# Patient Record
Sex: MALE | Race: OTHER | HISPANIC OR LATINO | Employment: FULL TIME | URBAN - METROPOLITAN AREA
[De-identification: names, ages, dates, MRNs, and addresses within clinical notes are randomized per-mention and may not be internally consistent; named-entity substitution may affect disease eponyms.]

---

## 2021-10-31 ENCOUNTER — HOSPITAL ENCOUNTER (EMERGENCY)
Facility: HOSPITAL | Age: 61
Discharge: HOME | End: 2021-10-31
Attending: EMERGENCY MEDICINE
Payer: COMMERCIAL

## 2021-10-31 ENCOUNTER — APPOINTMENT (EMERGENCY)
Dept: RADIOLOGY | Facility: HOSPITAL | Age: 61
End: 2021-10-31
Payer: COMMERCIAL

## 2021-10-31 VITALS
BODY MASS INDEX: 29.33 KG/M2 | DIASTOLIC BLOOD PRESSURE: 64 MMHG | HEIGHT: 69 IN | HEART RATE: 82 BPM | OXYGEN SATURATION: 98 % | SYSTOLIC BLOOD PRESSURE: 137 MMHG | TEMPERATURE: 99.3 F | WEIGHT: 198 LBS | RESPIRATION RATE: 16 BRPM

## 2021-10-31 DIAGNOSIS — M25.561 ACUTE PAIN OF RIGHT KNEE: Primary | ICD-10-CM

## 2021-10-31 PROCEDURE — 99284 EMERGENCY DEPT VISIT MOD MDM: CPT | Performed by: EMERGENCY MEDICINE

## 2021-10-31 PROCEDURE — 99284 EMERGENCY DEPT VISIT MOD MDM: CPT

## 2021-10-31 PROCEDURE — 73564 X-RAY EXAM KNEE 4 OR MORE: CPT

## 2021-10-31 RX ORDER — IBUPROFEN 600 MG/1
600 TABLET ORAL ONCE
Status: COMPLETED | OUTPATIENT
Start: 2021-10-31 | End: 2021-10-31

## 2021-10-31 RX ADMIN — IBUPROFEN 600 MG: 600 TABLET ORAL at 21:15

## 2021-11-01 ENCOUNTER — HOSPITAL ENCOUNTER (OUTPATIENT)
Dept: VASCULAR ULTRASOUND | Facility: HOSPITAL | Age: 61
Discharge: HOME | End: 2021-11-01
Payer: COMMERCIAL

## 2021-11-01 DIAGNOSIS — M25.561 ACUTE PAIN OF RIGHT KNEE: ICD-10-CM

## 2021-11-01 PROCEDURE — 93971 EXTREMITY STUDY: CPT

## 2021-11-01 PROCEDURE — 93971 EXTREMITY STUDY: CPT | Performed by: SURGERY

## 2021-11-04 VITALS
DIASTOLIC BLOOD PRESSURE: 84 MMHG | SYSTOLIC BLOOD PRESSURE: 170 MMHG | BODY MASS INDEX: 28.88 KG/M2 | HEART RATE: 78 BPM | WEIGHT: 195 LBS | HEIGHT: 69 IN

## 2021-11-04 DIAGNOSIS — M25.461 EFFUSION OF RIGHT KNEE: Primary | ICD-10-CM

## 2021-11-04 LAB
CRYSTALS SNV QL MICRO: NORMAL
HISTIOCYTES NFR SNV MANUAL: 2 %
LYMPHOCYTES # SNV MANUAL: 11 %
MONOCYTES NFR SNV MANUAL: 3 %
NEUTROPHILS NFR SNV MANUAL: 84 %
SITE: ABNORMAL
TOTAL CELLS COUNTED SPEC: 100
WBC # FLD MANUAL: ABNORMAL /UL (ref 0–200)

## 2021-11-04 PROCEDURE — 20610 DRAIN/INJ JOINT/BURSA W/O US: CPT | Performed by: ORTHOPAEDIC SURGERY

## 2021-11-04 PROCEDURE — 87205 SMEAR GRAM STAIN: CPT | Performed by: PHYSICIAN ASSISTANT

## 2021-11-04 PROCEDURE — 89060 EXAM SYNOVIAL FLUID CRYSTALS: CPT | Performed by: PHYSICIAN ASSISTANT

## 2021-11-04 PROCEDURE — 87070 CULTURE OTHR SPECIMN AEROBIC: CPT | Performed by: PHYSICIAN ASSISTANT

## 2021-11-04 PROCEDURE — 87476 LYME DIS DNA AMP PROBE: CPT | Performed by: PHYSICIAN ASSISTANT

## 2021-11-04 PROCEDURE — 36415 COLL VENOUS BLD VENIPUNCTURE: CPT | Performed by: ORTHOPAEDIC SURGERY

## 2021-11-04 PROCEDURE — 89051 BODY FLUID CELL COUNT: CPT | Performed by: PHYSICIAN ASSISTANT

## 2021-11-04 PROCEDURE — 99204 OFFICE O/P NEW MOD 45 MIN: CPT | Performed by: ORTHOPAEDIC SURGERY

## 2021-11-04 RX ORDER — ACETAMINOPHEN 500 MG
500 TABLET ORAL EVERY 6 HOURS PRN
COMMUNITY
End: 2021-11-17

## 2021-11-07 LAB
BACTERIA SPEC BFLD CULT: NO GROWTH
GRAM STN SPEC: NORMAL
GRAM STN SPEC: NORMAL

## 2021-11-13 LAB — B BURGDOR DNA SPEC QL NAA+PROBE: POSITIVE

## 2021-11-17 ENCOUNTER — APPOINTMENT (EMERGENCY)
Dept: RADIOLOGY | Facility: HOSPITAL | Age: 61
End: 2021-11-17
Payer: COMMERCIAL

## 2021-11-17 ENCOUNTER — HOSPITAL ENCOUNTER (EMERGENCY)
Facility: HOSPITAL | Age: 61
Discharge: HOME | End: 2021-11-17
Attending: INTERNAL MEDICINE | Admitting: INTERNAL MEDICINE
Payer: COMMERCIAL

## 2021-11-17 VITALS
WEIGHT: 195 LBS | HEIGHT: 69 IN | DIASTOLIC BLOOD PRESSURE: 76 MMHG | RESPIRATION RATE: 17 BRPM | BODY MASS INDEX: 28.88 KG/M2 | HEART RATE: 72 BPM | TEMPERATURE: 98 F | OXYGEN SATURATION: 100 % | SYSTOLIC BLOOD PRESSURE: 145 MMHG

## 2021-11-17 DIAGNOSIS — A69.23 LYME ARTHRITIS (HCC): Primary | ICD-10-CM

## 2021-11-17 PROCEDURE — 73562 X-RAY EXAM OF KNEE 3: CPT

## 2021-11-17 PROCEDURE — 99283 EMERGENCY DEPT VISIT LOW MDM: CPT

## 2021-11-17 PROCEDURE — 99284 EMERGENCY DEPT VISIT MOD MDM: CPT | Performed by: INTERNAL MEDICINE

## 2021-11-17 RX ORDER — DOXYCYCLINE HYCLATE 100 MG/1
100 CAPSULE ORAL EVERY 12 HOURS SCHEDULED
Qty: 56 CAPSULE | Refills: 0 | Status: SHIPPED | OUTPATIENT
Start: 2021-11-17 | End: 2021-11-17 | Stop reason: SDUPTHER

## 2021-11-17 RX ORDER — DOXYCYCLINE HYCLATE 100 MG/1
100 CAPSULE ORAL EVERY 12 HOURS SCHEDULED
Qty: 56 CAPSULE | Refills: 0 | Status: SHIPPED | OUTPATIENT
Start: 2021-11-17 | End: 2021-12-15

## 2021-11-17 RX ORDER — DOXYCYCLINE HYCLATE 100 MG/1
100 CAPSULE ORAL ONCE
Status: COMPLETED | OUTPATIENT
Start: 2021-11-17 | End: 2021-11-17

## 2021-11-17 RX ORDER — DOXYCYCLINE HYCLATE 100 MG/1
100 TABLET, DELAYED RELEASE ORAL 2 TIMES DAILY
Qty: 56 TABLET | Refills: 0 | Status: SHIPPED | OUTPATIENT
Start: 2021-11-17 | End: 2021-11-17

## 2021-11-17 RX ADMIN — DOXYCYCLINE 100 MG: 100 CAPSULE ORAL at 15:11

## 2021-12-05 ENCOUNTER — DOCUMENTATION (OUTPATIENT)
Dept: OTHER | Facility: HOSPITAL | Age: 61
End: 2021-12-05

## 2021-12-06 ENCOUNTER — CONSULT (OUTPATIENT)
Dept: INFECTIOUS DISEASES | Facility: CLINIC | Age: 61
End: 2021-12-06
Payer: COMMERCIAL

## 2021-12-06 VITALS
SYSTOLIC BLOOD PRESSURE: 142 MMHG | RESPIRATION RATE: 18 BRPM | TEMPERATURE: 98 F | HEART RATE: 68 BPM | DIASTOLIC BLOOD PRESSURE: 74 MMHG | WEIGHT: 195 LBS | BODY MASS INDEX: 28.88 KG/M2 | HEIGHT: 69 IN

## 2021-12-06 DIAGNOSIS — A69.23 LYME ARTHRITIS OF KNEE (HCC): Primary | ICD-10-CM

## 2021-12-06 PROCEDURE — 99204 OFFICE O/P NEW MOD 45 MIN: CPT | Performed by: INTERNAL MEDICINE

## 2021-12-06 RX ORDER — DOXYCYCLINE HYCLATE 100 MG/1
100 CAPSULE ORAL EVERY 12 HOURS SCHEDULED
Qty: 60 CAPSULE | Refills: 0 | Status: SHIPPED | OUTPATIENT
Start: 2021-12-06 | End: 2022-01-05

## 2021-12-09 ENCOUNTER — APPOINTMENT (OUTPATIENT)
Dept: LAB | Facility: HOSPITAL | Age: 61
End: 2021-12-09
Attending: INTERNAL MEDICINE
Payer: COMMERCIAL

## 2021-12-09 LAB
BASOPHILS # BLD AUTO: 0.03 THOUSANDS/ΜL (ref 0–0.1)
BASOPHILS NFR BLD AUTO: 1 % (ref 0–1)
CREAT SERPL-MCNC: 0.87 MG/DL (ref 0.5–1.2)
EOSINOPHIL # BLD AUTO: 0.18 THOUSAND/ΜL (ref 0–0.61)
EOSINOPHIL NFR BLD AUTO: 4 % (ref 0–6)
ERYTHROCYTE [DISTWIDTH] IN BLOOD BY AUTOMATED COUNT: 14.2 % (ref 11.6–15.1)
GFR SERPL CREATININE-BSD FRML MDRD: 94 ML/MIN/1.73SQ M
HCT VFR BLD AUTO: 43.1 % (ref 36.5–49.3)
HGB BLD-MCNC: 14.2 G/DL (ref 12–17)
IMM GRANULOCYTES # BLD AUTO: 0.01 THOUSAND/UL (ref 0–0.2)
IMM GRANULOCYTES NFR BLD AUTO: 0 % (ref 0–2)
LYMPHOCYTES # BLD AUTO: 1.76 THOUSANDS/ΜL (ref 0.6–4.47)
LYMPHOCYTES NFR BLD AUTO: 34 % (ref 14–44)
MCH RBC QN AUTO: 27.5 PG (ref 26.8–34.3)
MCHC RBC AUTO-ENTMCNC: 32.9 G/DL (ref 31.4–37.4)
MCV RBC AUTO: 84 FL (ref 82–98)
MONOCYTES # BLD AUTO: 0.44 THOUSAND/ΜL (ref 0.17–1.22)
MONOCYTES NFR BLD AUTO: 8 % (ref 4–12)
NEUTROPHILS # BLD AUTO: 2.79 THOUSANDS/ΜL (ref 1.85–7.62)
NEUTS SEG NFR BLD AUTO: 53 % (ref 43–75)
PLATELET # BLD AUTO: 212 THOUSANDS/UL (ref 149–390)
PMV BLD AUTO: 12.3 FL (ref 8.9–12.7)
RBC # BLD AUTO: 5.16 MILLION/UL (ref 3.88–5.62)
WBC # BLD AUTO: 5.21 THOUSAND/UL (ref 4.31–10.16)

## 2021-12-09 PROCEDURE — 82565 ASSAY OF CREATININE: CPT | Performed by: INTERNAL MEDICINE

## 2021-12-09 PROCEDURE — 36415 COLL VENOUS BLD VENIPUNCTURE: CPT | Performed by: INTERNAL MEDICINE

## 2021-12-09 PROCEDURE — 85025 COMPLETE CBC W/AUTO DIFF WBC: CPT | Performed by: INTERNAL MEDICINE

## 2021-12-23 ENCOUNTER — APPOINTMENT (OUTPATIENT)
Dept: LAB | Facility: HOSPITAL | Age: 61
End: 2021-12-23
Attending: INTERNAL MEDICINE
Payer: COMMERCIAL

## 2021-12-29 ENCOUNTER — OFFICE VISIT (OUTPATIENT)
Dept: INFECTIOUS DISEASES | Facility: CLINIC | Age: 61
End: 2021-12-29
Payer: COMMERCIAL

## 2021-12-29 VITALS
HEIGHT: 69 IN | DIASTOLIC BLOOD PRESSURE: 70 MMHG | SYSTOLIC BLOOD PRESSURE: 130 MMHG | BODY MASS INDEX: 28.88 KG/M2 | OXYGEN SATURATION: 98 % | HEART RATE: 74 BPM | WEIGHT: 195 LBS

## 2021-12-29 DIAGNOSIS — A69.23 LYME ARTHRITIS OF KNEE (HCC): Primary | ICD-10-CM

## 2021-12-29 PROCEDURE — 99214 OFFICE O/P EST MOD 30 MIN: CPT | Performed by: INTERNAL MEDICINE

## 2021-12-30 VITALS
WEIGHT: 191 LBS | BODY MASS INDEX: 28.29 KG/M2 | SYSTOLIC BLOOD PRESSURE: 136 MMHG | DIASTOLIC BLOOD PRESSURE: 88 MMHG | HEIGHT: 69 IN

## 2021-12-30 DIAGNOSIS — A69.23 LYME ARTHRITIS OF KNEE (HCC): Primary | ICD-10-CM

## 2021-12-30 PROCEDURE — 99213 OFFICE O/P EST LOW 20 MIN: CPT | Performed by: ORTHOPAEDIC SURGERY

## 2021-12-30 PROCEDURE — 3008F BODY MASS INDEX DOCD: CPT | Performed by: ORTHOPAEDIC SURGERY

## 2022-01-03 ENCOUNTER — NURSE TRIAGE (OUTPATIENT)
Dept: OTHER | Facility: OTHER | Age: 62
End: 2022-01-03

## 2022-01-03 DIAGNOSIS — Z20.828 SARS-ASSOCIATED CORONAVIRUS EXPOSURE: Primary | ICD-10-CM

## 2022-01-03 NOTE — TELEPHONE ENCOUNTER
Reason for Disposition   [1] COVID-19 infection suspected by caller or triager AND [2] mild symptoms (cough, fever, or others) AND [3] has not gotten tested yet    Answer Assessment - Initial Assessment Questions  1  COVID-19 DIAGNOSIS: "Who made your COVID-19 diagnosis?" "Was it confirmed by a positive lab test?" If not diagnosed by a HCP, ask "Are there lots of cases (community spread) where you live?" Note: See [facility] health department website, if unsure  n/a  2  COVID-19 EXPOSURE: "Was there any known exposure to COVID before the symptoms began?" CDC Definition of close contact: within 6 feet (2 meters) for a total of 15 minutes or more over a 24-hour period  Yes in home  3  ONSET: "When did the COVID-19 symptoms start?"       Yesterday  4  WORST SYMPTOM: "What is your worst symptom?" (e g , cough, fever, shortness of breath, muscle aches)      Cough  5  COUGH: "Do you have a cough?" If Yes, ask: "How bad is the cough?"        moderate  6  FEVER: "Do you have a fever?" If Yes, ask: "What is your temperature, how was it measured, and when did it start?"      no  7  RESPIRATORY STATUS: "Describe your breathing?" (e g , shortness of breath, wheezing, unable to speak)       normal  8  BETTER-SAME-WORSE: "Are you getting better, staying the same or getting worse compared to yesterday?"  If getting worse, ask, "In what way?"      Worse   9  HIGH RISK DISEASE: "Do you have any chronic medical problems?" (e g , asthma, heart or lung disease, weak immune system, obesity, etc )      No   10  VACCINE: "Have you gotten the COVID-19 vaccine?" If Yes ask: "Which one, how many shots, when did you get it?"        2 shots  11  PREGNANCY: "Is there any chance you are pregnant?" "When was your last menstrual period?"        no  12   OTHER SYMPTOMS: "Do you have any other symptoms?"  (e g , chills, fatigue, headache, loss of smell or taste, muscle pain, sore throat; new loss of smell or taste especially support the diagnosis of COVID-19)        No    Protocols used: CORONAVIRUS (COVID-19) DIAGNOSED OR SUSPECTED-ADULT-AH

## 2022-01-03 NOTE — TELEPHONE ENCOUNTER
Regarding: Symptomatic COVID cough  ----- Message from Amaury Kate sent at 1/3/2022  6:11 PM EST -----  "here in my house i have 1-2 people that had covid  i need to do the test to return to work   I only have a little cough"

## 2022-01-04 PROCEDURE — 87636 SARSCOV2 & INF A&B AMP PRB: CPT | Performed by: FAMILY MEDICINE

## 2022-02-07 ENCOUNTER — OFFICE VISIT (OUTPATIENT)
Dept: FAMILY MEDICINE CLINIC | Facility: CLINIC | Age: 62
End: 2022-02-07
Payer: COMMERCIAL

## 2022-02-07 VITALS
BODY MASS INDEX: 29.33 KG/M2 | RESPIRATION RATE: 16 BRPM | DIASTOLIC BLOOD PRESSURE: 80 MMHG | OXYGEN SATURATION: 99 % | WEIGHT: 198 LBS | HEART RATE: 80 BPM | HEIGHT: 69 IN | SYSTOLIC BLOOD PRESSURE: 142 MMHG

## 2022-02-07 DIAGNOSIS — E55.9 VITAMIN D DEFICIENCY: ICD-10-CM

## 2022-02-07 DIAGNOSIS — M25.461 EFFUSION OF RIGHT KNEE: ICD-10-CM

## 2022-02-07 DIAGNOSIS — Z11.59 ENCOUNTER FOR HEPATITIS C SCREENING TEST FOR LOW RISK PATIENT: ICD-10-CM

## 2022-02-07 DIAGNOSIS — E53.8 B12 DEFICIENCY: ICD-10-CM

## 2022-02-07 DIAGNOSIS — Z00.00 WELL ADULT EXAM: ICD-10-CM

## 2022-02-07 DIAGNOSIS — Z12.5 SCREENING FOR PROSTATE CANCER: ICD-10-CM

## 2022-02-07 DIAGNOSIS — Z12.11 SCREENING FOR MALIGNANT NEOPLASM OF COLON: ICD-10-CM

## 2022-02-07 DIAGNOSIS — Z11.4 SCREENING FOR HIV (HUMAN IMMUNODEFICIENCY VIRUS): ICD-10-CM

## 2022-02-07 DIAGNOSIS — Z82.0 FAMILY HISTORY OF ALZHEIMER'S DISEASE: ICD-10-CM

## 2022-02-07 DIAGNOSIS — M25.561 ACUTE PAIN OF RIGHT KNEE: Primary | ICD-10-CM

## 2022-02-07 PROCEDURE — 99203 OFFICE O/P NEW LOW 30 MIN: CPT | Performed by: FAMILY MEDICINE

## 2022-02-07 PROCEDURE — 3725F SCREEN DEPRESSION PERFORMED: CPT | Performed by: FAMILY MEDICINE

## 2022-02-07 PROCEDURE — 1036F TOBACCO NON-USER: CPT | Performed by: FAMILY MEDICINE

## 2022-02-07 PROCEDURE — 3008F BODY MASS INDEX DOCD: CPT | Performed by: FAMILY MEDICINE

## 2022-02-07 PROCEDURE — 99386 PREV VISIT NEW AGE 40-64: CPT | Performed by: FAMILY MEDICINE

## 2022-02-07 NOTE — PROGRESS NOTES
Assessment/Plan:    After the lyme arthrtis tx   He was not in pain anymore but he found his knee is weak and jac   We can send him for BW   Colon CA and Prostate CA screening also   For the fhx of alzheimer - try luminosity waylon and ex and breath daily     1  Acute pain of right knee  -     Ambulatory Referral to Physical Therapy; Future    2  Well adult exam  -     CBC and differential; Future  -     Comprehensive metabolic panel; Future  -     Lipid Panel with Direct LDL reflex; Future    3  Screening for prostate cancer  -     PSA, Total Screen; Future    4  Screening for malignant neoplasm of colon  -     Cologuard    5  Encounter for hepatitis C screening test for low risk patient  -     Hepatitis C antibody; Future    6  Screening for HIV (human immunodeficiency virus)  -     HIV 1/2 Antigen/Antibody (4th Generation) w Reflex [facility]; Future    7  B12 deficiency  -     Vitamin B12; Future    8  Vitamin D deficiency  -     Vitamin D 25 hydroxy; Future    9  BMI 29 0-29 9,adult    10  Family history of Alzheimer's disease         Subjective:      Patient ID: Stacie Sheth is a 62 y o  male  HPI  Here with wife for follow up on knee pain   And to establish care  fhx of Alzheimer     The following portions of the patient's history were reviewed and updated as appropriate: allergies, current medications, past family history, past medical history, past social history, past surgical history and problem list     Review of Systems   Constitutional: Negative for activity change, appetite change and fever  HENT: Negative for congestion, nosebleeds and trouble swallowing  Eyes: Negative for itching  Respiratory: Negative for cough and chest tightness  Cardiovascular: Negative for chest pain and palpitations  Gastrointestinal: Negative for abdominal pain, constipation, diarrhea and nausea  Endocrine: Negative for cold intolerance  Genitourinary: Negative for frequency     Musculoskeletal: Positive for arthralgias and gait problem  Negative for joint swelling  Skin: Negative for rash  Allergic/Immunologic: Negative for immunocompromised state  Neurological: Negative for dizziness, tremors, seizures, syncope and headaches  Psychiatric/Behavioral: Negative for hallucinations and suicidal ideas  Objective:      /80 (BP Location: Right arm, Patient Position: Sitting, Cuff Size: Standard)   Pulse 80   Resp 16   Ht 5' 9" (1 753 m)   Wt 89 8 kg (198 lb)   SpO2 99%   BMI 29 24 kg/m²     No visits with results within 2 Week(s) from this visit  Latest known visit with results is:   Orders Only on 01/04/2022   Component Date Value    SARS-CoV-2 01/04/2022 Positive*    INFLUENZA A PCR 01/04/2022 Negative     INFLUENZA B PCR 01/04/2022 Negative           Physical Exam  Vitals and nursing note reviewed  Constitutional:       General: He is not in acute distress  Appearance: He is well-developed  HENT:      Head: Normocephalic and atraumatic  Cardiovascular:      Rate and Rhythm: Normal rate and regular rhythm  Heart sounds: Normal heart sounds  No murmur heard  Pulmonary:      Effort: Pulmonary effort is normal       Breath sounds: Normal breath sounds  No wheezing or rales  Abdominal:      General: Bowel sounds are normal  There is no distension  Palpations: Abdomen is soft  Tenderness: There is no abdominal tenderness  There is no guarding or rebound  Musculoskeletal:         General: No tenderness  Normal range of motion  Cervical back: Normal range of motion and neck supple  Lymphadenopathy:      Cervical: No cervical adenopathy  Skin:     General: Skin is warm and dry  Capillary Refill: Capillary refill takes less than 2 seconds  Findings: No rash  Neurological:      Mental Status: He is alert and oriented to person, place, and time  Cranial Nerves: No cranial nerve deficit  Sensory: No sensory deficit        Motor: No abnormal muscle tone  Psychiatric:         Behavior: Behavior normal          Thought Content: Thought content normal          Judgment: Judgment normal          BMI Counseling: Body mass index is 29 24 kg/m²  The BMI is above normal  Nutrition recommendations include decreasing portion sizes, encouraging healthy choices of fruits and vegetables and limiting drinks that contain sugar  Exercise recommendations include moderate physical activity 150 minutes/week  No pharmacotherapy was ordered  Rationale for BMI follow-up plan is due to patient being overweight or obese  Depression Screening and Follow-up Plan: Patient was screened for depression during today's encounter   They screened negative with a PHQ-2 score of 0       Corinne Snow MD  [place] [address]

## 2022-02-15 ENCOUNTER — EVALUATION (OUTPATIENT)
Dept: PHYSICAL THERAPY | Facility: CLINIC | Age: 62
End: 2022-02-15
Payer: COMMERCIAL

## 2022-02-15 DIAGNOSIS — M25.561 ACUTE PAIN OF RIGHT KNEE: ICD-10-CM

## 2022-02-15 PROCEDURE — 97161 PT EVAL LOW COMPLEX 20 MIN: CPT | Performed by: PHYSICAL THERAPIST

## 2022-02-15 NOTE — PROGRESS NOTES
PT Evaluation     Today's date: 2/15/2022  Patient name: Vadnana Torres  : 1960  MRN: [id number]  Referring provider: Armnado Van MD  Dx:   Encounter Diagnosis     ICD-10-CM    1  Acute pain of right knee  M25 561 Ambulatory Referral to Physical Therapy                  Assessment  Assessment details: Vandana Torres is a 62 y o  male who presents to physical therapy with diagnosis of right knee pain   Ramon presents with pain, abnormal gait, and limitations in range of motion, strength, joint mobility, flexibility, and functional ability  The current limitations are affecting Ramon's ability to function at prior level  He will benefit from skilled physical therapy to address the current impairments and functional limitations to enable him to return to daily activities at maximal level   Thank you for the referral     Impairments: abnormal gait, abnormal or restricted ROM, activity intolerance, impaired balance, impaired physical strength, lacks appropriate home exercise program, weight-bearing intolerance and poor posture     Symptom irritability: lowUnderstanding of Dx/Px/POC: good   Prognosis: good    Goals  STG  Patient will decrease pain at worst to 3/10  Patient will improve SLB to 5 seconds bilaterally  Patient will be independent with basic HEP    LTG  Patient will decrease pain at worst to 0-1/10  Patient will improve LE strength 1/2 grade in all deficit planes  Patient will ascend/descend steps with normal pattern, pain free  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  Patient will be independent with comprehensive HEP    Plan  Patient would benefit from: skilled physical therapy  Planned modality interventions: cryotherapy and thermotherapy: hydrocollator packs  Planned therapy interventions: manual therapy, neuromuscular re-education, patient education, therapeutic activities, therapeutic exercise, therapeutic training and home exercise program  Frequency: 2x week  Duration in weeks: 6  Treatment plan discussed with: patient        Subjective Evaluation    History of Present Illness  Mechanism of injury:   Ramonita Weber is a 62 y o  male presenting to physical therapy on 02/15/22 with primary complaints of right knee pain  He mentions it started about 6 months ago  He mentions he was working one day and had some discomfort and swelling in his knee  He went to the doctor and they drained his knee and they diagnosed him with lyme disease  He mentions since then he has been having some trouble with the knee giving out and feeling weak  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  He states it feels like it is going to give out at least 3-4x per day  He has difficulty ascending / descending the stairs, nonreciprocal pattern    Pain  Current pain ratin  At best pain ratin  At worst pain ratin  Location: R Knee  Quality: dull ache  Relieving factors: rest  Aggravating factors: walking    Social Support  Steps to enter house: yes  Stairs in house: yes     Employment status: working  Treatments  No previous or current treatments  Patient Goals  Patient goals for therapy: decreased pain, independence with ADLs/IADLs, increased strength and return to sport/leisure activities  Patient goal: decrease pain with walking, go up/down steps better        Objective    Gait: decreased stance phase on RLE, decreased knee extension throughout gait on R  Palpation: no tenderness noted upon examination      Knee AROM:  Right: WFL  Left: WFL    LE Strength (L,R)  Hip Flexion: 5/5 , 4+/5  Hip Extension: 5/5 , 4+/5  Hip Abduction: 4+/5 , 4/5  Hip IR (90/90): 5/5 , 5/5  Hip ER (90/90): 5/5 , 4/5  Knee Extension: 5/5 , 3+/5  Knee Flexion: 4+/5 , 4/5  Ankle DF: 5/5 , 5/5    Quad Set: good, fair   SLR R: able to compete, but reports it is challenging     Flexibility: significant tightness R hamstring and hip flexor    5x sit to stand: 25 69 seconds, discomfort in R knee, "weakness" noted      Balance:  SLB R: 3 sec  SLB L: 3 sec            Diagnosis: right knee pain   Precautions: hx of lyme disease   Goals: improve LE strength, decrease pain with ambulation   Manual Therapy 2/15/22                                               Exercise Diary         Therapeutic Exercise        bike        Hamstring stretch supine with strap        Prone HF Stretch with strap                                        Neuromuscular Re-education        QS        QS + SLR         Bridges        Clamshells        Side Stepping        HR/TR        SLB                Therapeutic Activities        Pt Education

## 2022-02-19 ENCOUNTER — APPOINTMENT (OUTPATIENT)
Dept: LAB | Facility: HOSPITAL | Age: 62
End: 2022-02-19
Payer: COMMERCIAL

## 2022-02-19 DIAGNOSIS — Z11.4 SCREENING FOR HIV (HUMAN IMMUNODEFICIENCY VIRUS): ICD-10-CM

## 2022-02-19 DIAGNOSIS — E53.8 B12 DEFICIENCY: ICD-10-CM

## 2022-02-19 DIAGNOSIS — Z00.00 WELL ADULT EXAM: ICD-10-CM

## 2022-02-19 DIAGNOSIS — E55.9 VITAMIN D DEFICIENCY: ICD-10-CM

## 2022-02-19 DIAGNOSIS — Z11.59 ENCOUNTER FOR HEPATITIS C SCREENING TEST FOR LOW RISK PATIENT: ICD-10-CM

## 2022-02-19 DIAGNOSIS — Z12.5 SCREENING FOR PROSTATE CANCER: ICD-10-CM

## 2022-02-19 LAB
25(OH)D3 SERPL-MCNC: 9.9 NG/ML (ref 30–100)
ALBUMIN SERPL BCP-MCNC: 4.3 G/DL (ref 3.4–4.8)
ALP SERPL-CCNC: 78 U/L (ref 10–129)
ALT SERPL W P-5'-P-CCNC: 18 U/L (ref 5–63)
ANION GAP SERPL CALCULATED.3IONS-SCNC: 7 MMOL/L (ref 4–13)
AST SERPL W P-5'-P-CCNC: 13 U/L (ref 15–41)
BASOPHILS # BLD AUTO: 0.03 THOUSANDS/ΜL (ref 0–0.1)
BASOPHILS NFR BLD AUTO: 0 % (ref 0–1)
BILIRUB SERPL-MCNC: 0.98 MG/DL (ref 0.3–1.2)
BUN SERPL-MCNC: 19 MG/DL (ref 6–20)
CALCIUM SERPL-MCNC: 9.2 MG/DL (ref 8.4–10.2)
CHLORIDE SERPL-SCNC: 101 MMOL/L (ref 96–108)
CHOLEST SERPL-MCNC: 218 MG/DL
CO2 SERPL-SCNC: 29 MMOL/L (ref 22–33)
CREAT SERPL-MCNC: 0.74 MG/DL (ref 0.5–1.2)
EOSINOPHIL # BLD AUTO: 0.16 THOUSAND/ΜL (ref 0–0.61)
EOSINOPHIL NFR BLD AUTO: 2 % (ref 0–6)
ERYTHROCYTE [DISTWIDTH] IN BLOOD BY AUTOMATED COUNT: 14.1 % (ref 11.6–15.1)
GFR SERPL CREATININE-BSD FRML MDRD: 100 ML/MIN/1.73SQ M
GLUCOSE P FAST SERPL-MCNC: 105 MG/DL (ref 70–105)
HCT VFR BLD AUTO: 44 % (ref 36.5–49.3)
HCV AB SER QL: NORMAL
HDLC SERPL-MCNC: 40 MG/DL
HGB BLD-MCNC: 14.6 G/DL (ref 12–17)
IMM GRANULOCYTES # BLD AUTO: 0.02 THOUSAND/UL (ref 0–0.2)
IMM GRANULOCYTES NFR BLD AUTO: 0 % (ref 0–2)
LDLC SERPL CALC-MCNC: 150 MG/DL (ref 0–100)
LYMPHOCYTES # BLD AUTO: 1.94 THOUSANDS/ΜL (ref 0.6–4.47)
LYMPHOCYTES NFR BLD AUTO: 26 % (ref 14–44)
MCH RBC QN AUTO: 28 PG (ref 26.8–34.3)
MCHC RBC AUTO-ENTMCNC: 33.2 G/DL (ref 31.4–37.4)
MCV RBC AUTO: 84 FL (ref 82–98)
MONOCYTES # BLD AUTO: 0.58 THOUSAND/ΜL (ref 0.17–1.22)
MONOCYTES NFR BLD AUTO: 8 % (ref 4–12)
NEUTROPHILS # BLD AUTO: 4.69 THOUSANDS/ΜL (ref 1.85–7.62)
NEUTS SEG NFR BLD AUTO: 64 % (ref 43–75)
NRBC BLD AUTO-RTO: 0 /100 WBCS
PLATELET # BLD AUTO: 216 THOUSANDS/UL (ref 149–390)
PMV BLD AUTO: 12.2 FL (ref 8.9–12.7)
POTASSIUM SERPL-SCNC: 4 MMOL/L (ref 3.5–5)
PROT SERPL-MCNC: 8.1 G/DL (ref 6.4–8.3)
PSA SERPL-MCNC: 0.6 NG/ML (ref 0–4)
RBC # BLD AUTO: 5.22 MILLION/UL (ref 3.88–5.62)
SODIUM SERPL-SCNC: 137 MMOL/L (ref 133–145)
TRIGL SERPL-MCNC: 138 MG/DL
VIT B12 SERPL-MCNC: 191 PG/ML (ref 100–900)
WBC # BLD AUTO: 7.42 THOUSAND/UL (ref 4.31–10.16)

## 2022-02-19 PROCEDURE — 82306 VITAMIN D 25 HYDROXY: CPT

## 2022-02-19 PROCEDURE — 80053 COMPREHEN METABOLIC PANEL: CPT

## 2022-02-19 PROCEDURE — 80061 LIPID PANEL: CPT

## 2022-02-19 PROCEDURE — 85025 COMPLETE CBC W/AUTO DIFF WBC: CPT

## 2022-02-19 PROCEDURE — 87389 HIV-1 AG W/HIV-1&-2 AB AG IA: CPT

## 2022-02-19 PROCEDURE — G0103 PSA SCREENING: HCPCS

## 2022-02-19 PROCEDURE — 86803 HEPATITIS C AB TEST: CPT

## 2022-02-19 PROCEDURE — 82607 VITAMIN B-12: CPT

## 2022-02-19 PROCEDURE — 36415 COLL VENOUS BLD VENIPUNCTURE: CPT

## 2022-02-21 ENCOUNTER — OFFICE VISIT (OUTPATIENT)
Dept: PHYSICAL THERAPY | Facility: CLINIC | Age: 62
End: 2022-02-21
Payer: COMMERCIAL

## 2022-02-21 DIAGNOSIS — M25.561 ACUTE PAIN OF RIGHT KNEE: Primary | ICD-10-CM

## 2022-02-21 LAB — HIV 1+2 AB+HIV1 P24 AG SERPL QL IA: NORMAL

## 2022-02-21 PROCEDURE — 97112 NEUROMUSCULAR REEDUCATION: CPT

## 2022-02-21 PROCEDURE — 97110 THERAPEUTIC EXERCISES: CPT

## 2022-02-21 NOTE — PROGRESS NOTES
Daily Note     Today's date: 2022  Patient name: Javier Torres  : 1960  MRN: [id number]  Referring provider: Jeane Cruz MD  Dx:   Encounter Diagnosis     ICD-10-CM    1  Acute pain of right knee  M25 561                   Subjective: Patient denies pain at start of session  Objective: See treatment diary below      Assessment: Tolerated treatment well  Exhibited significant HS tightness, consider manuals at NV  Some discomfort lateral and superior to patella with close packed position  Required VC and demonstration for proper technique with clamshells as patient had tendency to compensate with trunk  Patient demonstrated fatigue post treatment and would benefit from continued PT      Plan: Continue per plan of care  Diagnosis: right knee pain   Precautions: hx of lyme disease   Goals: improve LE strength, decrease pain with ambulation   Manual Therapy 2/15/22 2/21/22      HSS  NV?                                       Exercise Diary         Therapeutic Exercise        bike  5'      Hamstring stretch supine with strap  3x30"      Prone HF Stretch with strap  3x30"                                      Neuromuscular Re-education        QS  5"x20 R      QS + SLR   2x10 R      Bridges  2x10       Clamshells  10"x10 B      Side Stepping  8x at table       HR/TR  20x       SLB  15" 4x R               Therapeutic Activities        Pt Education

## 2022-02-24 ENCOUNTER — OFFICE VISIT (OUTPATIENT)
Dept: PHYSICAL THERAPY | Facility: CLINIC | Age: 62
End: 2022-02-24
Payer: COMMERCIAL

## 2022-02-24 DIAGNOSIS — M25.561 ACUTE PAIN OF RIGHT KNEE: Primary | ICD-10-CM

## 2022-02-24 PROCEDURE — 97110 THERAPEUTIC EXERCISES: CPT | Performed by: PHYSICAL THERAPIST

## 2022-02-24 PROCEDURE — 97112 NEUROMUSCULAR REEDUCATION: CPT | Performed by: PHYSICAL THERAPIST
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
## 2022-03-08 ENCOUNTER — OFFICE VISIT (OUTPATIENT)
Dept: PHYSICAL THERAPY | Facility: CLINIC | Age: 62
End: 2022-03-08
Payer: COMMERCIAL

## 2022-03-08 DIAGNOSIS — M25.561 ACUTE PAIN OF RIGHT KNEE: Primary | ICD-10-CM

## 2022-03-08 PROCEDURE — 97112 NEUROMUSCULAR REEDUCATION: CPT

## 2022-03-08 PROCEDURE — 97140 MANUAL THERAPY 1/> REGIONS: CPT

## 2022-03-08 PROCEDURE — 97530 THERAPEUTIC ACTIVITIES: CPT

## 2022-03-08 PROCEDURE — 97110 THERAPEUTIC EXERCISES: CPT

## 2022-03-08 NOTE — PROGRESS NOTES
Daily Note     Today's date: 3/8/2022  Patient name: Pamela Ayala  : 1960  MRN: [id number]  Referring provider: Stephanie Hou MD  Dx:   Encounter Diagnosis     ICD-10-CM    1  Acute pain of right knee  M25 561                   Subjective: Pt reports that his knee is about the same, he gets random pains out of the blue  Objective: See treatment diary below      Assessment: Tolerated treatment well  Progressed with strengthening ex's as tolerated with pt having no reports of increased pain sx's  Pt challenged with strengthening ex's, especially hips strengthening and muscle fatigue is noted  Cues for form needed  Pt did have minimal discomfort with QS at 100%, but was able to perform at 75% with no pain  Discussed HEP with pt reporting understanding  Pt will benefit from continued therapy        Plan: Continue per plan of care     Diagnosis: right knee pain   Precautions: hx of lyme disease   Goals: improve LE strength, decrease pain with ambulation   Manual Therapy 2/15/22 2/21/22 2/24/22 3/8/22    HSS  NV? SK, PT R only     STM to medial knee    TJH, PTA                            Exercise Diary         Therapeutic Exercise        bike  5'  5 min     Hamstring stretch supine with strap  3x30" 3x30" Seated at EOT  3x30 sec    Prone HF Stretch with strap  3x30" 3x30"                                     Neuromuscular Re-education        QS  5"x20 R 5" x20 10x10 sec at 75%    QS + SLR   2x10 R 2x10 2x10 ea    Bridges  2x10  2x10 20x    Clamshells  10"x10 B 5"x10 ea 15x    Side Stepping  8x at table  RTB  GTB: 2x at mirror    HR/TR  20x  30x     TKE    GTB: 15x    Tandem balance on foam    3x30 sec ea    Mini squats    NV    Step ups    6" 15x on R    SLB  15" 4x R  20" x3 R 3x30 sec on R    Wobble board    2 mins ea    Therapeutic Activities        Pt Education    HEP discussed

## 2022-03-10 ENCOUNTER — OFFICE VISIT (OUTPATIENT)
Dept: PHYSICAL THERAPY | Facility: CLINIC | Age: 62
End: 2022-03-10
Payer: COMMERCIAL

## 2022-03-10 DIAGNOSIS — M25.561 ACUTE PAIN OF RIGHT KNEE: Primary | ICD-10-CM

## 2022-03-10 PROCEDURE — 97110 THERAPEUTIC EXERCISES: CPT

## 2022-03-10 PROCEDURE — 97140 MANUAL THERAPY 1/> REGIONS: CPT

## 2022-03-10 PROCEDURE — 97530 THERAPEUTIC ACTIVITIES: CPT

## 2022-03-10 PROCEDURE — 97112 NEUROMUSCULAR REEDUCATION: CPT

## 2022-03-10 NOTE — PROGRESS NOTES
Daily Note     Today's date: 3/10/2022  Patient name: Vilma Rocha  : 1960  MRN: [id number]  Referring provider: Grace Cartagena MD  Dx:   Encounter Diagnosis     ICD-10-CM    1  Acute pain of right knee  M25 561                   Subjective: Pt states that the back of his leg was sore after his last visit, but he thinks that his knee felt better  He doesn't remember that it gave out at all  Objective: See treatment diary below      Assessment: Tolerated treatment well  Continued with outlined program and progressed with strengthening as able with no c/o increased pain sx's  Pt challenged and muscle fatigue is noted  Cues needed for proper form of squat to avoid knee pain  Decreased pain noted after manual therapy  Pt will benefit from continued therapy         Plan: Continue per plan of care     Diagnosis: right knee pain   Precautions: hx of lyme disease   Goals: improve LE strength, decrease pain with ambulation   Manual Therapy  2/21/22 2/24/22 3/8/22 3/10/22   HSS  NV? SK, PT R only     STM to medial knee    TJH, PTA TJH, PTA   + patellar tendon                           Exercise Diary         Therapeutic Exercise        bike  5'  5 min  5 mins   Hamstring stretch supine with strap  3x30" 3x30" Seated at EOT  3x30 sec Seated at EOT  3x30 sec   Prone HF Stretch with strap  3x30" 3x30"                                     Neuromuscular Re-education        QS  5"x20 R 5" x20 10x10 sec at 75%    QS + SLR   2x10 R 2x10 2x10 ea 2x10 ea   Bridges  2x10  2x10 20x 20x   Clamshells  10"x10 B 5"x10 ea 15x 20x   Side Stepping  8x at table  RTB  GTB: 2x at mirror    HR/TR  20x  30x     TKE    GTB: 15x    Tandem balance on foam    3x30 sec ea    Mini squats    NV 15x   Step ups    6" 15x on R 6" 15x on R   SLB  15" 4x R  20" x3 R 3x30 sec on R On foam: 3x30 sec   Wobble board    2 mins ea 2 mins ea   Therapeutic Activities        Pt Education    HEP discussed

## 2022-03-15 ENCOUNTER — OFFICE VISIT (OUTPATIENT)
Dept: PHYSICAL THERAPY | Facility: CLINIC | Age: 62
End: 2022-03-15
Payer: COMMERCIAL

## 2022-03-15 DIAGNOSIS — M25.561 ACUTE PAIN OF RIGHT KNEE: Primary | ICD-10-CM

## 2022-03-15 PROCEDURE — 97110 THERAPEUTIC EXERCISES: CPT

## 2022-03-15 PROCEDURE — 97530 THERAPEUTIC ACTIVITIES: CPT

## 2022-03-15 PROCEDURE — 97112 NEUROMUSCULAR REEDUCATION: CPT

## 2022-03-15 PROCEDURE — 97140 MANUAL THERAPY 1/> REGIONS: CPT

## 2022-03-15 NOTE — PROGRESS NOTES
Daily Note     Today's date: 3/15/2022  Patient name: Jaida Martinez  : 1960  MRN: [id number]  Referring provider: Елена Galicia MD  Dx:   Encounter Diagnosis     ICD-10-CM    1  Acute pain of right knee  M25 561                   Subjective: Pt states that he continues to have pain in his knee, and it is more in the front at times  He reports that it is better than it was  Objective: See treatment diary below      Assessment: Tolerated treatment well  Continued with outlined program and progressed with strengthening as able  Pt was unable to perform step up and over due to increased knee pain  No complaints with step ups  Tenderness noted in medial knee/adductor with decreased pain and tightness noted after  Pt will benefit from continued therapy  Plan: Continue per plan of care     Diagnosis: right knee pain   Precautions: hx of lyme disease   Goals: improve LE strength, decrease pain with ambulation   Manual Therapy 3/15/22  2/24/22 3/8/22 3/10/22   HSS   SK, PT R only     STM to medial knee TJH, PTA   TJH, PTA TJH, PTA   + patellar tendon                           Exercise Diary         Therapeutic Exercise        bike 5 mins   5 min  5 mins   Hamstring stretch supine with strap NV  3x30" Seated at EOT  3x30 sec Seated at EOT  3x30 sec   Prone HF Stretch with strap   3x30"     Adductor stretch NV?                                Neuromuscular Re-education        QS   5" x20 10x10 sec at 75%    QS + SLR  2x10 ea  2x10 2x10 ea 2x10 ea   Bridges 20x  2x10 20x 20x   Clamshells   5"x10 ea 15x 20x   Side Stepping   RTB  GTB: 2x at mirror    HR/TR   30x     TKE    GTB: 15x    Tandem balance on foam 2x30 sec ea   3x30 sec ea    Mini squats    NV 15x   Step ups 6" 15x on R   6" 15x on R 6" 15x on R   SLB on foam 3x30 sec  20" x3 R 3x30 sec on R On foam: 3x30 sec   Wobble board 2 mins ea   2 mins ea 2 mins ea   Therapeutic Activities        Pt Education HEP discussed   HEP discussed

## 2022-03-17 ENCOUNTER — OFFICE VISIT (OUTPATIENT)
Dept: PHYSICAL THERAPY | Facility: CLINIC | Age: 62
End: 2022-03-17
Payer: COMMERCIAL

## 2022-03-17 DIAGNOSIS — M25.561 ACUTE PAIN OF RIGHT KNEE: Primary | ICD-10-CM

## 2022-03-17 PROCEDURE — 97110 THERAPEUTIC EXERCISES: CPT

## 2022-03-17 PROCEDURE — 97112 NEUROMUSCULAR REEDUCATION: CPT

## 2022-03-17 PROCEDURE — 97530 THERAPEUTIC ACTIVITIES: CPT

## 2022-03-17 PROCEDURE — 97140 MANUAL THERAPY 1/> REGIONS: CPT

## 2022-03-17 NOTE — PROGRESS NOTES
Daily Note     Today's date: 3/17/2022  Patient name: Stacie Sheth  : 1960  MRN: [id number]  Referring provider: Colten Rao MD  Dx:   Encounter Diagnosis     ICD-10-CM    1  Acute pain of right knee  M25 561                   Subjective: Pt states that his knee is feeling better today  He states that it was a little sore yesterday still  Objective: See treatment diary below      Assessment: Tolerated treatment well  Less tenderness is noted during manual therapy, although he did continue to have some in his distal adductor  Continued with hip and knee strengthening, with pt having no c/o increased sx's  Pt challenged and muscle fatigue is noted  Discussed pt's HEP with pt reporting understanding  Pt will benefit from continued therapy to improve his strength and decrease pain        Plan: Continue per plan of care     Diagnosis: right knee pain   Precautions: hx of lyme disease   Goals: improve LE strength, decrease pain with ambulation   Manual Therapy 3/15/22 3/17/22  3/8/22 3/10/22   HSS        STM to medial knee TJH, PTA TJH, PTA  TJH, PTA TJH, PTA   + patellar tendon                           Exercise Diary         Therapeutic Exercise        bike 5 mins 5 mins   5 mins   Hamstring stretch supine with strap NV   Seated at EOT  3x30 sec Seated at EOT  3x30 sec   Prone HF Stretch with strap        Adductor stretch NV? 3x30 sec                               Neuromuscular Re-education        QS    10x10 sec at 75%    QS + SLR  2x10 ea 2x10 ea  2x10 ea 2x10 ea   Bridges 20x   20x 20x   Clamshells  20x  15x 20x   Side Stepping    GTB: 2x at mirror    HR/TR        TKE    GTB: 15x    Tandem balance on foam 2x30 sec ea   3x30 sec ea    Mini squats    NV 15x   Step ups 6" 15x on R 8" 10x on R  6" 15x on R 6" 15x on R   SLB on foam 3x30 sec 3x30 sec on R  3x30 sec on R On foam: 3x30 sec   Wobble board 2 mins ea 2 mins ea  2 mins ea 2 mins ea   Therapeutic Activities        Pt Education HEP discussed   HEP discussed

## 2022-03-22 ENCOUNTER — EVALUATION (OUTPATIENT)
Dept: PHYSICAL THERAPY | Facility: CLINIC | Age: 62
End: 2022-03-22
Payer: COMMERCIAL

## 2022-03-22 DIAGNOSIS — M25.561 ACUTE PAIN OF RIGHT KNEE: Primary | ICD-10-CM

## 2022-03-22 PROCEDURE — 97112 NEUROMUSCULAR REEDUCATION: CPT | Performed by: PHYSICAL THERAPIST

## 2022-03-22 PROCEDURE — 97110 THERAPEUTIC EXERCISES: CPT | Performed by: PHYSICAL THERAPIST

## 2022-03-22 PROCEDURE — 97530 THERAPEUTIC ACTIVITIES: CPT | Performed by: PHYSICAL THERAPIST

## 2022-03-22 PROCEDURE — 97140 MANUAL THERAPY 1/> REGIONS: CPT | Performed by: PHYSICAL THERAPIST

## 2022-03-22 NOTE — PROGRESS NOTES
PT Re-Evaluation     Today's date: 3/22/22  Patient name: Martin Merrill  : 1960  MRN: [id number]  Referring provider: Mathew Luu MD  Dx:   Encounter Diagnosis     ICD-10-CM    1  Acute pain of right knee  M25 561 Ambulatory Referral to Physical Therapy             Assessment  Assessment details: Martin eMrrill is a 62 y o  male who has attended 8 physical therapy sessions for diagnosis of right knee pain  Since initial evaluation he has demonstrated improvements in his pain levels,  range of motion, strength, flexibility, and functional ability  However, he continues to have limitations as noted below  He would benefit from continued physical therapy to enable him to continue to progress toward patient and therapist established goals       Impairments: abnormal gait, abnormal or restricted ROM, activity intolerance, impaired balance, impaired physical strength, lacks appropriate home exercise program, weight-bearing intolerance and poor posture     Symptom irritability: lowUnderstanding of Dx/Px/POC: good   Prognosis: good    Goals  STG  Patient will decrease pain at worst to 3/10 - PROGRESSING   Patient will improve SLB to 5 seconds bilaterally - MET  Patient will be independent with basic HEP - MET    LTG  Patient will decrease pain at worst to 0-1/10 - IN PROGRESS  Patient will improve LE strength 1/2 grade in all deficit planes - MET  Patient will ascend/descend steps with normal pattern, pain free - MET  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 - MET  Patient will be independent with comprehensive HEP - [address]  Patient would benefit from: skilled physical therapy  Planned modality interventions: cryotherapy and thermotherapy: hydrocollator packs  Planned therapy interventions: manual therapy, neuromuscular re-education, patient education, therapeutic activities, therapeutic exercise, therapeutic training and home exercise program  Frequency: 2x week  Duration in weeks: 6  Treatment plan discussed with: patient        Subjective Evaluation    History of Present Illness  Mechanism of injury:   Lonny Best is a 62 y o  male presenting to physical therapy on 02/15/22 with primary complaints of right knee pain  He mentions it started about 6 months ago  He mentions he was working one day and had some discomfort and swelling in his knee  He went to the doctor and they drained his knee and they diagnosed him with lyme disease  He mentions since then he has been having some trouble with the knee giving out and feeling weak  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 do everyday all day  He states it feels like it is going to give out at least 3-4x per day  He has difficulty ascending / descending the stairs, nonreciprocal pattern    3/22/22 Re Evaluation: 70% improvement since starting physical therapy  He mentions he still feels as though it still is going to give out on him when he walks, however it does not happen quite as frequently  He mentions going up/down the steps is a lot better and is able to complete in a reciprocal pattern  He mentions pain at worst is about the same numerically but it does not hurt him as often  Pain  Current pain ratin  At best pain ratin  At worst pain ratin  Location: R Knee  Quality: dull ache  Relieving factors: rest  Aggravating factors: walking    Social Support  Steps to enter house: yes  Stairs in house: yes     Employment status: working  Treatments  No previous or current treatments  Patient Goals  Patient goals for therapy: decreased pain, independence with ADLs/IADLs, increased strength and return to sport/leisure activities  Patient goal: decrease pain with walking, go up/down steps better        Objective    Gait: decreased stance phase on RLE however improved since IE      Palpation: no tenderness noted upon examination  Knee AROM:  Right: WFL  Left: WFL    LE Strength (L,R)  Hip Flexion: 5/5 , 5/5  Hip Extension: 5/5 , 5/5  Hip Abduction: 4+/5 , 4+/5  Hip IR (90/90): 5/5 , 5/5  Hip ER (90/90): 5/5 , 4/5  Knee Extension: 5/5 , 4/5  Knee Flexion: 4+/5 , 5/5  Ankle DF: 5/5 , 5/5    Quad Set: good, good  SLR R: good, significantly improved since IE,    Flexibility: moderate tightness R hamstring and hip flexor    5x sit to stand: 20 21 seconds, minimal pain R knee      Balance:  SLB R: Firm 30 sec, Foam 20 seconds           Diagnosis: right knee pain   Precautions: hx of lyme disease   Goals: improve LE strength, decrease pain with ambulation   Manual Therapy 3/15/22 3/17/22 3/22/22  3/10/22   HSS        STM to medial knee TJH, PTA TJH, PTA   TJH, PTA   + patellar tendon   RE   SK                     Exercise Diary         Therapeutic Exercise        bike 5 mins 5 mins 5 min  5 mins   Hamstring stretch supine with strap NV  EOT 3x30 sec  Seated at EOT  3x30 sec   Prone HF Stretch with strap        Adductor stretch NV? 3x30 sec                               Neuromuscular Re-education        QS        QS + SLR  2x10 ea 2x10 ea 2# x15  2x10 ea   Bridges 20x    20x   Clamshells  20x   20x   Side Stepping        HR/TR        TKE        Tandem balance on foam 2x30 sec ea       Mini squats   TRX squat x10  15x   Step ups 6" 15x on R 8" 10x on R 8" x15 on R  6" 15x on R   SLB on foam 3x30 sec 3x30 sec on R 3x30 sec on R   On foam: 3x30 sec   Wobble board 2 mins ea 2 mins ea   2 mins ea   Therapeutic Activities        Pt Education HEP discussed  HEP reviewed

## 2022-03-24 ENCOUNTER — OFFICE VISIT (OUTPATIENT)
Dept: PHYSICAL THERAPY | Facility: CLINIC | Age: 62
End: 2022-03-24
Payer: COMMERCIAL

## 2022-03-24 DIAGNOSIS — M25.561 ACUTE PAIN OF RIGHT KNEE: Primary | ICD-10-CM

## 2022-03-24 PROCEDURE — 97112 NEUROMUSCULAR REEDUCATION: CPT

## 2022-03-24 NOTE — PROGRESS NOTES
Daily Note     Today's date: 3/24/2022  Patient name: Chirag Cortez  : 1960  MRN: [id number]  Referring provider: Odessa Monroy MD  Dx:   Encounter Diagnosis     ICD-10-CM    1  Acute pain of right knee  M25 561                   Subjective: Patient reports his pain is better, he has minimal pain during the day if he is walking around a lot mostly at work  Objective: See treatment diary below      Assessment: Tolerated treatment well  No compensations with charted exercises  Increased weight with SLR, cues to maintain knee ext  Added in TRX squats with shift with minimal pain  Patient demonstrated fatigue post treatment and would benefit from continued PT      Plan: Continue per plan of care        Diagnosis: right knee pain   Precautions: hx of lyme disease   Goals: improve LE strength, decrease pain with ambulation   Manual Therapy 3/15/22 3/17/22 3/24/22 3/8/22 3/10/22   HSS        STM to medial knee TJH, PTA TJH, PTA  TJH, PTA TJH, PTA   + patellar tendon                           Exercise Diary         Therapeutic Exercise        bike 5 mins 5 mins 5 min  5 mins   Hamstring stretch supine with strap NV   Seated at EOT  3x30 sec Seated at EOT  3x30 sec   Prone HF Stretch with strap        Adductor stretch NV? 3x30 sec                               Neuromuscular Re-education        QS    10x10 sec at 75%    QS + SLR  2x10 ea 2x10 ea 2x10 ea 1# 2x10 ea 2x10 ea   Bridges 20x  2x15  20x 20x   Clamshells  20x 2x10 peach TB 15x 20x   Side Stepping    GTB: 2x at mirror    HR/TR        TKE    GTB: 15x    Tandem balance on foam 2x30 sec ea   3x30 sec ea    Mini squats   TRX w R shift 10x  NV 15x   Step ups 6" 15x on R 8" 10x on R 8" 15x on R 6" 15x on R 6" 15x on R   SLB on foam 3x30 sec 3x30 sec on R  3x30 sec on R On foam: 3x30 sec   Wobble board 2 mins ea 2 mins ea 2 min  2 mins ea 2 mins ea   Therapeutic Activities        Pt Education HEP discussed   HEP discussed

## 2022-03-28 ENCOUNTER — APPOINTMENT (OUTPATIENT)
Dept: PHYSICAL THERAPY | Facility: CLINIC | Age: 62
End: 2022-03-28
Payer: COMMERCIAL

## 2022-04-05 ENCOUNTER — OFFICE VISIT (OUTPATIENT)
Dept: PHYSICAL THERAPY | Facility: CLINIC | Age: 62
End: 2022-04-05
Payer: COMMERCIAL

## 2022-04-05 DIAGNOSIS — M25.561 ACUTE PAIN OF RIGHT KNEE: Primary | ICD-10-CM

## 2022-04-05 PROCEDURE — 97530 THERAPEUTIC ACTIVITIES: CPT | Performed by: PHYSICAL THERAPIST

## 2022-04-05 PROCEDURE — 97110 THERAPEUTIC EXERCISES: CPT | Performed by: PHYSICAL THERAPIST

## 2022-04-05 PROCEDURE — 97112 NEUROMUSCULAR REEDUCATION: CPT | Performed by: PHYSICAL THERAPIST

## 2022-04-05 NOTE — PROGRESS NOTES
Daily Note     Today's date: 2022  Patient name: Maryann Pringle  : 1960  MRN: [id number]  Referring provider: Zakia Nugent MD  Dx:   Encounter Diagnosis     ICD-10-CM    1  Acute pain of right knee  M25 561                   Subjective: patient reports he is feeling good today, no pain pre treatment  Objective: See treatment diary below      Assessment: Patient tolerated treatment well  He was able to progress program as noted below  He reports muscular soreness post treatment  He would benefit from continued PT  Plan: Continue per plan of care        Diagnosis: right knee pain   Precautions: hx of lyme disease   Goals: improve LE strength, decrease pain with ambulation   Manual Therapy 3/15/22 3/17/22 3/24/22 4/5/22    HSS        STM to medial knee TJH, PTA TJH, PTA                              Exercise Diary         Therapeutic Exercise        bike 5 mins 5 mins 5 min 5 min    Hamstring stretch supine with strap NV       Prone HF Stretch with strap    30"x3 ea    Adductor stretch NV? 3x30 sec                               Neuromuscular Re-education        QS        QS + SLR  2x10 ea 2x10 ea 2x10 ea 1# 2x10 1 5#    Bridges 20x  2x15  2x15    Clamshells  20x 2x10 peach TB 2x15 RTB    Side Stepping        HR/TR        TKE        Tandem balance on foam 2x30 sec ea       Mini squats   TRX w R shift 10x  TRX 15x    Step ups 6" 15x on R 8" 10x on R 8" 15x on R 8" 20x on R    SLB on foam 3x30 sec 3x30 sec on R  30" x3    Wobble board 2 mins ea 2 mins ea 2 min  2 min ea    Therapeutic Activities        Pt Education HEP discussed

## 2022-04-14 ENCOUNTER — APPOINTMENT (OUTPATIENT)
Dept: PHYSICAL THERAPY | Facility: CLINIC | Age: 62
End: 2022-04-14
Payer: COMMERCIAL

## 2022-04-14 NOTE — PROGRESS NOTES
4/14/22 Patient has not attend last 3 scheduled appointments  He will be discharged from PT at this time  Patient to contact clinic PRN

## 2022-04-19 ENCOUNTER — APPOINTMENT (OUTPATIENT)
Dept: PHYSICAL THERAPY | Facility: CLINIC | Age: 62
End: 2022-04-19
Payer: COMMERCIAL

## 2022-04-21 ENCOUNTER — APPOINTMENT (OUTPATIENT)
Dept: PHYSICAL THERAPY | Facility: CLINIC | Age: 62
End: 2022-04-21
Payer: COMMERCIAL

## 2022-04-26 ENCOUNTER — APPOINTMENT (OUTPATIENT)
Dept: PHYSICAL THERAPY | Facility: CLINIC | Age: 62
End: 2022-04-26
Payer: COMMERCIAL

## 2022-04-28 ENCOUNTER — APPOINTMENT (OUTPATIENT)
Dept: PHYSICAL THERAPY | Facility: CLINIC | Age: 62
End: 2022-04-28
Payer: COMMERCIAL

## 2022-04-28 ENCOUNTER — TELEPHONE (OUTPATIENT)
Dept: FAMILY MEDICINE CLINIC | Facility: CLINIC | Age: 62
End: 2022-04-28

## 2022-04-28 NOTE — TELEPHONE ENCOUNTER
Patients daughter called regarding patient having rib pain when coughing and it has been going on for 2 weeks    Patients daughter was advised to take him to urgent care to be evaluated and have an possible xray

## 2022-07-06 ENCOUNTER — RA CDI HCC (OUTPATIENT)
Dept: OTHER | Facility: HOSPITAL | Age: 62
End: 2022-07-06

## 2022-07-06 NOTE — PROGRESS NOTES
[facility]  coding opportunities          Chart Reviewed number of suggestions sent to Provider: 1  [phone]     Patients Insurance        Commercial Insurance: [employer]

## 2022-09-08 ENCOUNTER — OFFICE VISIT (OUTPATIENT)
Dept: FAMILY MEDICINE CLINIC | Facility: CLINIC | Age: 62
End: 2022-09-08
Payer: COMMERCIAL

## 2022-09-08 VITALS
HEIGHT: 69 IN | OXYGEN SATURATION: 98 % | HEART RATE: 55 BPM | RESPIRATION RATE: 16 BRPM | DIASTOLIC BLOOD PRESSURE: 80 MMHG | BODY MASS INDEX: 28.29 KG/M2 | SYSTOLIC BLOOD PRESSURE: 158 MMHG | WEIGHT: 191 LBS

## 2022-09-08 DIAGNOSIS — S86.911A MUSCLE STRAIN OF RIGHT LOWER LEG, INITIAL ENCOUNTER: Primary | ICD-10-CM

## 2022-09-08 PROCEDURE — 99213 OFFICE O/P EST LOW 20 MIN: CPT | Performed by: FAMILY MEDICINE

## 2022-09-08 PROCEDURE — 3725F SCREEN DEPRESSION PERFORMED: CPT | Performed by: FAMILY MEDICINE

## 2022-09-08 NOTE — PROGRESS NOTES
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 for  He did PT afterwards and his knee felt much better  He has been fine up until about last week where his legs hurt from inside his thigh to the inferior knee on the medial side started hurting  No trauma  Did not twist it    He was just concerned about his history of Lyme    The following portions of the patient's history were reviewed and updated as appropriate: allergies, current medications, past family history, past medical history, past social history, past surgical history and problem list     Review of Systems   Musculoskeletal: Positive for arthralgias  Objective:      /80 (BP Location: Right arm, Patient Position: Sitting, Cuff Size: Standard)   Pulse 55   Resp 16   Ht 5' 9" (1 753 m)   Wt 86 6 kg (191 lb)   SpO2 98%   BMI 28 21 kg/m²     No visits with results within 2 Week(s) from this visit     Latest known visit with results is:   Appointment on 02/19/2022   Component Date Value    Hepatitis C Ab 02/19/2022 Non-reactive     HIV-1/HIV-2 Ab 02/19/2022 Non-Reactive     WBC 02/19/2022 7 42     RBC 02/19/2022 5 22     Hemoglobin 02/19/2022 14 6     Hematocrit 02/19/2022 44 0     MCV 02/19/2022 84     MCH 02/19/2022 28 0     MCHC 02/19/2022 33 2     RDW 02/19/2022 14 1     MPV 02/19/2022 12 2     Platelets 88/49/5187 216     nRBC 02/19/2022 0     Neutrophils Relative 02/19/2022 64     Immat GRANS % 02/19/2022 0     Lymphocytes Relative 02/19/2022 26     Monocytes Relative 02/19/2022 8     Eosinophils Relative 02/19/2022 2     Basophils Relative 02/19/2022 0     Neutrophils Absolute 02/19/2022 4 69     Immature Grans Absolute 02/19/2022 0 02     Lymphocytes Absolute 02/19/2022 1 94     Monocytes Absolute 02/19/2022 0 58     Eosinophils Absolute 02/19/2022 0 16     Basophils Absolute 02/19/2022 0 03     Sodium 02/19/2022 137     Potassium 02/19/2022 4 0     Chloride 02/19/2022 101     CO2 02/19/2022 29     ANION GAP 02/19/2022 7     BUN 02/19/2022 19     Creatinine 02/19/2022 0 74     Glucose, Fasting 02/19/2022 105     Calcium 02/19/2022 9 2     AST 02/19/2022 13 (A)    ALT 02/19/2022 18     Alkaline Phosphatase 02/19/2022 78 0     Total Protein 02/19/2022 8 1     Albumin 02/19/2022 4 3     Total Bilirubin 02/19/2022 0 98     eGFR 02/19/2022 100     Cholesterol 02/19/2022 218 (A)    Triglycerides 02/19/2022 138 0     HDL, Direct 02/19/2022 40     LDL Calculated 02/19/2022 150 (A)    Vit D, 25-Hydroxy 02/19/2022 9 9 (A)    Vitamin B-12 02/19/2022 191     PSA 02/19/2022 0 6           Physical Exam  Vitals and nursing note reviewed  Constitutional:       General: He is not in acute distress  Appearance: He is well-developed  HENT:      Head: Normocephalic and atraumatic  Cardiovascular:      Rate and Rhythm: Normal rate and regular rhythm  Heart sounds: Normal heart sounds  No murmur heard  Pulmonary:      Effort: Pulmonary effort is normal       Breath sounds: Normal breath sounds  No wheezing or rales  Abdominal:      General: Bowel sounds are normal  There is no distension  Palpations: Abdomen is soft  Tenderness: There is no abdominal tenderness  There is no guarding or rebound  Musculoskeletal:         General: No swelling, tenderness, deformity or signs of injury  Normal range of motion  Cervical back: Normal range of motion and neck supple  Lymphadenopathy:      Cervical: No cervical adenopathy  Skin:     General: Skin is warm and dry  Capillary Refill: Capillary refill takes less than 2 seconds  Findings: No rash  Neurological:      Mental Status: He is alert and oriented to person, place, and time  Cranial Nerves: No cranial nerve deficit  Sensory: No sensory deficit  Motor: No abnormal muscle tone  Psychiatric:         Behavior: Behavior normal          Thought Content:  Thought content normal          Judgment: Judgment normal          Provacative testing neg   Xray from last year with no OA seen     Keyanna Abdi MD  [place] [address]

## 2023-04-24 ENCOUNTER — OFFICE VISIT (OUTPATIENT)
Dept: FAMILY MEDICINE CLINIC | Facility: CLINIC | Age: 63
End: 2023-04-24

## 2023-04-24 VITALS
HEIGHT: 69 IN | BODY MASS INDEX: 29.03 KG/M2 | HEART RATE: 75 BPM | RESPIRATION RATE: 16 BRPM | DIASTOLIC BLOOD PRESSURE: 80 MMHG | WEIGHT: 196 LBS | SYSTOLIC BLOOD PRESSURE: 134 MMHG | OXYGEN SATURATION: 98 %

## 2023-04-24 DIAGNOSIS — E78.2 MIXED HYPERLIPIDEMIA: ICD-10-CM

## 2023-04-24 DIAGNOSIS — Z00.00 WELL ADULT EXAM: ICD-10-CM

## 2023-04-24 DIAGNOSIS — R01.1 MURMUR, CARDIAC: ICD-10-CM

## 2023-04-24 DIAGNOSIS — Z79.899 OTHER LONG TERM (CURRENT) DRUG THERAPY: ICD-10-CM

## 2023-04-24 DIAGNOSIS — Z12.11 SCREENING FOR MALIGNANT NEOPLASM OF COLON: ICD-10-CM

## 2023-04-24 DIAGNOSIS — R53.83 FATIGUE, UNSPECIFIED TYPE: ICD-10-CM

## 2023-04-24 DIAGNOSIS — K21.9 GASTROESOPHAGEAL REFLUX DISEASE WITHOUT ESOPHAGITIS: ICD-10-CM

## 2023-04-24 DIAGNOSIS — E53.8 B12 DEFICIENCY: ICD-10-CM

## 2023-04-24 DIAGNOSIS — Z12.5 SCREENING FOR PROSTATE CANCER: ICD-10-CM

## 2023-04-24 DIAGNOSIS — L20.82 FLEXURAL ECZEMA: ICD-10-CM

## 2023-04-24 DIAGNOSIS — E55.9 VITAMIN D DEFICIENCY: Primary | ICD-10-CM

## 2023-04-24 PROBLEM — A69.23 LYME ARTHRITIS OF KNEE (HCC): Status: RESOLVED | Noted: 2021-12-06 | Resolved: 2023-04-24

## 2023-04-24 RX ORDER — OMEPRAZOLE 20 MG/1
20 CAPSULE, DELAYED RELEASE ORAL
Qty: 90 CAPSULE | Refills: 1 | Status: SHIPPED | OUTPATIENT
Start: 2023-04-24

## 2023-04-24 RX ORDER — CLOBETASOL PROPIONATE 0.5 MG/G
CREAM TOPICAL 2 TIMES DAILY
Qty: 60 G | Refills: 0 | Status: SHIPPED | OUTPATIENT
Start: 2023-04-24

## 2023-04-24 NOTE — PROGRESS NOTES
Assessment/Plan:    1  Vitamin D deficiency  -     Vitamin D 25 hydroxy; Future    2  B12 deficiency    3  Well adult exam  -     CBC and differential; Future  -     Comprehensive metabolic panel; Future  -     Lipid Panel with Direct LDL reflex; Future  -     UA w Reflex to Microscopic w Reflex to Culture -Lab Collect; Future; Expected date: 04/24/2023    4  Screening for prostate cancer  -     PSA, Total Screen; Future    5  Screening for malignant neoplasm of colon  -     Cologuard    6  Fatigue, unspecified type  -     Testosterone, free, total; Future    7  Mixed hyperlipidemia  -     Lipid Panel with Direct LDL reflex; Future    8  Other long term (current) drug therapy  -     HEMOGLOBIN A1C W/ EAG ESTIMATION; Future    9  BMI 28 0-28 9,adult    10  Flexural eczema  -     clobetasol (TEMOVATE) 0 05 % cream; Apply topically 2 (two) times a day    11  Murmur, cardiac  -     Echo complete w/ contrast if indicated; Future; Expected date: 04/24/2023    12  Gastroesophageal reflux disease without esophagitis  -     omeprazole (PriLOSEC) 20 mg delayed release capsule; Take 1 capsule (20 mg total) by mouth daily at bedtime       Subjective:      Patient ID: Iwona Jenkins is a 65 y o  male  HPI  Here to go over chronic issues and labs / imaging studies if applicable  The following portions of the patient's history were reviewed and updated as appropriate: allergies, current medications, past family history, past medical history, past social history, past surgical history and problem list     Review of Systems   Constitutional: Negative for activity change, appetite change and fever  HENT: Negative for congestion, nosebleeds and trouble swallowing  Eyes: Negative for itching  Respiratory: Negative for cough and chest tightness  Cardiovascular: Negative for chest pain and palpitations  Gastrointestinal: Negative for abdominal pain, constipation, diarrhea and nausea     Endocrine: Negative for cold "intolerance  Genitourinary: Negative for frequency  Musculoskeletal: Negative for gait problem and joint swelling  Skin: Negative for rash  Allergic/Immunologic: Negative for immunocompromised state  Neurological: Negative for dizziness, tremors, seizures, syncope and headaches  Psychiatric/Behavioral: Negative for hallucinations and suicidal ideas  Objective:      /80 (BP Location: Left arm, Patient Position: Sitting, Cuff Size: Large)   Pulse 75   Resp 16   Ht 5' 9\" (1 753 m)   Wt 88 9 kg (196 lb)   SpO2 98%   BMI 28 94 kg/m²     No visits with results within 2 Week(s) from this visit     Latest known visit with results is:   Appointment on 02/19/2022   Component Date Value   • Hepatitis C Ab 02/19/2022 Non-reactive    • HIV-1/HIV-2 Ab 02/19/2022 Non-Reactive    • WBC 02/19/2022 7 42    • RBC 02/19/2022 5 22    • Hemoglobin 02/19/2022 14 6    • Hematocrit 02/19/2022 44 0    • MCV 02/19/2022 84    • MCH 02/19/2022 28 0    • MCHC 02/19/2022 33 2    • RDW 02/19/2022 14 1    • MPV 02/19/2022 12 2    • Platelets 13/70/7611 216    • nRBC 02/19/2022 0    • Neutrophils Relative 02/19/2022 64    • Immat GRANS % 02/19/2022 0    • Lymphocytes Relative 02/19/2022 26    • Monocytes Relative 02/19/2022 8    • Eosinophils Relative 02/19/2022 2    • Basophils Relative 02/19/2022 0    • Neutrophils Absolute 02/19/2022 4 69    • Immature Grans Absolute 02/19/2022 0 02    • Lymphocytes Absolute 02/19/2022 1 94    • Monocytes Absolute 02/19/2022 0 58    • Eosinophils Absolute 02/19/2022 0 16    • Basophils Absolute 02/19/2022 0 03    • Sodium 02/19/2022 137    • Potassium 02/19/2022 4 0    • Chloride 02/19/2022 101    • CO2 02/19/2022 29    • ANION GAP 02/19/2022 7    • BUN 02/19/2022 19    • Creatinine 02/19/2022 0 74    • Glucose, Fasting 02/19/2022 105    • Calcium 02/19/2022 9 2    • AST 02/19/2022 13 (L)    • ALT 02/19/2022 18    • Alkaline Phosphatase 02/19/2022 78 0    • Total Protein 02/19/2022 " 8  1    • Albumin 02/19/2022 4 3    • Total Bilirubin 02/19/2022 0 98    • eGFR 02/19/2022 100    • Cholesterol 02/19/2022 218 (H)    • Triglycerides 02/19/2022 138 0    • HDL, Direct 02/19/2022 40    • LDL Calculated 02/19/2022 150 (H)    • Vit D, 25-Hydroxy 02/19/2022 9 9 (L)    • Vitamin B-12 02/19/2022 191    • PSA 02/19/2022 0 6           Physical Exam  Vitals and nursing note reviewed  Constitutional:       General: He is not in acute distress  Appearance: He is well-developed  HENT:      Head: Normocephalic and atraumatic  Cardiovascular:      Rate and Rhythm: Normal rate and regular rhythm  Heart sounds: Normal heart sounds  No murmur heard  Pulmonary:      Effort: Pulmonary effort is normal       Breath sounds: Normal breath sounds  No wheezing or rales  Abdominal:      General: Bowel sounds are normal  There is no distension  Palpations: Abdomen is soft  Tenderness: There is no abdominal tenderness  There is no guarding or rebound  Musculoskeletal:         General: No tenderness  Normal range of motion  Cervical back: Normal range of motion and neck supple  Lymphadenopathy:      Cervical: No cervical adenopathy  Skin:     General: Skin is warm and dry  Capillary Refill: Capillary refill takes less than 2 seconds  Findings: No rash  Neurological:      Mental Status: He is alert and oriented to person, place, and time  Cranial Nerves: No cranial nerve deficit  Sensory: No sensory deficit  Motor: No abnormal muscle tone  Psychiatric:         Behavior: Behavior normal          Thought Content: Thought content normal          Judgment: Judgment normal             BMI Counseling: Body mass index is 28 94 kg/m²  The BMI is above normal  Nutrition recommendations include decreasing portion sizes, encouraging healthy choices of fruits and vegetables, decreasing fast food intake, consuming healthier snacks and limiting drinks that contain sugar  Exercise recommendations include exercising 3-5 times per week  No pharmacotherapy was ordered  Rationale for BMI follow-up plan is due to patient being overweight or obese  Depression Screening and Follow-up Plan: Patient was screened for depression during today's encounter   They screened negative with a PHQ-2 score of 0       Shannon Kimble MD  [place] [address]

## 2023-05-04 ENCOUNTER — HOSPITAL ENCOUNTER (OUTPATIENT)
Dept: NON INVASIVE DIAGNOSTICS | Facility: HOSPITAL | Age: 63
Discharge: HOME | End: 2023-05-04

## 2023-05-04 VITALS
BODY MASS INDEX: 29.03 KG/M2 | WEIGHT: 196 LBS | HEART RATE: 75 BPM | SYSTOLIC BLOOD PRESSURE: 134 MMHG | HEIGHT: 69 IN | DIASTOLIC BLOOD PRESSURE: 80 MMHG

## 2023-05-04 DIAGNOSIS — R01.1 MURMUR, CARDIAC: ICD-10-CM

## 2023-05-05 LAB
AORTIC ROOT: 3.1 CM
APICAL FOUR CHAMBER EJECTION FRACTION: 62 %
ASCENDING AORTA: 3.5 CM
E WAVE DECELERATION TIME: 323 MS
FRACTIONAL SHORTENING: 35 % (ref 28–44)
INTERVENTRICULAR SEPTUM IN DIASTOLE (PARASTERNAL SHORT AXIS VIEW): 1 CM
INTERVENTRICULAR SEPTUM: 1 CM (ref 0.6–1.1)
LAAS-AP2: 22.7 CM2
LAAS-AP4: 21.8 CM2
LEFT ATRIUM SIZE: 3.9 CM
LEFT INTERNAL DIMENSION IN SYSTOLE: 3.2 CM (ref 2.1–4)
LEFT VENTRICULAR INTERNAL DIMENSION IN DIASTOLE: 4.9 CM (ref 3.5–6)
LEFT VENTRICULAR POSTERIOR WALL IN END DIASTOLE: 1 CM
LEFT VENTRICULAR STROKE VOLUME: 72 ML
LVSV (TEICH): 72 ML
MV E'TISSUE VEL-SEP: 9 CM/S
MV PEAK A VEL: 1.06 M/S
MV PEAK E VEL: 104 CM/S
RIGHT ATRIAL 2D VOLUME: 66 ML
RIGHT ATRIUM AREA SYSTOLE A4C: 20.3 CM2
RIGHT VENTRICLE ID DIMENSION: 3.8 CM
SL CV LEFT ATRIUM LENGTH A2C: 5.6 CM
SL CV LV EF: 60
SL CV PED ECHO LEFT VENTRICLE DIASTOLIC VOLUME (MOD BIPLANE) 2D: 111 ML
SL CV PED ECHO LEFT VENTRICLE SYSTOLIC VOLUME (MOD BIPLANE) 2D: 40 ML
TRICUSPID ANNULAR PLANE SYSTOLIC EXCURSION: 2.7 CM

## 2023-05-06 ENCOUNTER — APPOINTMENT (OUTPATIENT)
Dept: LAB | Facility: HOSPITAL | Age: 63
End: 2023-05-06

## 2023-05-06 DIAGNOSIS — Z12.5 SCREENING FOR PROSTATE CANCER: ICD-10-CM

## 2023-05-06 DIAGNOSIS — E55.9 VITAMIN D DEFICIENCY: ICD-10-CM

## 2023-05-06 DIAGNOSIS — Z79.899 OTHER LONG TERM (CURRENT) DRUG THERAPY: ICD-10-CM

## 2023-05-06 DIAGNOSIS — Z00.00 WELL ADULT EXAM: ICD-10-CM

## 2023-05-06 DIAGNOSIS — R53.83 FATIGUE, UNSPECIFIED TYPE: ICD-10-CM

## 2023-05-06 DIAGNOSIS — E78.2 MIXED HYPERLIPIDEMIA: ICD-10-CM

## 2023-05-06 LAB
25(OH)D3 SERPL-MCNC: 12.8 NG/ML (ref 30–100)
ALBUMIN SERPL BCP-MCNC: 4.2 G/DL (ref 3.5–5)
ALP SERPL-CCNC: 75 U/L (ref 34–104)
ALT SERPL W P-5'-P-CCNC: 17 U/L (ref 7–52)
ANION GAP SERPL CALCULATED.3IONS-SCNC: 9 MMOL/L (ref 4–13)
AST SERPL W P-5'-P-CCNC: 14 U/L (ref 13–39)
BASOPHILS # BLD AUTO: 0.04 THOUSANDS/ÂΜL (ref 0–0.1)
BASOPHILS NFR BLD AUTO: 1 % (ref 0–1)
BILIRUB SERPL-MCNC: 0.65 MG/DL (ref 0.2–1)
BILIRUB UR QL STRIP: NEGATIVE
BUN SERPL-MCNC: 23 MG/DL (ref 5–25)
CALCIUM SERPL-MCNC: 9.2 MG/DL (ref 8.4–10.2)
CHLORIDE SERPL-SCNC: 105 MMOL/L (ref 96–108)
CHOLEST SERPL-MCNC: 182 MG/DL
CLARITY UR: CLEAR
CO2 SERPL-SCNC: 25 MMOL/L (ref 21–32)
COLOR UR: YELLOW
CREAT SERPL-MCNC: 0.77 MG/DL (ref 0.6–1.3)
EOSINOPHIL # BLD AUTO: 0.12 THOUSAND/ÂΜL (ref 0–0.61)
EOSINOPHIL NFR BLD AUTO: 2 % (ref 0–6)
ERYTHROCYTE [DISTWIDTH] IN BLOOD BY AUTOMATED COUNT: 13.5 % (ref 11.6–15.1)
GFR SERPL CREATININE-BSD FRML MDRD: 97 ML/MIN/1.73SQ M
GLUCOSE P FAST SERPL-MCNC: 105 MG/DL (ref 65–99)
GLUCOSE UR STRIP-MCNC: NEGATIVE MG/DL
HCT VFR BLD AUTO: 43.3 % (ref 36.5–49.3)
HDLC SERPL-MCNC: 39 MG/DL
HGB BLD-MCNC: 14.2 G/DL (ref 12–17)
HGB UR QL STRIP.AUTO: NEGATIVE
IMM GRANULOCYTES # BLD AUTO: 0.01 THOUSAND/UL (ref 0–0.2)
IMM GRANULOCYTES NFR BLD AUTO: 0 % (ref 0–2)
KETONES UR STRIP-MCNC: NEGATIVE MG/DL
LDLC SERPL CALC-MCNC: 118 MG/DL (ref 0–100)
LEUKOCYTE ESTERASE UR QL STRIP: NEGATIVE
LYMPHOCYTES # BLD AUTO: 1.8 THOUSANDS/ÂΜL (ref 0.6–4.47)
LYMPHOCYTES NFR BLD AUTO: 28 % (ref 14–44)
MCH RBC QN AUTO: 28.6 PG (ref 26.8–34.3)
MCHC RBC AUTO-ENTMCNC: 32.8 G/DL (ref 31.4–37.4)
MCV RBC AUTO: 87 FL (ref 82–98)
MONOCYTES # BLD AUTO: 0.54 THOUSAND/ÂΜL (ref 0.17–1.22)
MONOCYTES NFR BLD AUTO: 8 % (ref 4–12)
NEUTROPHILS # BLD AUTO: 4.04 THOUSANDS/ÂΜL (ref 1.85–7.62)
NEUTS SEG NFR BLD AUTO: 61 % (ref 43–75)
NITRITE UR QL STRIP: NEGATIVE
NRBC BLD AUTO-RTO: 0 /100 WBCS
PH UR STRIP.AUTO: 7 [PH]
PLATELET # BLD AUTO: 230 THOUSANDS/UL (ref 149–390)
PMV BLD AUTO: 11.7 FL (ref 8.9–12.7)
POTASSIUM SERPL-SCNC: 4 MMOL/L (ref 3.5–5.3)
PROT SERPL-MCNC: 7.8 G/DL (ref 6.4–8.4)
PROT UR STRIP-MCNC: NEGATIVE MG/DL
PSA SERPL-MCNC: 0.4 NG/ML (ref 0–4)
RBC # BLD AUTO: 4.96 MILLION/UL (ref 3.88–5.62)
SODIUM SERPL-SCNC: 139 MMOL/L (ref 135–147)
SP GR UR STRIP.AUTO: 1.02 (ref 1–1.03)
TRIGL SERPL-MCNC: 125 MG/DL
UROBILINOGEN UR QL STRIP.AUTO: 1 E.U./DL
WBC # BLD AUTO: 6.55 THOUSAND/UL (ref 4.31–10.16)

## 2023-05-07 LAB
EST. AVERAGE GLUCOSE BLD GHB EST-MCNC: 114 MG/DL
HBA1C MFR BLD: 5.6 %

## 2023-05-08 ENCOUNTER — TELEPHONE (OUTPATIENT)
Dept: FAMILY MEDICINE CLINIC | Facility: CLINIC | Age: 63
End: 2023-05-08

## 2023-05-08 DIAGNOSIS — E55.9 VITAMIN D DEFICIENCY: Primary | ICD-10-CM

## 2023-05-08 RX ORDER — ERGOCALCIFEROL 1.25 MG/1
50000 CAPSULE ORAL WEEKLY
Qty: 12 CAPSULE | Refills: 0 | Status: SHIPPED | OUTPATIENT
Start: 2023-05-08

## 2023-05-08 NOTE — TELEPHONE ENCOUNTER
----- Message from Denise Day MD sent at 5/8/2023  6:01 AM EDT -----  Vit D is low 50K once a week for 3 months then retest level then to make sure its better, and more than likely a daily to maintain after that   Either an MVI or 2K daily is fine in general  (ill order it)     And everything else looks good just watch the diet and the cholesterol but it working well

## 2023-05-08 NOTE — TELEPHONE ENCOUNTER
----- Message from Da Conner MD sent at 5/8/2023  5:35 AM EDT -----  Heart looks like it is pumping well and all the valves look good

## 2023-05-09 LAB
TESTOST FREE SERPL-MCNC: 6.8 PG/ML (ref 6.6–18.1)
TESTOST SERPL-MCNC: 461 NG/DL (ref 264–916)

## 2023-05-10 ENCOUNTER — TELEPHONE (OUTPATIENT)
Dept: FAMILY MEDICINE CLINIC | Facility: CLINIC | Age: 63
End: 2023-05-10

## 2023-05-12 LAB — COLOGUARD RESULT REPORTABLE: NEGATIVE

## 2023-05-16 ENCOUNTER — TELEPHONE (OUTPATIENT)
Dept: FAMILY MEDICINE CLINIC | Facility: CLINIC | Age: 63
End: 2023-05-16

## 2023-07-24 DIAGNOSIS — E55.9 VITAMIN D DEFICIENCY: ICD-10-CM

## 2023-07-24 RX ORDER — ERGOCALCIFEROL 1.25 MG/1
CAPSULE ORAL
Qty: 12 CAPSULE | Refills: 0 | Status: SHIPPED | OUTPATIENT
Start: 2023-07-24

## 2023-08-30 ENCOUNTER — TELEPHONE (OUTPATIENT)
Dept: FAMILY MEDICINE CLINIC | Facility: CLINIC | Age: 63
End: 2023-08-30

## 2023-08-30 NOTE — TELEPHONE ENCOUNTER
Patient's daughter called and stated her dad has been having palpitations, some shortness of breath on exertion and has to sit down to get back to normal. I spoke to Dr. Robert Bryan who advises patient go to ER to be evaluated and daughter was in agreement.   Also told her that he was putting in a referral order for a cardiologist he can follow up with

## 2023-10-23 ENCOUNTER — OFFICE VISIT (OUTPATIENT)
Dept: FAMILY MEDICINE CLINIC | Facility: CLINIC | Age: 63
End: 2023-10-23
Payer: COMMERCIAL

## 2023-10-23 VITALS
SYSTOLIC BLOOD PRESSURE: 136 MMHG | HEIGHT: 69 IN | HEART RATE: 79 BPM | RESPIRATION RATE: 16 BRPM | OXYGEN SATURATION: 97 % | DIASTOLIC BLOOD PRESSURE: 82 MMHG | WEIGHT: 189 LBS | BODY MASS INDEX: 27.99 KG/M2

## 2023-10-23 DIAGNOSIS — Z79.899 OTHER LONG TERM (CURRENT) DRUG THERAPY: ICD-10-CM

## 2023-10-23 DIAGNOSIS — M25.561 CHRONIC PAIN OF RIGHT KNEE: ICD-10-CM

## 2023-10-23 DIAGNOSIS — E55.9 VITAMIN D DEFICIENCY: ICD-10-CM

## 2023-10-23 DIAGNOSIS — K21.9 GASTROESOPHAGEAL REFLUX DISEASE WITHOUT ESOPHAGITIS: ICD-10-CM

## 2023-10-23 DIAGNOSIS — G89.29 CHRONIC PAIN OF RIGHT KNEE: ICD-10-CM

## 2023-10-23 DIAGNOSIS — R01.1 MURMUR, CARDIAC: ICD-10-CM

## 2023-10-23 DIAGNOSIS — J20.9 ACUTE BRONCHITIS, UNSPECIFIED ORGANISM: Primary | ICD-10-CM

## 2023-10-23 DIAGNOSIS — R73.01 IFG (IMPAIRED FASTING GLUCOSE): ICD-10-CM

## 2023-10-23 DIAGNOSIS — R07.89 ATYPICAL CHEST PAIN: ICD-10-CM

## 2023-10-23 PROCEDURE — 99214 OFFICE O/P EST MOD 30 MIN: CPT | Performed by: FAMILY MEDICINE

## 2023-10-23 RX ORDER — OMEPRAZOLE 40 MG/1
40 CAPSULE, DELAYED RELEASE ORAL EVERY MORNING
Qty: 90 CAPSULE | Refills: 1 | Status: SHIPPED | OUTPATIENT
Start: 2023-10-23

## 2023-10-23 RX ORDER — AZITHROMYCIN 250 MG/1
TABLET, FILM COATED ORAL
Qty: 6 TABLET | Refills: 0 | Status: SHIPPED | OUTPATIENT
Start: 2023-10-23 | End: 2023-10-28

## 2023-10-23 RX ORDER — MELOXICAM 15 MG/1
15 TABLET ORAL DAILY
Qty: 30 TABLET | Refills: 0 | Status: SHIPPED | OUTPATIENT
Start: 2023-10-23

## 2023-10-23 RX ORDER — DEXAMETHASONE 2 MG/1
2 TABLET ORAL 2 TIMES DAILY WITH MEALS
Qty: 10 TABLET | Refills: 0 | Status: SHIPPED | OUTPATIENT
Start: 2023-10-23 | End: 2023-10-28

## 2023-10-23 NOTE — PROGRESS NOTES
Assessment/Plan:  1. Acute bronchitis, unspecified organism  -     azithromycin (Zithromax) 250 mg tablet; Take 2 tablets (500 mg total) by mouth daily for 1 day, THEN 1 tablet (250 mg total) daily for 4 days. -     dexamethasone (DECADRON) 2 mg tablet; Take 1 tablet (2 mg total) by mouth 2 (two) times a day with meals for 5 days    2. Gastroesophageal reflux disease without esophagitis  -     omeprazole (PriLOSEC) 40 MG capsule; Take 1 capsule (40 mg total) by mouth every morning    3. Vitamin D deficiency  -     Vitamin D 25 hydroxy; Future    4. IFG (impaired fasting glucose)  -     Basic metabolic panel; Future  -     HEMOGLOBIN A1C W/ EAG ESTIMATION; Future    5. Atypical chest pain  -     omeprazole (PriLOSEC) 40 MG capsule; Take 1 capsule (40 mg total) by mouth every morning    6. Chronic pain of right knee  -     Sedimentation rate, automated; Future  -     Lyme Total AB W Reflex to IGM/IGG; Future  -     XR knee 3 vw right non injury; Future; Expected date: 10/23/2023  -     meloxicam (Mobic) 15 mg tablet; Take 1 tablet (15 mg total) by mouth daily For pain    7. Other long term (current) drug therapy  -     HEMOGLOBIN A1C W/ EAG ESTIMATION; Future    8. Murmur, cardiac      Vitamin D deficiency  Discussed with the patient rechecking his vitamin D levels. Gastroesophageal reflux disorder  Discussed 40 mg omeprazole in the morning. Discussed on hyperacidity. Discussed, if his symptoms do not alleviate he will be referred to a cardiologist for a stress test or EKG test.     Cough  I have sent 2 prescriptions for medication which would help his cough which he will take for 5 days. Subjective:      Patient ID: Rohini Rodas is a 65 y.o. male. Mr. Rohini Rodas is a 81-year-old male who presents today for a 6-month follow-up. He has a history of vitamin D deficiency, hyperlipidemia, GERD, and eczema. He consents to use [facility] for today. He is accompanied by his wife.      Cough  The patient has a cough since 10/20/2023. He had chills on 10/21/2023. Vitamin D deficiency. He was given vitamin D twice a week for 3 months. Lyme's disease. He had leg pain   He had an x-ray back in 2021. Gastroesophageal reflux disease. He complained of chest pain and shortness of breath. He noticed something burning and he had a fast heartbeat. The following portions of the patient's history were reviewed and updated as appropriate: allergies, current medications, past family history, past medical history, past social history, past surgical history and problem list.    Review of Systems   Constitutional: Negative for activity change, appetite change and fever. HENT: Negative for congestion, nosebleeds and trouble swallowing. Eyes: Negative for itching. Respiratory: Negative for chest tightness. Positive for cough. Cardiovascular: Negative for chest pain and palpitations. Gastrointestinal: Negative for abdominal pain, constipation, diarrhea and nausea. Endocrine: Negative for cold intolerance. Genitourinary: Negative for frequency. Musculoskeletal: Negative for gait problem and joint swelling. Skin: Negative for rash. Allergic/Immunologic: Negative for immunocompromised state. Neurological: Negative for dizziness, tremors, seizures, syncope and headaches. Psychiatric/Behavioral: Negative for hallucinations and suicidal ideas. Objective:     /82 (BP Location: Right arm, Patient Position: Sitting, Cuff Size: Standard)   Pulse 79   Resp 16   Ht 5' 9" (1.753 m)   Wt 85.7 kg (189 lb)   SpO2 97%   BMI 27.91 kg/m²    Physical Exam  Vitals and nursing note reviewed. Constitutional:     General: Not in acute distress. Appearance: Well-developed. HENT:     Head: Normocephalic and atraumatic. Cardiovascular:     Rate and Rhythm: Normal rate and regular rhythm. Heart sounds: Normal heart sounds. No murmur heard.   Pulmonary:     Effort: Pulmonary effort is normal. Breath sounds: Normal breath sounds. No wheezing or rales. Abdominal:     General: Bowel sounds are normal. There is no distension. Palpations: Abdomen is soft. Tenderness: There is no abdominal tenderness. There is no guarding or rebound. Musculoskeletal:     General: No tenderness. Normal range of motion. Cervical back: Normal range of motion and neck supple. Lymphadenopathy:     Cervical: No cervical adenopathy. Skin:     General: Skin is warm and dry. Capillary Refill: Capillary refill takes less than 2 seconds. Findings: No rash. Neurological:     Mental Status: Alert and oriented to person, place, and time. Cranial Nerves: No cranial nerve deficit. Sensory: No sensory deficit. Motor: No abnormal muscle tone. Psychiatric:     Behavior: Behavior normal.     Thought Content: Thought content normal.     Judgment: Judgment normal.      No visits with results within 2 Week(s) from this visit.    Latest known visit with results is:   Appointment on 05/06/2023   Component Date Value   • WBC 05/06/2023 6.55    • RBC 05/06/2023 4.96    • Hemoglobin 05/06/2023 14.2    • Hematocrit 05/06/2023 43.3    • MCV 05/06/2023 87    • MCH 05/06/2023 28.6    • MCHC 05/06/2023 32.8    • RDW 05/06/2023 13.5    • MPV 05/06/2023 11.7    • Platelets 74/32/6505 230    • nRBC 05/06/2023 0    • Neutrophils Relative 05/06/2023 61    • Immat GRANS % 05/06/2023 0    • Lymphocytes Relative 05/06/2023 28    • Monocytes Relative 05/06/2023 8    • Eosinophils Relative 05/06/2023 2    • Basophils Relative 05/06/2023 1    • Neutrophils Absolute 05/06/2023 4.04    • Immature Grans Absolute 05/06/2023 0.01    • Lymphocytes Absolute 05/06/2023 1.80    • Monocytes Absolute 05/06/2023 0.54    • Eosinophils Absolute 05/06/2023 0.12    • Basophils Absolute 05/06/2023 0.04    • Sodium 05/06/2023 139    • Potassium 05/06/2023 4.0    • Chloride 05/06/2023 105    • CO2 05/06/2023 25    • ANION GAP 05/06/2023 9    • BUN 05/06/2023 23    • Creatinine 05/06/2023 0.77    • Glucose, Fasting 05/06/2023 105 (H)    • Calcium 05/06/2023 9.2    • AST 05/06/2023 14    • ALT 05/06/2023 17    • Alkaline Phosphatase 05/06/2023 75    • Total Protein 05/06/2023 7.8    • Albumin 05/06/2023 4.2    • Total Bilirubin 05/06/2023 0.65    • eGFR 05/06/2023 97    • Cholesterol 05/06/2023 182    • Triglycerides 05/06/2023 125    • HDL, Direct 05/06/2023 39 (L)    • LDL Calculated 05/06/2023 118 (H)    • Vit D, 25-Hydroxy 05/06/2023 12.8 (L)    • PSA 05/06/2023 0.4    • Color, UA 05/06/2023 Yellow    • Clarity, UA 05/06/2023 Clear    • Specific Gravity, UA 05/06/2023 1.020    • pH, UA 05/06/2023 7.0    • Leukocytes, UA 05/06/2023 Negative    • Nitrite, UA 05/06/2023 Negative    • Protein, UA 05/06/2023 Negative    • Glucose, UA 05/06/2023 Negative    • Ketones, UA 05/06/2023 Negative    • Urobilinogen, UA 05/06/2023 1.0    • Bilirubin, UA 05/06/2023 Negative    • Occult Blood, UA 05/06/2023 Negative    • Testosterone, Free 05/06/2023 6.8    • TESTOSTERONE TOTAL 05/06/2023 461    • Hemoglobin A1C 05/06/2023 5.6    • EAG 05/06/2023 114      I have personally reviewed results with the patient. His hemoglobin A 1c is 5.6 percent. He had an echocardiogram and ultrasound of the heart in the beginning of the year which showed. normal systolic and ejection fraction 60. Doris Munoz MD   [id number]      Transcribed for Doris Munoz MD, by Ke Camargo on 10/23/23 at 9:04 PM. Powered by [employer].

## 2023-10-24 ENCOUNTER — HOSPITAL ENCOUNTER (OUTPATIENT)
Dept: RADIOLOGY | Facility: HOSPITAL | Age: 63
Discharge: HOME | End: 2023-10-24
Payer: COMMERCIAL

## 2023-10-24 ENCOUNTER — APPOINTMENT (OUTPATIENT)
Dept: LAB | Facility: HOSPITAL | Age: 63
End: 2023-10-24
Payer: COMMERCIAL

## 2023-10-24 DIAGNOSIS — G89.29 CHRONIC PAIN OF RIGHT KNEE: ICD-10-CM

## 2023-10-24 DIAGNOSIS — R73.01 IFG (IMPAIRED FASTING GLUCOSE): ICD-10-CM

## 2023-10-24 DIAGNOSIS — E55.9 VITAMIN D DEFICIENCY: ICD-10-CM

## 2023-10-24 DIAGNOSIS — Z79.899 OTHER LONG TERM (CURRENT) DRUG THERAPY: ICD-10-CM

## 2023-10-24 DIAGNOSIS — M25.561 CHRONIC PAIN OF RIGHT KNEE: ICD-10-CM

## 2023-10-24 LAB
25(OH)D3 SERPL-MCNC: 21.6 NG/ML (ref 30–100)
ANION GAP SERPL CALCULATED.3IONS-SCNC: 7 MMOL/L
B BURGDOR IGG SERPL QL IA: ABNORMAL
B BURGDOR IGG+IGM SER QL IA: POSITIVE
B BURGDOR IGM SERPL QL IA: NEGATIVE
BUN SERPL-MCNC: 21 MG/DL (ref 5–25)
CALCIUM SERPL-MCNC: 9.4 MG/DL (ref 8.4–10.2)
CHLORIDE SERPL-SCNC: 101 MMOL/L (ref 96–108)
CO2 SERPL-SCNC: 28 MMOL/L (ref 21–32)
CREAT SERPL-MCNC: 0.77 MG/DL (ref 0.6–1.3)
ERYTHROCYTE [SEDIMENTATION RATE] IN BLOOD: 45 MM/HOUR (ref 0–19)
EST. AVERAGE GLUCOSE BLD GHB EST-MCNC: 126 MG/DL
GFR SERPL CREATININE-BSD FRML MDRD: 97 ML/MIN/1.73SQ M
GLUCOSE P FAST SERPL-MCNC: 96 MG/DL (ref 65–99)
HBA1C MFR BLD: 6 %
POTASSIUM SERPL-SCNC: 4 MMOL/L (ref 3.5–5.3)
SODIUM SERPL-SCNC: 136 MMOL/L (ref 135–147)

## 2023-10-24 PROCEDURE — 82306 VITAMIN D 25 HYDROXY: CPT

## 2023-10-24 PROCEDURE — 85652 RBC SED RATE AUTOMATED: CPT

## 2023-10-24 PROCEDURE — 83036 HEMOGLOBIN GLYCOSYLATED A1C: CPT

## 2023-10-24 PROCEDURE — 86617 LYME DISEASE ANTIBODY: CPT

## 2023-10-24 PROCEDURE — 80048 BASIC METABOLIC PNL TOTAL CA: CPT

## 2023-10-24 PROCEDURE — 36415 COLL VENOUS BLD VENIPUNCTURE: CPT

## 2023-10-24 PROCEDURE — 73562 X-RAY EXAM OF KNEE 3: CPT

## 2023-10-24 PROCEDURE — 86618 LYME DISEASE ANTIBODY: CPT

## 2023-10-25 ENCOUNTER — TELEPHONE (OUTPATIENT)
Dept: FAMILY MEDICINE CLINIC | Facility: CLINIC | Age: 63
End: 2023-10-25

## 2023-10-25 DIAGNOSIS — G89.29 CHRONIC PAIN OF RIGHT KNEE: Primary | ICD-10-CM

## 2023-10-25 DIAGNOSIS — M25.561 CHRONIC PAIN OF RIGHT KNEE: Primary | ICD-10-CM

## 2023-10-25 NOTE — TELEPHONE ENCOUNTER
----- Message from Marcela Hedrick MD sent at 10/25/2023  5:58 AM EDT -----  You are a PreDiabetic: watch the white rice, white bread, white Potato, and pasta. Maybe try the brown /whole wheat versions, or just limit how much and how often. Also be careful of the juices and sodas, and of course the sweets.      The knee is NOT lyme - this just shows that he HAD lyme in the past (which we know)   The inflammation marker is elevated though so ill get you to ortho - they should call you     Take vit D 5K daily please

## 2023-11-03 ENCOUNTER — TELEPHONE (OUTPATIENT)
Dept: FAMILY MEDICINE CLINIC | Facility: CLINIC | Age: 63
End: 2023-11-03

## 2023-11-03 NOTE — TELEPHONE ENCOUNTER
----- Message from Christian Anaya MD sent at 11/3/2023 12:43 PM EDT -----  This is ok   Maybe see ortho if it is still bothering you   But I would do the home exercise for it for now of course

## 2024-04-26 ENCOUNTER — OFFICE VISIT (OUTPATIENT)
Dept: FAMILY MEDICINE CLINIC | Facility: CLINIC | Age: 64
End: 2024-04-26
Payer: COMMERCIAL

## 2024-04-26 VITALS
HEART RATE: 76 BPM | WEIGHT: 200.6 LBS | HEIGHT: 69 IN | DIASTOLIC BLOOD PRESSURE: 80 MMHG | RESPIRATION RATE: 16 BRPM | BODY MASS INDEX: 29.71 KG/M2 | SYSTOLIC BLOOD PRESSURE: 142 MMHG | OXYGEN SATURATION: 97 %

## 2024-04-26 DIAGNOSIS — Z12.5 SCREENING FOR PROSTATE CANCER: Primary | ICD-10-CM

## 2024-04-26 DIAGNOSIS — R00.2 PALPITATIONS: ICD-10-CM

## 2024-04-26 DIAGNOSIS — R01.1 MURMUR, CARDIAC: ICD-10-CM

## 2024-04-26 DIAGNOSIS — I10 PRIMARY HYPERTENSION: ICD-10-CM

## 2024-04-26 DIAGNOSIS — R73.03 PREDIABETES: ICD-10-CM

## 2024-04-26 DIAGNOSIS — R07.89 ATYPICAL CHEST PAIN: ICD-10-CM

## 2024-04-26 DIAGNOSIS — E55.9 VITAMIN D DEFICIENCY: ICD-10-CM

## 2024-04-26 DIAGNOSIS — L20.82 FLEXURAL ECZEMA: ICD-10-CM

## 2024-04-26 DIAGNOSIS — Z79.899 OTHER LONG TERM (CURRENT) DRUG THERAPY: ICD-10-CM

## 2024-04-26 DIAGNOSIS — E78.2 MIXED HYPERLIPIDEMIA: ICD-10-CM

## 2024-04-26 DIAGNOSIS — E61.1 IRON DEFICIENCY: ICD-10-CM

## 2024-04-26 PROCEDURE — 99214 OFFICE O/P EST MOD 30 MIN: CPT | Performed by: FAMILY MEDICINE

## 2024-04-26 PROCEDURE — 99396 PREV VISIT EST AGE 40-64: CPT | Performed by: FAMILY MEDICINE

## 2024-04-26 RX ORDER — CLOBETASOL PROPIONATE 0.5 MG/G
CREAM TOPICAL 2 TIMES DAILY
Qty: 60 G | Refills: 0 | Status: SHIPPED | OUTPATIENT
Start: 2024-04-26

## 2024-04-26 NOTE — PROGRESS NOTES
Name: Ramon Mora      : 1960      MRN: [id number]  Encounter Provider: Herbert Garcia MD  Encounter Date: 2024   Encounter department: [facility]    Assessment & Plan     Assessment & Plan  1. Hypertension.  The patient's blood pressure was noted to be marginally elevated during this visit. The patient was advised against the acquisition of a home blood pressure monitor.    2. Palpitations.  The patient was advised to monitor his smartwatch for any irregularities. Laboratory tests will be conducted to assess electrolyte balance, thyroid function, glucose levels, and anemia.    3. Vitamin D deficiency.  The patient's vitamin D levels were previously low. Vitamin D levels will be reassessed.    4. Right-sided low back pain.  The patient was encouraged to perform stretching exercises for his back.    5. Weight management.  The patient's weight has increased by 4 pounds since 2023, and 10 pounds over the past 6 months. The patient was counseled to reduce his snacking and carbohydrate intake.    6. Cardiac murmur.  A murmur was detected during the patient's last visit. A stress echocardiogram will be ordered.    7. Dementia.  The patient was encouraged to engage in cognitive exercises such as reading, crossword puzzles, and jigsaw puzzles.    Follow-up  The patient is scheduled for a follow-up visit in 4 months.    Depression Screening and Follow-up Plan: Patient was screened for depression during today's encounter. They screened negative with a PHQ-2 score of 0.      Orders Placed This Encounter   Procedures    Magnesium    Comprehensive metabolic panel    TSH, 3rd generation with Free T4 reflex    PSA, Total Screen    Hemoglobin A1C    CBC and differential    Lipid Panel with Direct LDL reflex    Vitamin D 25 hydroxy    Echo complete w/ contrast if indicated     1. Screening for prostate cancer  -     Magnesium  -     Comprehensive metabolic panel  -     TSH, 3rd  generation with Free T4 reflex  -     PSA, Total Screen; Future  -     Hemoglobin A1C  -     CBC and differential  -     Lipid Panel with Direct LDL reflex  -     Iron Panel (Includes Ferritin, Iron Sat%, Iron, and TIBC)  -     Vitamin D 25 hydroxy    2. Other long term (current) drug therapy  -     Magnesium  -     Comprehensive metabolic panel  -     TSH, 3rd generation with Free T4 reflex  -     PSA, Total Screen; Future  -     Hemoglobin A1C  -     CBC and differential  -     Lipid Panel with Direct LDL reflex  -     Iron Panel (Includes Ferritin, Iron Sat%, Iron, and TIBC)  -     Vitamin D 25 hydroxy    3. Mixed hyperlipidemia  -     Magnesium  -     Comprehensive metabolic panel  -     TSH, 3rd generation with Free T4 reflex  -     PSA, Total Screen; Future  -     Hemoglobin A1C  -     CBC and differential  -     Lipid Panel with Direct LDL reflex  -     Iron Panel (Includes Ferritin, Iron Sat%, Iron, and TIBC)  -     Vitamin D 25 hydroxy    4. Iron deficiency  -     Magnesium  -     Comprehensive metabolic panel  -     TSH, 3rd generation with Free T4 reflex  -     PSA, Total Screen; Future  -     Hemoglobin A1C  -     CBC and differential  -     Lipid Panel with Direct LDL reflex  -     Iron Panel (Includes Ferritin, Iron Sat%, Iron, and TIBC)  -     Vitamin D 25 hydroxy    5. Vitamin D deficiency  -     Magnesium  -     Comprehensive metabolic panel  -     TSH, 3rd generation with Free T4 reflex  -     PSA, Total Screen; Future  -     Hemoglobin A1C  -     CBC and differential  -     Lipid Panel with Direct LDL reflex  -     Iron Panel (Includes Ferritin, Iron Sat%, Iron, and TIBC)  -     Vitamin D 25 hydroxy    6. Palpitations  -     Magnesium  -     Comprehensive metabolic panel  -     TSH, 3rd generation with Free T4 reflex  -     PSA, Total Screen; Future  -     Hemoglobin A1C  -     CBC and differential  -     Lipid Panel with Direct LDL reflex  -     Iron Panel (Includes Ferritin, Iron Sat%, Iron, and  TIBC)  -     Vitamin D 25 hydroxy    7. Prediabetes  -     Magnesium  -     Comprehensive metabolic panel  -     TSH, 3rd generation with Free T4 reflex  -     PSA, Total Screen; Future  -     Hemoglobin A1C  -     CBC and differential  -     Lipid Panel with Direct LDL reflex  -     Iron Panel (Includes Ferritin, Iron Sat%, Iron, and TIBC)  -     Vitamin D 25 hydroxy    8. Primary hypertension  -     Magnesium  -     Comprehensive metabolic panel  -     TSH, 3rd generation with Free T4 reflex  -     PSA, Total Screen; Future  -     Hemoglobin A1C  -     CBC and differential  -     Lipid Panel with Direct LDL reflex  -     Iron Panel (Includes Ferritin, Iron Sat%, Iron, and TIBC)  -     Vitamin D 25 hydroxy  -     Echo stress test, exercise; Future; Expected date: 04/26/2024    9. Murmur, cardiac  -     Echo complete w/ contrast if indicated; Future; Expected date: 04/26/2024  -     Echo stress test, exercise; Future; Expected date: 04/26/2024    10. Atypical chest pain  -     Echo complete w/ contrast if indicated; Future; Expected date: 04/26/2024  -     Echo stress test, exercise; Future; Expected date: 04/26/2024    11. Flexural eczema  -     clobetasol (TEMOVATE) 0.05 % cream; Apply topically 2 (two) times a day          Subjective      History of Present Illness  The patient is a 62-year-old male who presents for evaluation of multiple medical concerns. He is accompanied by his wife.    The patient does not monitor his blood pressure at home due to lack of a home blood pressure monitor. He reported a blood pressure reading of 133/62 yesterday, which he believes was alleviated by the double dosage of metoprolol.    The patient experiences palpitations during prolonged periods of walking. His smartwatch has not detected any irregularities.    The patient continues to take omeprazole, however, he has ceased its use due to no discernible difference in his symptoms.    The patient experiences intermittent  "right-sided low back pain, which does not radiate into his legs. The pain, which has been present for the past 2 weeks, was particularly severe enough to disrupt his sleep, but has since resolved.    The patient's wife reports that he consumes late at night and consumes approximately 2 beers daily. His weight was previously recorded as 140 pounds through regular walking.    Supplemental Information  He is feeling much better. He went to the neurology yesterday and is scheduled for an MRI of the head and neck. He used clobetasol cream for itching, which did help, but the itching came back. He works a lot in cement for a vehicle.  Review of Systems   Constitutional:  Negative for activity change, appetite change and fever.   HENT:  Negative for congestion, nosebleeds and trouble swallowing.    Eyes:  Negative for itching.   Respiratory:  Negative for cough and chest tightness.    Cardiovascular:  Positive for palpitations. Negative for chest pain.   Gastrointestinal:  Negative for abdominal pain, constipation, diarrhea and nausea.   Endocrine: Negative for cold intolerance.   Genitourinary:  Negative for frequency.   Musculoskeletal:  Negative for gait problem and joint swelling.   Skin:  Negative for rash.   Allergic/Immunologic: Negative for immunocompromised state.   Neurological:  Negative for dizziness, tremors, seizures, syncope and headaches.   Psychiatric/Behavioral:  Negative for hallucinations and suicidal ideas.          Objective     /80 (BP Location: Left arm, Patient Position: Sitting, Cuff Size: Standard)   Pulse 76   Resp 16   Ht 5' 9\" (1.753 m)   Wt 91 kg (200 lb 9.6 oz)   SpO2 97%   BMI 29.62 kg/m²     Physical Exam  A murmur was heard in the heart.    Vital Signs  The patient's weight is 200.6.  Physical Exam  Vitals and nursing note reviewed.   Constitutional:       General: He is not in acute distress.     Appearance: He is well-developed.   HENT:      Head: Normocephalic and " atraumatic.   Cardiovascular:      Rate and Rhythm: Normal rate and regular rhythm.      Heart sounds: Murmur heard.   Pulmonary:      Effort: Pulmonary effort is normal.      Breath sounds: Normal breath sounds. No wheezing or rales.   Abdominal:      General: Bowel sounds are normal. There is no distension.      Palpations: Abdomen is soft.      Tenderness: There is no abdominal tenderness. There is no guarding or rebound.   Musculoskeletal:         General: No tenderness. Normal range of motion.      Cervical back: Normal range of motion and neck supple.   Lymphadenopathy:      Cervical: No cervical adenopathy.   Skin:     General: Skin is warm and dry.      Capillary Refill: Capillary refill takes less than 2 seconds.      Findings: No rash.   Neurological:      Mental Status: He is alert and oriented to person, place, and time.      Cranial Nerves: No cranial nerve deficit.      Sensory: No sensory deficit.      Motor: No abnormal muscle tone.   Psychiatric:         Behavior: Behavior normal.         Thought Content: Thought content normal.         Judgment: Judgment normal.

## 2024-05-02 ENCOUNTER — APPOINTMENT (EMERGENCY)
Dept: CT IMAGING | Facility: HOSPITAL | Age: 64
End: 2024-05-02
Payer: COMMERCIAL

## 2024-05-02 ENCOUNTER — HOSPITAL ENCOUNTER (EMERGENCY)
Facility: HOSPITAL | Age: 64
Discharge: HOME | End: 2024-05-02
Attending: EMERGENCY MEDICINE
Payer: COMMERCIAL

## 2024-05-02 ENCOUNTER — APPOINTMENT (EMERGENCY)
Dept: RADIOLOGY | Facility: HOSPITAL | Age: 64
End: 2024-05-02
Payer: COMMERCIAL

## 2024-05-02 VITALS
SYSTOLIC BLOOD PRESSURE: 133 MMHG | RESPIRATION RATE: 16 BRPM | TEMPERATURE: 97.8 F | HEART RATE: 68 BPM | DIASTOLIC BLOOD PRESSURE: 81 MMHG | OXYGEN SATURATION: 99 %

## 2024-05-02 DIAGNOSIS — R07.89 CHEST WALL PAIN: Primary | ICD-10-CM

## 2024-05-02 LAB
2HR DELTA HS TROPONIN: 0 NG/L
ALBUMIN SERPL BCP-MCNC: 4.2 G/DL (ref 3.5–5)
ALP SERPL-CCNC: 67 U/L (ref 34–104)
ALT SERPL W P-5'-P-CCNC: 24 U/L (ref 7–52)
ANION GAP SERPL CALCULATED.3IONS-SCNC: 8 MMOL/L (ref 4–13)
AST SERPL W P-5'-P-CCNC: 20 U/L (ref 13–39)
BASOPHILS # BLD AUTO: 0.01 THOUSANDS/ÂΜL (ref 0–0.1)
BASOPHILS NFR BLD AUTO: 0 % (ref 0–1)
BILIRUB SERPL-MCNC: 0.58 MG/DL (ref 0.2–1)
BILIRUB UR QL STRIP: NEGATIVE
BUN SERPL-MCNC: 17 MG/DL (ref 5–25)
CALCIUM SERPL-MCNC: 8.8 MG/DL (ref 8.4–10.2)
CARDIAC TROPONIN I PNL SERPL HS: 4 NG/L
CARDIAC TROPONIN I PNL SERPL HS: 4 NG/L
CHLORIDE SERPL-SCNC: 102 MMOL/L (ref 96–108)
CLARITY UR: CLEAR
CO2 SERPL-SCNC: 26 MMOL/L (ref 21–32)
COLOR UR: YELLOW
CREAT SERPL-MCNC: 0.81 MG/DL (ref 0.6–1.3)
EOSINOPHIL # BLD AUTO: 0.03 THOUSAND/ÂΜL (ref 0–0.61)
EOSINOPHIL NFR BLD AUTO: 1 % (ref 0–6)
ERYTHROCYTE [DISTWIDTH] IN BLOOD BY AUTOMATED COUNT: 13.9 % (ref 11.6–15.1)
GFR SERPL CREATININE-BSD FRML MDRD: 95 ML/MIN/1.73SQ M
GLUCOSE SERPL-MCNC: 91 MG/DL (ref 65–140)
GLUCOSE UR STRIP-MCNC: NEGATIVE MG/DL
HCT VFR BLD AUTO: 39.6 % (ref 36.5–49.3)
HGB BLD-MCNC: 13.1 G/DL (ref 12–17)
HGB UR QL STRIP.AUTO: NEGATIVE
IMM GRANULOCYTES # BLD AUTO: 0.01 THOUSAND/UL (ref 0–0.2)
IMM GRANULOCYTES NFR BLD AUTO: 0 % (ref 0–2)
KETONES UR STRIP-MCNC: NEGATIVE MG/DL
LACTATE SERPL-SCNC: 0.7 MMOL/L (ref 0.5–2)
LEUKOCYTE ESTERASE UR QL STRIP: NEGATIVE
LYMPHOCYTES # BLD AUTO: 0.62 THOUSANDS/ÂΜL (ref 0.6–4.47)
LYMPHOCYTES NFR BLD AUTO: 26 % (ref 14–44)
MAGNESIUM SERPL-MCNC: 1.9 MG/DL (ref 1.9–2.7)
MCH RBC QN AUTO: 28.1 PG (ref 26.8–34.3)
MCHC RBC AUTO-ENTMCNC: 33.1 G/DL (ref 31.4–37.4)
MCV RBC AUTO: 85 FL (ref 82–98)
MONOCYTES # BLD AUTO: 0.4 THOUSAND/ÂΜL (ref 0.17–1.22)
MONOCYTES NFR BLD AUTO: 17 % (ref 4–12)
NEUTROPHILS # BLD AUTO: 1.29 THOUSANDS/ÂΜL (ref 1.85–7.62)
NEUTS SEG NFR BLD AUTO: 56 % (ref 43–75)
NITRITE UR QL STRIP: NEGATIVE
NRBC BLD AUTO-RTO: 0 /100 WBCS
PH UR STRIP.AUTO: 6 [PH]
PLATELET # BLD AUTO: 139 THOUSANDS/UL (ref 149–390)
PMV BLD AUTO: 12 FL (ref 8.9–12.7)
POTASSIUM SERPL-SCNC: 4.1 MMOL/L (ref 3.5–5.3)
PROT SERPL-MCNC: 7.5 G/DL (ref 6.4–8.4)
PROT UR STRIP-MCNC: NEGATIVE MG/DL
RBC # BLD AUTO: 4.66 MILLION/UL (ref 3.88–5.62)
SODIUM SERPL-SCNC: 136 MMOL/L (ref 135–147)
SP GR UR STRIP.AUTO: 1.01 (ref 1–1.03)
UROBILINOGEN UR QL STRIP.AUTO: 0.2 E.U./DL
WBC # BLD AUTO: 2.36 THOUSAND/UL (ref 4.31–10.16)

## 2024-05-02 PROCEDURE — 84484 ASSAY OF TROPONIN QUANT: CPT | Performed by: EMERGENCY MEDICINE

## 2024-05-02 PROCEDURE — 81003 URINALYSIS AUTO W/O SCOPE: CPT | Performed by: EMERGENCY MEDICINE

## 2024-05-02 PROCEDURE — 71045 X-RAY EXAM CHEST 1 VIEW: CPT

## 2024-05-02 PROCEDURE — 83735 ASSAY OF MAGNESIUM: CPT | Performed by: EMERGENCY MEDICINE

## 2024-05-02 PROCEDURE — 99285 EMERGENCY DEPT VISIT HI MDM: CPT

## 2024-05-02 PROCEDURE — 85025 COMPLETE CBC W/AUTO DIFF WBC: CPT | Performed by: EMERGENCY MEDICINE

## 2024-05-02 PROCEDURE — 80053 COMPREHEN METABOLIC PANEL: CPT | Performed by: EMERGENCY MEDICINE

## 2024-05-02 PROCEDURE — 99284 EMERGENCY DEPT VISIT MOD MDM: CPT | Performed by: EMERGENCY MEDICINE

## 2024-05-02 PROCEDURE — 83605 ASSAY OF LACTIC ACID: CPT | Performed by: EMERGENCY MEDICINE

## 2024-05-02 PROCEDURE — 93005 ELECTROCARDIOGRAM TRACING: CPT

## 2024-05-02 PROCEDURE — 36415 COLL VENOUS BLD VENIPUNCTURE: CPT | Performed by: EMERGENCY MEDICINE

## 2024-05-02 PROCEDURE — 71250 CT THORAX DX C-: CPT

## 2024-05-02 PROCEDURE — 74176 CT ABD & PELVIS W/O CONTRAST: CPT

## 2024-05-02 NOTE — ED PROVIDER NOTES
"History  Chief Complaint   Patient presents with    Pain With Breathing     Pt reports bilateral rib pain and LLQ \"stabbing like side stitches\" pain since 0300 this morning with an episode of sweating. Unrelieved with advil at home. Pain worsens with breathing and radiates into the front of his chest.      63-year-old male history of GERD presents with 1 day of sharp left-sided abdominal and chest pain radiating down to his left lower quadrant.  No shortness of breath.  Pain not worse with breathing.  Patient also reports occasional pain radiating to the right side of his chest.  No fever.  After onset of pain patient had an episode of sweating while sleeping.  No dysuria or hematuria.        Prior to Admission Medications   Prescriptions Last Dose Informant Patient Reported? Taking?   clobetasol (TEMOVATE) 0.05 % cream   No No   Sig: Apply topically 2 (two) times a day   omeprazole (PriLOSEC) 40 MG capsule   No No   Sig: Take 1 capsule (40 mg total) by mouth every morning      Facility-Administered Medications: None       Past Medical History:   Diagnosis Date    Lyme disease        History reviewed. No pertinent surgical history.    History reviewed. No pertinent family history.  I have reviewed and agree with the history as documented.    E-Cigarette/Vaping    E-Cigarette Use Never User      E-Cigarette/Vaping Substances     Social History     Tobacco Use    Smoking status: Never    Smokeless tobacco: Never   Vaping Use    Vaping status: Never Used   Substance Use Topics    Alcohol use: Not Currently    Drug use: Not Currently       Review of Systems   All other systems reviewed and are negative.      Physical Exam  Physical Exam  Constitutional:       General: He is not in acute distress.  HENT:      Nose: Nose normal.      Mouth/Throat:      Mouth: Mucous membranes are moist.   Cardiovascular:      Rate and Rhythm: Normal rate and regular rhythm.      Heart sounds: Normal heart sounds.   Pulmonary:      Effort: " Pulmonary effort is normal.      Breath sounds: Normal breath sounds.   Abdominal:      Palpations: Abdomen is soft.      Tenderness: There is no abdominal tenderness.   Musculoskeletal:         General: No swelling or deformity. Normal range of motion.   Skin:     General: Skin is warm and dry.   Neurological:      General: No focal deficit present.      Mental Status: He is alert and oriented to person, place, and time.   Psychiatric:         Mood and Affect: Mood normal.         Behavior: Behavior normal.         Vital Signs  ED Triage Vitals   Temperature Pulse Respirations Blood Pressure SpO2   05/02/24 1142 05/02/24 1140 05/02/24 1140 05/02/24 1140 05/02/24 1140   97.8 °F (36.6 °C) 68 16 133/81 99 %      Temp Source Heart Rate Source Patient Position - Orthostatic VS BP Location FiO2 (%)   05/02/24 1142 05/02/24 1140 05/02/24 1140 05/02/24 1140 --   Oral Monitor Sitting Left arm       Pain Score       05/02/24 1140       3           Vitals:    05/02/24 1140   BP: 133/81   Pulse: 68   Patient Position - Orthostatic VS: Sitting         Visual Acuity  Visual Acuity      Flowsheet Row Most Recent Value   L Pupil Size (mm) 3   R Pupil Size (mm) 3            ED Medications  Medications - No data to display    Diagnostic Studies  Results Reviewed       Procedure Component Value Units Date/Time    HS Troponin I 2hr [[id number]]  (Normal) Collected: 05/02/24 1508    Lab Status: Final result Specimen: Blood from Arm, Left Updated: 05/02/24 1534     hs TnI 2hr 4 ng/L      Delta 2hr hsTnI 0 ng/L     UA (URINE) with reflex to Scope [[id number]]  (Normal) Collected: 05/02/24 1418    Lab Status: Final result Specimen: Urine, Other Updated: 05/02/24 1450     Color, UA Yellow     Clarity, UA Clear     Specific Gravity, UA 1.010     pH, UA 6.0     Leukocytes, UA Negative     Nitrite, UA Negative     Protein, UA Negative mg/dl      Glucose, UA Negative mg/dl      Ketones, UA Negative mg/dl      Urobilinogen, UA 0.2 E.U./dl       Bilirubin, UA Negative     Occult Blood, UA Negative    HS Troponin 0hr (reflex protocol) [[id number]]  (Normal) Collected: 05/02/24 1207    Lab Status: Final result Specimen: Blood from Arm, Left Updated: 05/02/24 1234     hs TnI 0hr 4 ng/L     Lactic acid, plasma (w/reflex if result > 2.0) [[id number]]  (Normal) Collected: 05/02/24 1207    Lab Status: Final result Specimen: Blood from Arm, Left Updated: 05/02/24 1230     LACTIC ACID 0.7 mmol/L     Narrative:      Result may be elevated if tourniquet was used during collection.    Comprehensive metabolic panel [[id number]] Collected: 05/02/24 1207    Lab Status: Final result Specimen: Blood from Arm, Left Updated: 05/02/24 1229     Sodium 136 mmol/L      Potassium 4.1 mmol/L      Chloride 102 mmol/L      CO2 26 mmol/L      ANION GAP 8 mmol/L      BUN 17 mg/dL      Creatinine 0.81 mg/dL      Glucose 91 mg/dL      Calcium 8.8 mg/dL      AST 20 U/L      ALT 24 U/L      Alkaline Phosphatase 67 U/L      Total Protein 7.5 g/dL      Albumin 4.2 g/dL      Total Bilirubin 0.58 mg/dL      eGFR 95 ml/min/1.73sq m     Narrative:      National Kidney Disease Foundation guidelines for Chronic Kidney Disease (CKD):     Stage 1 with normal or high GFR (GFR > 90 mL/min/1.73 square meters)    Stage 2 Mild CKD (GFR = 60-89 mL/min/1.73 square meters)    Stage 3A Moderate CKD (GFR = 45-59 mL/min/1.73 square meters)    Stage 3B Moderate CKD (GFR = 30-44 mL/min/1.73 square meters)    Stage 4 Severe CKD (GFR = 15-29 mL/min/1.73 square meters)    Stage 5 End Stage CKD (GFR <15 mL/min/1.73 square meters)  Note: GFR calculation is accurate only with a steady state creatinine    Magnesium [[id number]]  (Normal) Collected: 05/02/24 1207    Lab Status: Final result Specimen: Blood from Arm, Left Updated: 05/02/24 1229     Magnesium 1.9 mg/dL     CBC and differential [[id number]]  (Abnormal) Collected: 05/02/24 1207    Lab Status: Final result Specimen: Blood from Arm, Left Updated: 05/02/24 6353      WBC 2.36 Thousand/uL      RBC 4.66 Million/uL      Hemoglobin 13.1 g/dL      Hematocrit 39.6 %      MCV 85 fL      MCH 28.1 pg      MCHC 33.1 g/dL      RDW 13.9 %      MPV 12.0 fL      Platelets 139 Thousands/uL      nRBC 0 /100 WBCs      Segmented % 56 %      Immature Grans % 0 %      Lymphocytes % 26 %      Monocytes % 17 %      Eosinophils Relative 1 %      Basophils Relative 0 %      Absolute Neutrophils 1.29 Thousands/µL      Absolute Immature Grans 0.01 Thousand/uL      Absolute Lymphocytes 0.62 Thousands/µL      Absolute Monocytes 0.40 Thousand/µL      Eosinophils Absolute 0.03 Thousand/µL      Basophils Absolute 0.01 Thousands/µL                    CT chest abdomen pelvis wo contrast   Final Result by Jerald Galicia MD (05/02 1443)      No acute findings in the chest, abdomen or pelvis within the limits of unenhanced technique.               Workstation performed: [id number]         XR chest 1 view portable    (Results Pending)              Procedures  Procedures         ED Course       63-year-old male presenting with left chest left-sided abdominal pain for 1 day.  Patient is well-appearing with normal vitals on arrival.  Soft nontender abdomen.  Clear lungs.  EKG shows normal sinus rhythm 67 bpm with no acute ST or T wave abnormalities per my independent interpretation.  Chest x-ray shows no acute disease per my independent interpretation.  Screening labs notable for CBC with nonspecific mildly decreased white blood cell count and platelet count of unclear significance.  Normal BMP.  Negative troponin x 2.  Negative lactate.  Normal urine.  CT scan shows no acute findings in the chest abdomen and pelvis.  Patient stable for discharge.                        SBIRT 20yo+      Flowsheet Row Most Recent Value   MARILIA: How many times in the past year have you...    Used an illegal drug or used a prescription medication for non-medical reasons? Never Filed at: 05/02/2024 1143                      Medical  Decision Making  Amount and/or Complexity of Data Reviewed  Labs: ordered.  Radiology: ordered.             Disposition  Final diagnoses:   Chest wall pain     Time reflects when diagnosis was documented in both MDM as applicable and the Disposition within this note       Time User Action Codes Description Comment    5/2/2024  3:55 PM Celsa Zuniga Add [R07.89] Chest wall pain           ED Disposition       ED Disposition   Discharge    Condition   Stable    Date/Time   Thu May 2, 2024 0214    Comment   Ramon Mora discharge to home/self care.                   Follow-up Information       Follow up With Specialties Details Why Contact Info    Herbert Garcia MD Family Medicine Schedule an appointment as soon as possible for a visit  As needed [address] [address]  [phone]              Discharge Medication List as of 5/2/2024  3:56 PM        CONTINUE these medications which have NOT CHANGED    Details   clobetasol (TEMOVATE) 0.05 % cream Apply topically 2 (two) times a day, Starting Fri 4/26/2024, Normal      omeprazole (PriLOSEC) 40 MG capsule Take 1 capsule (40 mg total) by mouth every morning, Starting Mon 10/23/2023, Normal             No discharge procedures on file.    PDMP Review       None            ED Provider  Electronically Signed by             Celsa Zuniga MD  05/02/24 4073

## 2024-05-02 NOTE — ED NOTES
D/c reviewed with pt. Pt verbalized understanding and has no further questions at this time.      Celsa Felipe RN  05/02/24 1243

## 2024-05-03 LAB
ATRIAL RATE: 67 BPM
P AXIS: 66 DEGREES
PR INTERVAL: 142 MS
QRS AXIS: 55 DEGREES
QRSD INTERVAL: 90 MS
QT INTERVAL: 446 MS
QTC INTERVAL: 471 MS
T WAVE AXIS: 65 DEGREES
VENTRICULAR RATE: 67 BPM

## 2024-05-03 PROCEDURE — 93010 ELECTROCARDIOGRAM REPORT: CPT | Performed by: INTERNAL MEDICINE

## 2024-05-04 ENCOUNTER — APPOINTMENT (OUTPATIENT)
Dept: LAB | Facility: HOSPITAL | Age: 64
End: 2024-05-04
Payer: COMMERCIAL

## 2024-05-04 DIAGNOSIS — Z79.899 OTHER LONG TERM (CURRENT) DRUG THERAPY: ICD-10-CM

## 2024-05-04 DIAGNOSIS — R73.03 PREDIABETES: ICD-10-CM

## 2024-05-04 DIAGNOSIS — E55.9 VITAMIN D DEFICIENCY: ICD-10-CM

## 2024-05-04 DIAGNOSIS — Z12.5 SCREENING FOR PROSTATE CANCER: ICD-10-CM

## 2024-05-04 DIAGNOSIS — E61.1 IRON DEFICIENCY: ICD-10-CM

## 2024-05-04 DIAGNOSIS — I10 PRIMARY HYPERTENSION: ICD-10-CM

## 2024-05-04 DIAGNOSIS — R00.2 PALPITATIONS: ICD-10-CM

## 2024-05-04 DIAGNOSIS — E78.2 MIXED HYPERLIPIDEMIA: ICD-10-CM

## 2024-05-04 LAB
25(OH)D3 SERPL-MCNC: 16.2 NG/ML (ref 30–100)
ALBUMIN SERPL BCP-MCNC: 4.2 G/DL (ref 3.5–5)
ALP SERPL-CCNC: 66 U/L (ref 34–104)
ALT SERPL W P-5'-P-CCNC: 23 U/L (ref 7–52)
ANION GAP SERPL CALCULATED.3IONS-SCNC: 8 MMOL/L (ref 4–13)
AST SERPL W P-5'-P-CCNC: 20 U/L (ref 13–39)
BASOPHILS # BLD MANUAL: 0.05 THOUSAND/UL (ref 0–0.1)
BASOPHILS NFR MAR MANUAL: 2 % (ref 0–1)
BILIRUB SERPL-MCNC: 0.42 MG/DL (ref 0.2–1)
BUN SERPL-MCNC: 21 MG/DL (ref 5–25)
CALCIUM SERPL-MCNC: 8.8 MG/DL (ref 8.4–10.2)
CHLORIDE SERPL-SCNC: 105 MMOL/L (ref 96–108)
CHOLEST SERPL-MCNC: 136 MG/DL
CO2 SERPL-SCNC: 27 MMOL/L (ref 21–32)
CREAT SERPL-MCNC: 0.81 MG/DL (ref 0.6–1.3)
EOSINOPHIL # BLD MANUAL: 0.05 THOUSAND/UL (ref 0–0.4)
EOSINOPHIL NFR BLD MANUAL: 2 % (ref 0–6)
ERYTHROCYTE [DISTWIDTH] IN BLOOD BY AUTOMATED COUNT: 13.9 % (ref 11.6–15.1)
EST. AVERAGE GLUCOSE BLD GHB EST-MCNC: 126 MG/DL
FERRITIN SERPL-MCNC: 378 NG/ML (ref 24–336)
GFR SERPL CREATININE-BSD FRML MDRD: 95 ML/MIN/1.73SQ M
GLUCOSE P FAST SERPL-MCNC: 100 MG/DL (ref 65–99)
HBA1C MFR BLD: 6 %
HCT VFR BLD AUTO: 41.2 % (ref 36.5–49.3)
HDLC SERPL-MCNC: 27 MG/DL
HGB BLD-MCNC: 13.3 G/DL (ref 12–17)
IRON SATN MFR SERPL: 56 % (ref 15–50)
IRON SERPL-MCNC: 197 UG/DL (ref 50–212)
LDLC SERPL CALC-MCNC: 90 MG/DL (ref 0–100)
LYMPHOCYTES # BLD AUTO: 0.9 THOUSAND/UL (ref 0.6–4.47)
LYMPHOCYTES # BLD AUTO: 31 % (ref 14–44)
MAGNESIUM SERPL-MCNC: 2 MG/DL (ref 1.9–2.7)
MCH RBC QN AUTO: 27.8 PG (ref 26.8–34.3)
MCHC RBC AUTO-ENTMCNC: 32.3 G/DL (ref 31.4–37.4)
MCV RBC AUTO: 86 FL (ref 82–98)
MONOCYTES # BLD AUTO: 0.44 THOUSAND/UL (ref 0–1.22)
MONOCYTES NFR BLD: 16 % (ref 4–12)
MYELOCYTE ABSOLUTE CT: 0.03 THOUSAND/UL (ref 0–0.1)
MYELOCYTES NFR BLD MANUAL: 1 % (ref 0–1)
NEUTROPHILS # BLD MANUAL: 1.25 THOUSAND/UL (ref 1.85–7.62)
NEUTS BAND NFR BLD MANUAL: 7 % (ref 0–8)
NEUTS SEG NFR BLD AUTO: 39 % (ref 43–75)
PLATELET # BLD AUTO: 151 THOUSANDS/UL (ref 149–390)
PLATELET BLD QL SMEAR: ADEQUATE
PMV BLD AUTO: 12.6 FL (ref 8.9–12.7)
POTASSIUM SERPL-SCNC: 4.2 MMOL/L (ref 3.5–5.3)
PROT SERPL-MCNC: 7.3 G/DL (ref 6.4–8.4)
PSA SERPL-MCNC: 0.5 NG/ML (ref 0–4)
RBC # BLD AUTO: 4.79 MILLION/UL (ref 3.88–5.62)
RBC MORPH BLD: NORMAL
SODIUM SERPL-SCNC: 140 MMOL/L (ref 135–147)
TIBC SERPL-MCNC: 354 UG/DL (ref 250–450)
TRIGL SERPL-MCNC: 94 MG/DL
TSH SERPL DL<=0.05 MIU/L-ACNC: 1.7 UIU/ML (ref 0.45–4.5)
UIBC SERPL-MCNC: 157 UG/DL (ref 155–355)
VARIANT LYMPHS # BLD AUTO: 2 %
WBC # BLD AUTO: 2.72 THOUSAND/UL (ref 4.31–10.16)

## 2024-05-04 PROCEDURE — 85007 BL SMEAR W/DIFF WBC COUNT: CPT | Performed by: FAMILY MEDICINE

## 2024-05-04 PROCEDURE — 80053 COMPREHEN METABOLIC PANEL: CPT | Performed by: FAMILY MEDICINE

## 2024-05-04 PROCEDURE — 82728 ASSAY OF FERRITIN: CPT | Performed by: FAMILY MEDICINE

## 2024-05-04 PROCEDURE — 84443 ASSAY THYROID STIM HORMONE: CPT | Performed by: FAMILY MEDICINE

## 2024-05-04 PROCEDURE — 83550 IRON BINDING TEST: CPT | Performed by: FAMILY MEDICINE

## 2024-05-04 PROCEDURE — 82306 VITAMIN D 25 HYDROXY: CPT | Performed by: FAMILY MEDICINE

## 2024-05-04 PROCEDURE — 80061 LIPID PANEL: CPT | Performed by: FAMILY MEDICINE

## 2024-05-04 PROCEDURE — 85027 COMPLETE CBC AUTOMATED: CPT | Performed by: FAMILY MEDICINE

## 2024-05-04 PROCEDURE — G0103 PSA SCREENING: HCPCS

## 2024-05-04 PROCEDURE — 83735 ASSAY OF MAGNESIUM: CPT | Performed by: FAMILY MEDICINE

## 2024-05-04 PROCEDURE — 36415 COLL VENOUS BLD VENIPUNCTURE: CPT | Performed by: FAMILY MEDICINE

## 2024-05-04 PROCEDURE — 83540 ASSAY OF IRON: CPT | Performed by: FAMILY MEDICINE

## 2024-05-04 PROCEDURE — 83036 HEMOGLOBIN GLYCOSYLATED A1C: CPT | Performed by: FAMILY MEDICINE

## 2024-05-06 ENCOUNTER — TELEPHONE (OUTPATIENT)
Dept: FAMILY MEDICINE CLINIC | Facility: CLINIC | Age: 64
End: 2024-05-06

## 2024-05-06 DIAGNOSIS — D70.9 NEUTROPENIA, UNSPECIFIED TYPE (HCC): Primary | ICD-10-CM

## 2024-05-06 DIAGNOSIS — Z20.09 EXPOSURE TO INTESTINAL INFECTIOUS DISEASE: Primary | ICD-10-CM

## 2024-05-06 NOTE — TELEPHONE ENCOUNTER
----- Message from Herbert Garcia MD sent at 5/6/2024  5:28 AM EDT -----  You are a PreDiabetic: watch the white rice, white bread, white Potato, and pasta. Maybe try the brown /whole wheat versions, or just limit how much and how often. Also be careful of the juices and sodas, and of course the sweets.     No change in the sugar number from 6 months ago    Please take vitamin D 5000 units twice a day or 2 at once for 3 months and then take once a day after that  Iron looks good  Ferritin is okay  The white blood cells are still low which is a little weird I mean to get you over to the hematologist for evaluation  Sometimes it is just a viral infection or a reactive change to some other process if you are sick  Kidneys liver look okay  Cholesterols look okay  Thyroid looks good sodium magnesium

## 2024-05-06 NOTE — TELEPHONE ENCOUNTER
----- Message from Herbert Garcia MD sent at 5/6/2024  5:30 AM EDT -----  Also when to send over the H. pylori stool test for you as your wife is positive to rule out that as a cause

## 2024-05-07 ENCOUNTER — TELEPHONE (OUTPATIENT)
Dept: HEMATOLOGY ONCOLOGY | Facility: CLINIC | Age: 64
End: 2024-05-07

## 2024-05-07 NOTE — TELEPHONE ENCOUNTER
I called Ramon in response to a referral that was received for patient to establish care with Hematology.     Outreach was made to schedule a consultation.    I left a voicemail explaining the reason for my call and advised patient to call [facility] at [phone].  Another attempt will be made to contact patient.

## 2024-05-08 ENCOUNTER — TELEPHONE (OUTPATIENT)
Dept: HEMATOLOGY ONCOLOGY | Facility: CLINIC | Age: 64
End: 2024-05-08

## 2024-05-08 NOTE — TELEPHONE ENCOUNTER
I called Ramon in response to a referral that was received for patient to establish care with Hematology.      Outreach was made to schedule a consultation.     I left a voicemail explaining the reason for my call and advised patient to call [facility] at [phone].  Another attempt will be made to contact patient.

## 2024-05-08 NOTE — TELEPHONE ENCOUNTER
Ramon called in response to a referral that was received for patient to establish care with Hematology.     Outreach was made to schedule a consultation.    A consultation was scheduled for patient during this call. Patient is scheduled on 5/14/24 at 3:00 PM with Monica Varghese at the [facility]

## 2024-05-11 ENCOUNTER — APPOINTMENT (OUTPATIENT)
Dept: LAB | Facility: HOSPITAL | Age: 64
End: 2024-05-11
Payer: COMMERCIAL

## 2024-05-11 DIAGNOSIS — E55.9 AVITAMINOSIS D: ICD-10-CM

## 2024-05-11 DIAGNOSIS — E78.2 MIXED HYPERLIPIDEMIA: ICD-10-CM

## 2024-05-11 DIAGNOSIS — R00.2 PALPITATIONS: ICD-10-CM

## 2024-05-11 DIAGNOSIS — Z20.09 EXPOSURE TO INTESTINAL INFECTIOUS DISEASE: ICD-10-CM

## 2024-05-11 DIAGNOSIS — R73.03 DIABETES MELLITUS, LATENT: ICD-10-CM

## 2024-05-11 DIAGNOSIS — Z79.899 ENCOUNTER FOR LONG-TERM (CURRENT) USE OF OTHER MEDICATIONS: ICD-10-CM

## 2024-05-11 DIAGNOSIS — E61.1 IRON DEFICIENCY: ICD-10-CM

## 2024-05-11 DIAGNOSIS — I15.9 SECONDARY HYPERTENSION: ICD-10-CM

## 2024-05-11 DIAGNOSIS — Z12.5 SPECIAL SCREENING FOR MALIGNANT NEOPLASM OF PROSTATE: ICD-10-CM

## 2024-05-11 LAB
BASOPHILS # BLD MANUAL: 0.05 THOUSAND/UL (ref 0–0.1)
BASOPHILS NFR MAR MANUAL: 1 % (ref 0–1)
EOSINOPHIL # BLD MANUAL: 0.15 THOUSAND/UL (ref 0–0.4)
EOSINOPHIL NFR BLD MANUAL: 3 % (ref 0–6)
ERYTHROCYTE [DISTWIDTH] IN BLOOD BY AUTOMATED COUNT: 13.6 % (ref 11.6–15.1)
FERRITIN SERPL-MCNC: 186 NG/ML (ref 24–336)
HCT VFR BLD AUTO: 39.6 % (ref 36.5–49.3)
HGB BLD-MCNC: 13.4 G/DL (ref 12–17)
LG PLATELETS BLD QL SMEAR: PRESENT
LYMPHOCYTES # BLD AUTO: 1.28 THOUSAND/UL (ref 0.6–4.47)
LYMPHOCYTES # BLD AUTO: 23 % (ref 14–44)
MCH RBC QN AUTO: 28.3 PG (ref 26.8–34.3)
MCHC RBC AUTO-ENTMCNC: 33.8 G/DL (ref 31.4–37.4)
MCV RBC AUTO: 84 FL (ref 82–98)
MONOCYTES # BLD AUTO: 0.41 THOUSAND/UL (ref 0–1.22)
MONOCYTES NFR BLD: 8 % (ref 4–12)
NEUTROPHILS # BLD MANUAL: 3.23 THOUSAND/UL (ref 1.85–7.62)
NEUTS BAND NFR BLD MANUAL: 3 % (ref 0–8)
NEUTS SEG NFR BLD AUTO: 60 % (ref 43–75)
PLATELET # BLD AUTO: 226 THOUSANDS/UL (ref 149–390)
PLATELET BLD QL SMEAR: ADEQUATE
PMV BLD AUTO: 11.8 FL (ref 8.9–12.7)
POLYCHROMASIA BLD QL SMEAR: PRESENT
RBC # BLD AUTO: 4.74 MILLION/UL (ref 3.88–5.62)
RBC MORPH BLD: PRESENT
TOTAL CELLS COUNTED SPEC: 100
VARIANT LYMPHS # BLD AUTO: 2 %
WBC # BLD AUTO: 5.12 THOUSAND/UL (ref 4.31–10.16)

## 2024-05-11 PROCEDURE — 85027 COMPLETE CBC AUTOMATED: CPT

## 2024-05-11 PROCEDURE — 85007 BL SMEAR W/DIFF WBC COUNT: CPT

## 2024-05-11 PROCEDURE — 83540 ASSAY OF IRON: CPT

## 2024-05-11 PROCEDURE — 83550 IRON BINDING TEST: CPT

## 2024-05-11 PROCEDURE — 82728 ASSAY OF FERRITIN: CPT

## 2024-05-11 PROCEDURE — 36415 COLL VENOUS BLD VENIPUNCTURE: CPT

## 2024-05-12 LAB
IRON SATN MFR SERPL: 13 % (ref 15–50)
IRON SERPL-MCNC: 48 UG/DL (ref 50–212)
TIBC SERPL-MCNC: 358 UG/DL (ref 250–450)
UIBC SERPL-MCNC: 310 UG/DL (ref 155–355)

## 2024-05-14 ENCOUNTER — OFFICE VISIT (OUTPATIENT)
Dept: HEMATOLOGY ONCOLOGY | Facility: CLINIC | Age: 64
End: 2024-05-14
Payer: COMMERCIAL

## 2024-05-14 VITALS
SYSTOLIC BLOOD PRESSURE: 108 MMHG | TEMPERATURE: 97.2 F | RESPIRATION RATE: 16 BRPM | HEART RATE: 75 BPM | OXYGEN SATURATION: 98 % | DIASTOLIC BLOOD PRESSURE: 68 MMHG | HEIGHT: 69 IN | WEIGHT: 199 LBS | BODY MASS INDEX: 29.47 KG/M2

## 2024-05-14 DIAGNOSIS — D70.9 NEUTROPENIA, UNSPECIFIED TYPE (HCC): ICD-10-CM

## 2024-05-14 PROCEDURE — 99203 OFFICE O/P NEW LOW 30 MIN: CPT | Performed by: PHYSICIAN ASSISTANT

## 2024-05-14 NOTE — PROGRESS NOTES
[address] [facility] [place]  [address] [facility] [address]  Hematology Ambulatory Consult  Ramon Mora, 1960, [id number]  5/14/2024      Assessment and Plan   1. Neutropenia, unspecified type (HCC)  Patient presents for evaluation of leukopenia.    Patient recently presented to the hospital with bilateral rib and stabbing left lower quadrant pain.  Workup was unable to find any abnormality to explain his symptoms.  He had negative CT chest, abdomen and pelvis, normal EKG, normal troponins.    He was noted to have low WBC count at that time at around 2.36 with elevated monocytes.  He was also noted to have a ferritin level of 378.    Lab work was repeated on 5/11/2024.  At that time WBC count returned to normal at 5.12, differential is normal other than 2% atypical lymphocytes.  Hemoglobin is normal.  Platelets are normal.  Ferritin has improved to 186.    We discussed that acute leukopenia can be secondary to a variety of issues including bacterial or viral infection or in the setting of inflammation (which would make sense with a ferritin level of nearly 400 at that time.)    Regardless, his lab work has returned to normal. Patient feel well. He has no B symptoms. No current complaints.    He needs no further hematologic work-up.    The patient does not need hematology follow-up.  CBC can be monitored by PCP.  Patient voiced agreement and understanding to the above.   Patient knows to call the Hematology/Oncology office with any questions and concerns regarding the above.    Barrier(s) to care: None.   The patient is  able to self care.    Subjective     Chief Complaint   Patient presents with    Consult     D70.9 (ICD-10-CM) - Neutropenia, unspecified type (HCC)       Referring provider    Herbert Garcia MD  [address] [address]    History of present illness:   Patient presents for evaluation of neutropenia.    He is  primarily [place]-speaking. His wife helped to interpret.    Patient presented to the ED on 5/2/2024 for evaluation of bilateral rib pain and left lower quadrant stabbing pain.  He had normal EKG and chest x-ray.  He had negative troponins.  CT scan showed no acute findings in the chest, abdomen or pelvis.  The patient was discharged. His pain has not recurred since his hospital stay.    He had lab work completed in the ED.  WBC count was 2.36, monocytes were elevated at 17%, differential was otherwise okay, hemoglobin was normal and platelets were 139.    Lab work was repeated on 5/4/2024.  At that time WBC count was 2.72, 16% monocytes, 2% atypical lymphocytes.  Hemoglobin and platelets were normal.  His ferritin was elevated at 378 at that time as well.    Lab work was repeated again on 5/11/2024.  At that time WBC count returned to normal at 5.12. Differential is normal other than 2% atypical lymphocytes.  Hemoglobin is normal.  Platelets are normal.  Ferritin has improved to 186.    Patient feels well currently.  He is up-to-date with his age-appropriate screenings.  He denies chest pain, shortness of breath, nausea, vomiting, bowel changes.  He denies unexplained weight loss.  No unexplained fevers, drenching night sweats, or palpable adenopathy.    Review of Systems   Constitutional:  Negative for activity change, appetite change, fatigue and fever.   HENT:  Negative for nosebleeds.    Respiratory:  Negative for cough, choking and shortness of breath.    Cardiovascular:  Negative for chest pain, palpitations and leg swelling.   Gastrointestinal:  Negative for abdominal distention, abdominal pain, anal bleeding, blood in stool, constipation, diarrhea, nausea and vomiting.   Endocrine: Negative for cold intolerance.   Genitourinary:  Negative for hematuria.   Musculoskeletal:  Negative for myalgias.   Skin:  Negative for color change, pallor and rash.   Allergic/Immunologic: Negative for immunocompromised  "state.   Neurological:  Negative for headaches.   Hematological:  Negative for adenopathy. Does not bruise/bleed easily.   All other systems reviewed and are negative.      Past Medical History:   Diagnosis Date    Lyme disease      History reviewed. No pertinent surgical history.  Family History   Problem Relation Age of Onset    Lymphoma Mother     Melanoma Father     Prostate cancer Maternal Grandfather      Social History     Socioeconomic History    Marital status: /Civil Union     Spouse name: None    Number of children: None    Years of education: None    Highest education level: None   Occupational History    None   Tobacco Use    Smoking status: Former     Types: Cigarettes     Start date:      Quit date: 1975     Years since quittin.4     Passive exposure: Past    Smokeless tobacco: Never   Vaping Use    Vaping status: Never Used   Substance and Sexual Activity    Alcohol use: Not Currently    Drug use: Not Currently    Sexual activity: None   Other Topics Concern    None   Social History Narrative    None     Social Determinants of Health     Financial Resource Strain: Not on file   Food Insecurity: Not on file   Transportation Needs: Not on file   Physical Activity: Not on file   Stress: Not on file   Social Connections: Not on file   Intimate Partner Violence: Not on file   Housing Stability: Not on file       Current Outpatient Medications:     clobetasol (TEMOVATE) 0.05 % cream, Apply topically 2 (two) times a day, Disp: 60 g, Rfl: 0    omeprazole (PriLOSEC) 40 MG capsule, Take 1 capsule (40 mg total) by mouth every morning, Disp: 90 capsule, Rfl: 1  No Known Allergies    Objective   /68 (BP Location: Left arm, Patient Position: Sitting, Cuff Size: Standard)   Pulse 75   Temp (!) 97.2 °F (36.2 °C) (Temporal)   Resp 16   Ht 5' 9\" (1.753 m)   Wt 90.3 kg (199 lb)   SpO2 98%   BMI 29.39 kg/m²   Physical Exam  Constitutional:       General: He is not in acute distress.     " Appearance: He is well-developed and normal weight.   HENT:      Head: Normocephalic and atraumatic.      Right Ear: External ear normal.      Left Ear: External ear normal.      Nose: Nose normal.   Eyes:      General: No scleral icterus.     Conjunctiva/sclera: Conjunctivae normal.   Cardiovascular:      Rate and Rhythm: Normal rate and regular rhythm.   Pulmonary:      Effort: Pulmonary effort is normal. No respiratory distress.      Breath sounds: Normal breath sounds.   Abdominal:      General: Abdomen is flat. There is no distension.      Palpations: Abdomen is soft.   Skin:     Findings: No rash (on exposed skin.).   Neurological:      Mental Status: He is alert and oriented to person, place, and time.   Psychiatric:         Mood and Affect: Mood normal.         Thought Content: Thought content normal.         Judgment: Judgment normal.     Extremities No lower extremity edema bilaterally.  Lymph: No cervical, occipital, supraclavicular, or axillary adenopathy.    Result Review  Labs:   Latest Reference Range & Units 05/02/24 12:07 05/04/24 08:01 05/11/24 07:08   WBC 4.31 - 10.16 Thousand/uL 2.36 (L) 2.72 (L) 5.12   RBC 3.88 - 5.62 Million/uL 4.66 4.79 4.74   Hemoglobin 12.0 - 17.0 g/dL 13.1 13.3 13.4   Hematocrit 36.5 - 49.3 % 39.6 41.2 39.6   MCV 82 - 98 fL 85 86 84   MCH 26.8 - 34.3 pg 28.1 27.8 28.3   MCHC 31.4 - 37.4 g/dL 33.1 32.3 33.8   RDW 11.6 - 15.1 % 13.9 13.9 13.6   Platelet Count 149 - 390 Thousands/uL 139 (L) 151 226   MPV 8.9 - 12.7 fL 12.0 12.6 11.8   Platelet Estimate Adequate   Adequate Adequate   nRBC /100 WBCs 0     Segmented % 43 - 75 % 56 39 (L) 60   Bands % 0 - 8 %  7 3   Lymphocytes % 14 - 44 % 26 31 23   Monocytes % 4 - 12 % 17 (H) 16 (H) 8   Eosinophils % 0 - 6 % 1 2 3   Basophils % 0 - 1 % 0 2 (H) 1   Immature Grans % 0 - 2 % 0     Myelocytes % 0 - 1 %  1    Atypical Lymphocytes % <=0 %  2 (H) 2 (H)   Absolute Immature Grans 0.00 - 0.20 Thousand/uL 0.01     Absolute Neutrophils 1.85  - 7.62 Thousand/uL 1.29 (L) 1.25 (L) 3.23   Absolute Lymphocytes 0.60 - 4.47 Thousand/uL 0.62 0.90 1.28   Absolute Monocytes 0.00 - 1.22 Thousand/uL 0.40 0.44 0.41   Absolute Eosinophils 0.00 - 0.40 Thousand/uL 0.03 0.05 0.15   Absolute Basophils 0.00 - 0.10 Thousand/uL 0.01 0.05 0.05   Absolute Myelocytes 0.00 - 0.10 Thousand/uL  0.03    (L): Data is abnormally low  (H): Data is abnormally high    Imagin2024 CT CHEST, ABDOMEN AND PELVIS WITHOUT IV CONTRAST   IMPRESSION:     No acute findings in the chest, abdomen or pelvis within the limits of unenhanced technique.    Please note:  This report has been generated by a voice recognition software system. Therefore there may be syntax, spelling, and/or grammatical errors. Please call if you have any questions.

## 2024-05-31 ENCOUNTER — TELEPHONE (OUTPATIENT)
Age: 64
End: 2024-05-31

## 2024-05-31 ENCOUNTER — HOSPITAL ENCOUNTER (OUTPATIENT)
Dept: NON INVASIVE DIAGNOSTICS | Facility: HOSPITAL | Age: 64
Discharge: HOME | End: 2024-05-31
Payer: COMMERCIAL

## 2024-05-31 VITALS
BODY MASS INDEX: 29.47 KG/M2 | WEIGHT: 199 LBS | HEIGHT: 69 IN | SYSTOLIC BLOOD PRESSURE: 152 MMHG | DIASTOLIC BLOOD PRESSURE: 82 MMHG | OXYGEN SATURATION: 98 % | HEART RATE: 78 BPM | RESPIRATION RATE: 16 BRPM

## 2024-05-31 VITALS
WEIGHT: 199 LBS | BODY MASS INDEX: 29.47 KG/M2 | HEIGHT: 69 IN | SYSTOLIC BLOOD PRESSURE: 108 MMHG | DIASTOLIC BLOOD PRESSURE: 68 MMHG | HEART RATE: 75 BPM

## 2024-05-31 DIAGNOSIS — R94.39 ABNORMAL STRESS TEST: Primary | ICD-10-CM

## 2024-05-31 DIAGNOSIS — R01.1 MURMUR, CARDIAC: ICD-10-CM

## 2024-05-31 DIAGNOSIS — R07.89 ATYPICAL CHEST PAIN: ICD-10-CM

## 2024-05-31 DIAGNOSIS — I10 PRIMARY HYPERTENSION: ICD-10-CM

## 2024-05-31 LAB
AORTIC ROOT: 3.1 CM
APICAL FOUR CHAMBER EJECTION FRACTION: 50 %
ASCENDING AORTA: 3.4 CM
AV LVOT MEAN GRADIENT: 3 MMHG
AV LVOT PEAK GRADIENT: 4 MMHG
AV REGURGITATION PRESSURE HALF TIME: 542 MS
BSA FOR ECHO PROCEDURE: 2.06 M2
CHEST PAIN STATEMENT: NORMAL
DOP CALC LVOT PEAK VEL VTI: 29.69 CM
DOP CALC LVOT PEAK VEL: 1.03 M/S
E WAVE DECELERATION TIME: 255 MS
E/A RATIO: 0.95
FRACTIONAL SHORTENING: 30 (ref 28–44)
INTERVENTRICULAR SEPTUM IN DIASTOLE (PARASTERNAL SHORT AXIS VIEW): 1.3 CM
INTERVENTRICULAR SEPTUM: 1.3 CM (ref 0.6–1.1)
LAAS-AP2: 25.3 CM2
LAAS-AP4: 20.2 CM2
LEFT ATRIUM SIZE: 4 CM
LEFT ATRIUM VOLUME (MOD BIPLANE): 70 ML
LEFT ATRIUM VOLUME INDEX (MOD BIPLANE): 35 ML/M2
LEFT INTERNAL DIMENSION IN SYSTOLE: 3.5 CM (ref 2.1–4)
LEFT VENTRICULAR INTERNAL DIMENSION IN DIASTOLE: 5 CM (ref 3.5–6)
LEFT VENTRICULAR POSTERIOR WALL IN END DIASTOLE: 1.1 CM
LEFT VENTRICULAR STROKE VOLUME: 64 ML
LVSV (TEICH): 64 ML
MAX DIASTOLIC BP: 100 MMHG
MAX HR PERCENT: 80 %
MAX HR: 118 BPM
MAX PREDICTED HEART RATE: 158 BPM
MV E'TISSUE VEL-SEP: 8 CM/S
MV PEAK A VEL: 1.29 M/S
MV PEAK E VEL: 123 CM/S
MV STENOSIS PRESSURE HALF TIME: 74 MS
MV VALVE AREA P 1/2 METHOD: 2.97
PROTOCOL NAME: NORMAL
RATE PRESSURE PRODUCT: NORMAL
REASON FOR TERMINATION: NORMAL
RIGHT ATRIUM AREA SYSTOLE A4C: 20.7 CM2
RIGHT VENTRICLE ID DIMENSION: 4 CM
SL CV AV DECELERATION TIME RETROGRADE: 1869 MS
SL CV AV PEAK GRADIENT RETROGRADE: 69 MMHG
SL CV LEFT ATRIUM LENGTH A2C: 6.1 CM
SL CV LV EF: 50
SL CV LV EF: 50
SL CV PED ECHO LEFT VENTRICLE DIASTOLIC VOLUME (MOD BIPLANE) 2D: 116 ML
SL CV PED ECHO LEFT VENTRICLE SYSTOLIC VOLUME (MOD BIPLANE) 2D: 52 ML
SL CV STRESS RECOVERY BP: 150 MMHG
SL CV STRESS RECOVERY HR: 65 BPM
SL CV STRESS RECOVERY O2 SAT: 99 %
STRESS ANGINA INDEX: 2
STRESS BASELINE BP: NORMAL MMHG
STRESS BASELINE HR: 78 BPM
STRESS DUKE TREADMILL SCORE: -19
STRESS O2 SAT REST: 98 %
STRESS PEAK HR: 118 BPM
STRESS POST ESTIMATED WORKLOAD: 5.8 METS
STRESS POST EXERCISE DUR MIN: 4 MIN
STRESS POST EXERCISE DUR MIN: 4 MIN
STRESS POST EXERCISE DUR SEC: 5 SEC
STRESS POST EXERCISE DUR SEC: 5 SEC
STRESS POST O2 SAT PEAK: 99 %
STRESS POST PEAK BP: 170 MMHG
STRESS POST PEAK HR: 127 BPM
STRESS POST PEAK SYSTOLIC BP: 180 MMHG
STRESS ST DEPRESSION: 3 MM
TARGET HR FORMULA: NORMAL
TEST INDICATION: NORMAL
TR MAX PG: 25 MMHG
TR PEAK VELOCITY: 2.5 M/S
TRICUSPID ANNULAR PLANE SYSTOLIC EXCURSION: 3 CM
TRICUSPID VALVE PEAK REGURGITATION VELOCITY: 2.48 M/S

## 2024-05-31 PROCEDURE — 93306 TTE W/DOPPLER COMPLETE: CPT

## 2024-05-31 PROCEDURE — 93350 STRESS TTE ONLY: CPT | Performed by: INTERNAL MEDICINE

## 2024-05-31 PROCEDURE — 93350 STRESS TTE ONLY: CPT

## 2024-05-31 PROCEDURE — 93306 TTE W/DOPPLER COMPLETE: CPT | Performed by: INTERNAL MEDICINE

## 2024-05-31 NOTE — TELEPHONE ENCOUNTER
Shea (wife) called would like to know if echo stress test can be reviewed as soon as possible was told it is not good and needs cardiac catherization. Both are worried and would like to hear from Dr Garcia. Please call Shea at [phone]   Is This A New Presentation, Or A Follow-Up?: Skin Lesions Additional History: Patient has a white spot for a freezing at a previous dermatologist. Patient also has a spot on his right cheek that’s a little discolored.

## 2024-06-05 ENCOUNTER — CONSULT (OUTPATIENT)
Dept: CARDIOLOGY CLINIC | Facility: CLINIC | Age: 64
End: 2024-06-05
Payer: COMMERCIAL

## 2024-06-05 ENCOUNTER — PREP FOR PROCEDURE (OUTPATIENT)
Dept: CARDIOLOGY CLINIC | Facility: CLINIC | Age: 64
End: 2024-06-05

## 2024-06-05 VITALS
SYSTOLIC BLOOD PRESSURE: 158 MMHG | WEIGHT: 195 LBS | HEART RATE: 70 BPM | HEIGHT: 69 IN | BODY MASS INDEX: 28.88 KG/M2 | OXYGEN SATURATION: 96 % | TEMPERATURE: 97.9 F | DIASTOLIC BLOOD PRESSURE: 84 MMHG

## 2024-06-05 DIAGNOSIS — R94.39 ABNORMAL STRESS TEST: ICD-10-CM

## 2024-06-05 DIAGNOSIS — R07.9 CHEST PAIN, UNSPECIFIED TYPE: Primary | ICD-10-CM

## 2024-06-05 DIAGNOSIS — R73.01 IFG (IMPAIRED FASTING GLUCOSE): ICD-10-CM

## 2024-06-05 PROCEDURE — 99204 OFFICE O/P NEW MOD 45 MIN: CPT | Performed by: INTERNAL MEDICINE

## 2024-06-05 RX ORDER — ASPIRIN 81 MG/1
81 TABLET ORAL DAILY
Start: 2024-06-05

## 2024-06-05 RX ORDER — METOPROLOL SUCCINATE 25 MG/1
25 TABLET, EXTENDED RELEASE ORAL DAILY
Qty: 90 TABLET | Refills: 3 | Status: SHIPPED | OUTPATIENT
Start: 2024-06-05

## 2024-06-05 RX ORDER — ROSUVASTATIN CALCIUM 20 MG/1
20 TABLET, COATED ORAL DAILY
Qty: 90 TABLET | Refills: 3 | Status: SHIPPED | OUTPATIENT
Start: 2024-06-05

## 2024-06-05 NOTE — PROGRESS NOTES
"                                           Cardiology Consultation     Ramon Mora  [id number]  1960  [facility] [place] [address] [facility] [address] [phone]      1. Chest pain, unspecified type  metoprolol succinate (TOPROL-XL) 25 mg 24 hr tablet    aspirin (ECOTRIN LOW STRENGTH) 81 mg EC tablet    rosuvastatin (CRESTOR) 20 MG tablet      2. Abnormal stress test  metoprolol succinate (TOPROL-XL) 25 mg 24 hr tablet    aspirin (ECOTRIN LOW STRENGTH) 81 mg EC tablet    rosuvastatin (CRESTOR) 20 MG tablet      3. IFG (impaired fasting glucose)            Discussion/Summary:    Symptoms of typical angina. Significantly abnormal stress echo. Coronary artery calcification in the LAD.  Discussed in detail with patient and his wife.  Arrange for cardiac cath ideally next week. Discussed ER precautions.    Start 81mg aspirin.  Start metoprolol succinate 25mg daily for HTN, antianginal.  Start 20mg Crestor.    Short term follow up with AP.  Will arrange this to be done at [facility].      History of Present Illness:  63 year old man. He has no formal or prior diagnosis of HTN, but BP has been running high.    He works as a  for a school. He has been experiencing symptoms of \"heat\" in his throat when he is exerting himself. He was referred by Dr. Garcia, his PCP for a stress echo which he had done last Friday. He exercised a little over 4 minutes with deep ST depressions on the ECG, reproduction of symptoms in his throat, there was evidence of anterolateral WMA post exercise.    He has not had any major rest symptoms.    No bleeding problems, no contraindication to antiplatelets.    Quit smoking several years ago, but was smoking 1 ppd for a while.  No strong FHx in first degree family members.    He had a CT scan done in May which we reviewed he has presence of coronary artery calcification, particularly in the LAD.      Patient Active Problem List " "  Diagnosis    Family history of Alzheimer's disease     Past Medical History:   Diagnosis Date    Lyme disease      Social History     Tobacco Use    Smoking status: Former     Types: Cigarettes     Start date:      Quit date: 1975     Years since quittin.4     Passive exposure: Past    Smokeless tobacco: Never   Vaping Use    Vaping status: Never Used   Substance Use Topics    Alcohol use: Not Currently    Drug use: Not Currently      Family History   Problem Relation Age of Onset    Lymphoma Mother     Melanoma Father     Prostate cancer Maternal Grandfather      History reviewed. No pertinent surgical history.    Current Outpatient Medications:     aspirin (ECOTRIN LOW STRENGTH) 81 mg EC tablet, Take 1 tablet (81 mg total) by mouth daily, Disp: , Rfl:     metoprolol succinate (TOPROL-XL) 25 mg 24 hr tablet, Take 1 tablet (25 mg total) by mouth daily, Disp: 90 tablet, Rfl: 3    rosuvastatin (CRESTOR) 20 MG tablet, Take 1 tablet (20 mg total) by mouth daily, Disp: 90 tablet, Rfl: 3    clobetasol (TEMOVATE) 0.05 % cream, Apply topically 2 (two) times a day (Patient not taking: Reported on 2024), Disp: 60 g, Rfl: 0    omeprazole (PriLOSEC) 40 MG capsule, Take 1 capsule (40 mg total) by mouth every morning (Patient not taking: Reported on 2024), Disp: 90 capsule, Rfl: 1  No Known Allergies    Vitals:    24 1600   BP: 158/84   BP Location: Left arm   Patient Position: Sitting   Cuff Size: Standard   Pulse: 70   Temp: 97.9 °F (36.6 °C)   SpO2: 96%   Weight: 88.5 kg (195 lb)   Height: 5' 9\" (1.753 m)     Vitals:    24 1600   Weight: 88.5 kg (195 lb)      Height: 5' 9\" (175.3 cm)   Body mass index is 28.8 kg/m².    Physical Exam:  GENERAL: Alert, well appearing, and in no distress  HEENT:  PERRL, EOMI, no scleral icterus, no conjunctival pallor  NECK:  Supple, No elevated JVP, no thyromegaly, no carotid bruits  HEART:  Regular rate and rhythm, normal S1/S2, no S3/S4, no murmur or rub  LUNGS:  " "Clear to auscultation bilaterally  ABDOMEN:  Soft, non-tender, positive bowel sounds, no rebound or guarding  EXTREMITIES:  No edema  VASCULAR:  Normal pedal pulses   NEURO: No focal deficits,  SKIN: Normal without suspicious lesions on exposed skin      ROS:  Positive for tightness in throat with exertion.  Except as noted in HPI, is otherwise reviewed in detail and a 12 point review of systems is negative.    Labs:  Lab Results   Component Value Date    SODIUM 140 05/04/2024    K 4.2 05/04/2024     05/04/2024    CREATININE 0.81 05/04/2024    BUN 21 05/04/2024    CO2 27 05/04/2024    ALT 23 05/04/2024    AST 20 05/04/2024    GLUF 100 (H) 05/04/2024    HGBA1C 6.0 (H) 05/04/2024    WBC 5.12 05/11/2024    HGB 13.4 05/11/2024    HCT 39.6 05/11/2024     05/11/2024       No results found for: \"CHOL\"  Lab Results   Component Value Date    HDL 27 (L) 05/04/2024    HDL 39 (L) 05/06/2023    HDL 40 02/19/2022     Lab Results   Component Value Date    LDLCALC 90 05/04/2024    LDLCALC 118 (H) 05/06/2023    LDLCALC 150 (H) 02/19/2022     Lab Results   Component Value Date    TRIG 94 05/04/2024    TRIG 125 05/06/2023    TRIG 138.0 02/19/2022       Testing:  Stress Echo 5/31/24:  Stress ECG: Overall, the patient's exercise capacity was moderately impaired for their age. The patient after exercising for 4 min and 5 sec and had a maximal HR of 118 bpm (80% of MPHR) and 5.8 METS. The patient experienced exercise-limiting angina during the test.    Stress ECG: Horizontal ST depression of 2.5-3.0 mm in leads II, III and aVF noted.  In addition 1.5 mm ST depressions noted in leads V3-V6.  ST depressions were noted at low workload and persisted about 3 minutes into recovery. The stress ECG is consistent with ischemia after maximal exercise, with reproduction of symptoms.    Peak Stress Echo: Left ventricle cavity has normal reduction in size at peak stress. The peak stress echo showed new anterolateral wall motion " abnormalities compared to baseline.    Echo Post Impression: The study is consistent with ischemia, after maximal exercise.     Markedly abnormal exercise treadmill stress echo as noted above.  Clinically abnormal for limiting angina.  Electrocardiographic and echocardiographic evidence of ischemia at low workload.  Further ischemic evaluation should be considered with cardiac catheterization if clinically indicated.  ER precautions reviewed with the patient.

## 2024-06-05 NOTE — H&P (VIEW-ONLY)
"                                           Cardiology Consultation     Ramon Mora  [id number]  1960  [facility] [place] [id number] [facility] [address] [phone]      1. Chest pain, unspecified type  metoprolol succinate (TOPROL-XL) 25 mg 24 hr tablet    aspirin (ECOTRIN LOW STRENGTH) 81 mg EC tablet    rosuvastatin (CRESTOR) 20 MG tablet      2. Abnormal stress test  metoprolol succinate (TOPROL-XL) 25 mg 24 hr tablet    aspirin (ECOTRIN LOW STRENGTH) 81 mg EC tablet    rosuvastatin (CRESTOR) 20 MG tablet      3. IFG (impaired fasting glucose)            Discussion/Summary:    Symptoms of typical angina. Significantly abnormal stress echo. Coronary artery calcification in the LAD.  Discussed in detail with patient and his wife.  Arrange for cardiac cath ideally next week. Discussed ER precautions.    Start 81mg aspirin.  Start metoprolol succinate 25mg daily for HTN, antianginal.  Start 20mg Crestor.    Short term follow up with AP.  Will arrange this to be done at [facility].      History of Present Illness:  63 year old man. He has no formal or prior diagnosis of HTN, but BP has been running high.    He works as a  for a school. He has been experiencing symptoms of \"heat\" in his throat when he is exerting himself. He was referred by Dr. Garcia, his PCP for a stress echo which he had done last Friday. He exercised a little over 4 minutes with deep ST depressions on the ECG, reproduction of symptoms in his throat, there was evidence of anterolateral WMA post exercise.    He has not had any major rest symptoms.    No bleeding problems, no contraindication to antiplatelets.    Quit smoking several years ago, but was smoking 1 ppd for a while.  No strong FHx in first degree family members.    He had a CT scan done in May which we reviewed he has presence of coronary artery calcification, particularly in the LAD.      Patient Active Problem List " "  Diagnosis    Family history of Alzheimer's disease     Past Medical History:   Diagnosis Date    Lyme disease      Social History     Tobacco Use    Smoking status: Former     Types: Cigarettes     Start date:      Quit date: 1975     Years since quittin.4     Passive exposure: Past    Smokeless tobacco: Never   Vaping Use    Vaping status: Never Used   Substance Use Topics    Alcohol use: Not Currently    Drug use: Not Currently      Family History   Problem Relation Age of Onset    Lymphoma Mother     Melanoma Father     Prostate cancer Maternal Grandfather      History reviewed. No pertinent surgical history.    Current Outpatient Medications:     aspirin (ECOTRIN LOW STRENGTH) 81 mg EC tablet, Take 1 tablet (81 mg total) by mouth daily, Disp: , Rfl:     metoprolol succinate (TOPROL-XL) 25 mg 24 hr tablet, Take 1 tablet (25 mg total) by mouth daily, Disp: 90 tablet, Rfl: 3    rosuvastatin (CRESTOR) 20 MG tablet, Take 1 tablet (20 mg total) by mouth daily, Disp: 90 tablet, Rfl: 3    clobetasol (TEMOVATE) 0.05 % cream, Apply topically 2 (two) times a day (Patient not taking: Reported on 2024), Disp: 60 g, Rfl: 0    omeprazole (PriLOSEC) 40 MG capsule, Take 1 capsule (40 mg total) by mouth every morning (Patient not taking: Reported on 2024), Disp: 90 capsule, Rfl: 1  No Known Allergies    Vitals:    24 1600   BP: 158/84   BP Location: Left arm   Patient Position: Sitting   Cuff Size: Standard   Pulse: 70   Temp: 97.9 °F (36.6 °C)   SpO2: 96%   Weight: 88.5 kg (195 lb)   Height: 5' 9\" (1.753 m)     Vitals:    24 1600   Weight: 88.5 kg (195 lb)      Height: 5' 9\" (175.3 cm)   Body mass index is 28.8 kg/m².    Physical Exam:  GENERAL: Alert, well appearing, and in no distress  HEENT:  PERRL, EOMI, no scleral icterus, no conjunctival pallor  NECK:  Supple, No elevated JVP, no thyromegaly, no carotid bruits  HEART:  Regular rate and rhythm, normal S1/S2, no S3/S4, no murmur or rub  LUNGS:  " "Clear to auscultation bilaterally  ABDOMEN:  Soft, non-tender, positive bowel sounds, no rebound or guarding  EXTREMITIES:  No edema  VASCULAR:  Normal pedal pulses   NEURO: No focal deficits,  SKIN: Normal without suspicious lesions on exposed skin      ROS:  Positive for tightness in throat with exertion.  Except as noted in HPI, is otherwise reviewed in detail and a 12 point review of systems is negative.    Labs:  Lab Results   Component Value Date    SODIUM 140 05/04/2024    K 4.2 05/04/2024     05/04/2024    CREATININE 0.81 05/04/2024    BUN 21 05/04/2024    CO2 27 05/04/2024    ALT 23 05/04/2024    AST 20 05/04/2024    GLUF 100 (H) 05/04/2024    HGBA1C 6.0 (H) 05/04/2024    WBC 5.12 05/11/2024    HGB 13.4 05/11/2024    HCT 39.6 05/11/2024     05/11/2024       No results found for: \"CHOL\"  Lab Results   Component Value Date    HDL 27 (L) 05/04/2024    HDL 39 (L) 05/06/2023    HDL 40 02/19/2022     Lab Results   Component Value Date    LDLCALC 90 05/04/2024    LDLCALC 118 (H) 05/06/2023    LDLCALC 150 (H) 02/19/2022     Lab Results   Component Value Date    TRIG 94 05/04/2024    TRIG 125 05/06/2023    TRIG 138.0 02/19/2022       Testing:  Stress Echo 5/31/24:  Stress ECG: Overall, the patient's exercise capacity was moderately impaired for their age. The patient after exercising for 4 min and 5 sec and had a maximal HR of 118 bpm (80% of MPHR) and 5.8 METS. The patient experienced exercise-limiting angina during the test.    Stress ECG: Horizontal ST depression of 2.5-3.0 mm in leads II, III and aVF noted.  In addition 1.5 mm ST depressions noted in leads V3-V6.  ST depressions were noted at low workload and persisted about 3 minutes into recovery. The stress ECG is consistent with ischemia after maximal exercise, with reproduction of symptoms.    Peak Stress Echo: Left ventricle cavity has normal reduction in size at peak stress. The peak stress echo showed new anterolateral wall motion " abnormalities compared to baseline.    Echo Post Impression: The study is consistent with ischemia, after maximal exercise.     Markedly abnormal exercise treadmill stress echo as noted above.  Clinically abnormal for limiting angina.  Electrocardiographic and echocardiographic evidence of ischemia at low workload.  Further ischemic evaluation should be considered with cardiac catheterization if clinically indicated.  ER precautions reviewed with the patient.

## 2024-06-06 ENCOUNTER — TELEPHONE (OUTPATIENT)
Dept: CARDIOLOGY CLINIC | Facility: CLINIC | Age: 64
End: 2024-06-06

## 2024-06-07 ENCOUNTER — TELEPHONE (OUTPATIENT)
Age: 64
End: 2024-06-07

## 2024-06-07 DIAGNOSIS — R94.39 ABNORMAL STRESS TEST: ICD-10-CM

## 2024-06-07 DIAGNOSIS — R07.9 CHEST PAIN, UNSPECIFIED TYPE: Primary | ICD-10-CM

## 2024-06-07 NOTE — TELEPHONE ENCOUNTER
"Patient scheduled for Left Heart Cath on 6/13/24 at [facility] with .      Instructions sent to patient through [facility].      Patient's wife Shea aware of all general instructions. Shea wants to schedule asap at any campus.    Medication holds:   N/A    Labs to be done on 6/10/24:  CMP / CBC (fasting 8 hours)           Thank you,  Alessandra \"Taya\" Dillon      "

## 2024-06-07 NOTE — TELEPHONE ENCOUNTER
Caller: Patient's wife (Shea)    Doctor: Dr Umanzor    Reason for call: Patient's wife is returning a call to Meri Bird to schedule procedure for cardiac catheterization. Message was left for her  yesterday. Please call her, as she would like to schedule an appointment as soon as possible.     Call back#: [phone]

## 2024-06-09 ENCOUNTER — APPOINTMENT (OUTPATIENT)
Dept: LAB | Facility: CLINIC | Age: 64
End: 2024-06-09
Payer: COMMERCIAL

## 2024-06-09 LAB
ALBUMIN SERPL BCP-MCNC: 4.3 G/DL (ref 3.5–5)
ALP SERPL-CCNC: 69 U/L (ref 34–104)
ALT SERPL W P-5'-P-CCNC: 16 U/L (ref 7–52)
ANION GAP SERPL CALCULATED.3IONS-SCNC: 6 MMOL/L (ref 4–13)
AST SERPL W P-5'-P-CCNC: 14 U/L (ref 13–39)
BASOPHILS # BLD AUTO: 0.04 THOUSANDS/ÂΜL (ref 0–0.1)
BASOPHILS NFR BLD AUTO: 1 % (ref 0–1)
BILIRUB SERPL-MCNC: 0.63 MG/DL (ref 0.2–1)
BUN SERPL-MCNC: 23 MG/DL (ref 5–25)
CALCIUM SERPL-MCNC: 9.1 MG/DL (ref 8.4–10.2)
CHLORIDE SERPL-SCNC: 104 MMOL/L (ref 96–108)
CO2 SERPL-SCNC: 27 MMOL/L (ref 21–32)
CREAT SERPL-MCNC: 0.8 MG/DL (ref 0.6–1.3)
EOSINOPHIL # BLD AUTO: 0.1 THOUSAND/ÂΜL (ref 0–0.61)
EOSINOPHIL NFR BLD AUTO: 2 % (ref 0–6)
ERYTHROCYTE [DISTWIDTH] IN BLOOD BY AUTOMATED COUNT: 14.4 % (ref 11.6–15.1)
GFR SERPL CREATININE-BSD FRML MDRD: 95 ML/MIN/1.73SQ M
GLUCOSE P FAST SERPL-MCNC: 106 MG/DL (ref 65–99)
HCT VFR BLD AUTO: 42.4 % (ref 36.5–49.3)
HGB BLD-MCNC: 13.9 G/DL (ref 12–17)
IMM GRANULOCYTES # BLD AUTO: 0.01 THOUSAND/UL (ref 0–0.2)
IMM GRANULOCYTES NFR BLD AUTO: 0 % (ref 0–2)
LYMPHOCYTES # BLD AUTO: 1.33 THOUSANDS/ÂΜL (ref 0.6–4.47)
LYMPHOCYTES NFR BLD AUTO: 25 % (ref 14–44)
MCH RBC QN AUTO: 28.4 PG (ref 26.8–34.3)
MCHC RBC AUTO-ENTMCNC: 32.8 G/DL (ref 31.4–37.4)
MCV RBC AUTO: 87 FL (ref 82–98)
MONOCYTES # BLD AUTO: 0.4 THOUSAND/ÂΜL (ref 0.17–1.22)
MONOCYTES NFR BLD AUTO: 8 % (ref 4–12)
NEUTROPHILS # BLD AUTO: 3.47 THOUSANDS/ÂΜL (ref 1.85–7.62)
NEUTS SEG NFR BLD AUTO: 64 % (ref 43–75)
NRBC BLD AUTO-RTO: 0 /100 WBCS
PLATELET # BLD AUTO: 204 THOUSANDS/UL (ref 149–390)
PMV BLD AUTO: 12.5 FL (ref 8.9–12.7)
POTASSIUM SERPL-SCNC: 4.5 MMOL/L (ref 3.5–5.3)
PROT SERPL-MCNC: 7.5 G/DL (ref 6.4–8.4)
RBC # BLD AUTO: 4.9 MILLION/UL (ref 3.88–5.62)
SODIUM SERPL-SCNC: 137 MMOL/L (ref 135–147)
WBC # BLD AUTO: 5.35 THOUSAND/UL (ref 4.31–10.16)

## 2024-06-09 PROCEDURE — 36415 COLL VENOUS BLD VENIPUNCTURE: CPT

## 2024-06-09 PROCEDURE — 85025 COMPLETE CBC W/AUTO DIFF WBC: CPT

## 2024-06-09 PROCEDURE — 80053 COMPREHEN METABOLIC PANEL: CPT

## 2024-06-13 ENCOUNTER — HOSPITAL ENCOUNTER (OUTPATIENT)
Facility: HOSPITAL | Age: 64
Setting detail: OUTPATIENT SURGERY
Discharge: HOME | End: 2024-06-14
Attending: INTERNAL MEDICINE | Admitting: INTERNAL MEDICINE
Payer: COMMERCIAL

## 2024-06-13 DIAGNOSIS — R07.9 CHEST PAIN, UNSPECIFIED TYPE: ICD-10-CM

## 2024-06-13 DIAGNOSIS — R94.39 ABNORMAL STRESS TEST: ICD-10-CM

## 2024-06-13 DIAGNOSIS — Z95.820 S/P ANGIOPLASTY WITH STENT: Primary | ICD-10-CM

## 2024-06-13 PROBLEM — I25.118 CORONARY ARTERY DISEASE OF NATIVE ARTERY OF NATIVE HEART WITH STABLE ANGINA PECTORIS (HCC): Status: ACTIVE | Noted: 2024-06-13

## 2024-06-13 LAB
ANION GAP SERPL CALCULATED.3IONS-SCNC: 6 MMOL/L (ref 4–13)
ATRIAL RATE: 58 BPM
BUN SERPL-MCNC: 26 MG/DL (ref 5–25)
CALCIUM SERPL-MCNC: 9.1 MG/DL (ref 8.4–10.2)
CHLORIDE SERPL-SCNC: 106 MMOL/L (ref 96–108)
CO2 SERPL-SCNC: 25 MMOL/L (ref 21–32)
CREAT SERPL-MCNC: 0.77 MG/DL (ref 0.6–1.3)
GFR SERPL CREATININE-BSD FRML MDRD: 97 ML/MIN/1.73SQ M
GLUCOSE P FAST SERPL-MCNC: 95 MG/DL (ref 65–99)
GLUCOSE SERPL-MCNC: 95 MG/DL (ref 65–140)
KCT BLD-ACNC: 235 SEC (ref 89–137)
KCT BLD-ACNC: 301 SEC (ref 89–137)
P AXIS: 64 DEGREES
POTASSIUM SERPL-SCNC: 4.4 MMOL/L (ref 3.5–5.3)
PR INTERVAL: 152 MS
QRS AXIS: 22 DEGREES
QRSD INTERVAL: 92 MS
QT INTERVAL: 464 MS
QTC INTERVAL: 455 MS
SODIUM SERPL-SCNC: 137 MMOL/L (ref 135–147)
SPECIMEN SOURCE: ABNORMAL
SPECIMEN SOURCE: ABNORMAL
T WAVE AXIS: 48 DEGREES
VENTRICULAR RATE: 58 BPM

## 2024-06-13 PROCEDURE — C1769 GUIDE WIRE: HCPCS | Performed by: INTERNAL MEDICINE

## 2024-06-13 PROCEDURE — 93799 UNLISTED CV SVC/PROCEDURE: CPT | Performed by: INTERNAL MEDICINE

## 2024-06-13 PROCEDURE — C1894 INTRO/SHEATH, NON-LASER: HCPCS | Performed by: INTERNAL MEDICINE

## 2024-06-13 PROCEDURE — 93458 L HRT ARTERY/VENTRICLE ANGIO: CPT | Performed by: INTERNAL MEDICINE

## 2024-06-13 PROCEDURE — 93010 ELECTROCARDIOGRAM REPORT: CPT | Performed by: INTERNAL MEDICINE

## 2024-06-13 PROCEDURE — 99152 MOD SED SAME PHYS/QHP 5/>YRS: CPT | Performed by: INTERNAL MEDICINE

## 2024-06-13 PROCEDURE — C1725 CATH, TRANSLUMIN NON-LASER: HCPCS | Performed by: INTERNAL MEDICINE

## 2024-06-13 PROCEDURE — 85347 COAGULATION TIME ACTIVATED: CPT

## 2024-06-13 PROCEDURE — C1874 STENT, COATED/COV W/DEL SYS: HCPCS | Performed by: INTERNAL MEDICINE

## 2024-06-13 PROCEDURE — 93571 IV DOP VEL&/PRESS C FLO 1ST: CPT | Performed by: INTERNAL MEDICINE

## 2024-06-13 PROCEDURE — C1887 CATHETER, GUIDING: HCPCS | Performed by: INTERNAL MEDICINE

## 2024-06-13 PROCEDURE — 99153 MOD SED SAME PHYS/QHP EA: CPT | Performed by: INTERNAL MEDICINE

## 2024-06-13 PROCEDURE — C9600 PERC DRUG-EL COR STENT SING: HCPCS | Performed by: INTERNAL MEDICINE

## 2024-06-13 PROCEDURE — 92928 PRQ TCAT PLMT NTRAC ST 1 LES: CPT | Performed by: INTERNAL MEDICINE

## 2024-06-13 PROCEDURE — 93005 ELECTROCARDIOGRAM TRACING: CPT

## 2024-06-13 PROCEDURE — 80048 BASIC METABOLIC PNL TOTAL CA: CPT | Performed by: INTERNAL MEDICINE

## 2024-06-13 DEVICE — STENT ONYXNG27522UX ONYX 2.75X22RX
Type: IMPLANTABLE DEVICE | Site: CORONARY | Status: FUNCTIONAL
Brand: ONYX FRONTIER™

## 2024-06-13 RX ORDER — METOPROLOL SUCCINATE 25 MG/1
25 TABLET, EXTENDED RELEASE ORAL DAILY
Status: DISCONTINUED | OUTPATIENT
Start: 2024-06-14 | End: 2024-06-14 | Stop reason: HOSPADM

## 2024-06-13 RX ORDER — NITROGLYCERIN 20 MG/100ML
INJECTION INTRAVENOUS CODE/TRAUMA/SEDATION MEDICATION
Status: DISCONTINUED | OUTPATIENT
Start: 2024-06-13 | End: 2024-06-13 | Stop reason: HOSPADM

## 2024-06-13 RX ORDER — ONDANSETRON 2 MG/ML
4 INJECTION INTRAMUSCULAR; INTRAVENOUS EVERY 6 HOURS PRN
Status: DISCONTINUED | OUTPATIENT
Start: 2024-06-13 | End: 2024-06-14 | Stop reason: HOSPADM

## 2024-06-13 RX ORDER — PANTOPRAZOLE SODIUM 40 MG/1
40 TABLET, DELAYED RELEASE ORAL DAILY
Qty: 30 TABLET | Refills: 2 | Status: SHIPPED | OUTPATIENT
Start: 2024-06-13

## 2024-06-13 RX ORDER — LABETALOL HYDROCHLORIDE 5 MG/ML
10 INJECTION, SOLUTION INTRAVENOUS
Status: DISCONTINUED | OUTPATIENT
Start: 2024-06-13 | End: 2024-06-14 | Stop reason: HOSPADM

## 2024-06-13 RX ORDER — SODIUM CHLORIDE 9 MG/ML
50 INJECTION, SOLUTION INTRAVENOUS CONTINUOUS
Status: DISPENSED | OUTPATIENT
Start: 2024-06-13 | End: 2024-06-13

## 2024-06-13 RX ORDER — HEPARIN SODIUM 1000 [USP'U]/ML
INJECTION, SOLUTION INTRAVENOUS; SUBCUTANEOUS CODE/TRAUMA/SEDATION MEDICATION
Status: DISCONTINUED | OUTPATIENT
Start: 2024-06-13 | End: 2024-06-13 | Stop reason: HOSPADM

## 2024-06-13 RX ORDER — ASPIRIN 81 MG/1
81 TABLET ORAL DAILY
Qty: 30 TABLET | Refills: 12 | Status: SHIPPED | OUTPATIENT
Start: 2024-06-13

## 2024-06-13 RX ORDER — CLOPIDOGREL BISULFATE 75 MG/1
75 TABLET ORAL DAILY
Qty: 30 TABLET | Refills: 12 | Status: SHIPPED | OUTPATIENT
Start: 2024-06-14

## 2024-06-13 RX ORDER — ATORVASTATIN CALCIUM 80 MG/1
80 TABLET, FILM COATED ORAL
Status: DISCONTINUED | OUTPATIENT
Start: 2024-06-13 | End: 2024-06-14 | Stop reason: HOSPADM

## 2024-06-13 RX ORDER — SODIUM CHLORIDE 9 MG/ML
125 INJECTION, SOLUTION INTRAVENOUS CONTINUOUS
Status: DISCONTINUED | OUTPATIENT
Start: 2024-06-13 | End: 2024-06-13

## 2024-06-13 RX ORDER — CLOPIDOGREL BISULFATE 75 MG/1
TABLET ORAL CODE/TRAUMA/SEDATION MEDICATION
Status: DISCONTINUED | OUTPATIENT
Start: 2024-06-13 | End: 2024-06-13 | Stop reason: HOSPADM

## 2024-06-13 RX ORDER — ACETAMINOPHEN 325 MG/1
650 TABLET ORAL EVERY 6 HOURS PRN
Status: DISCONTINUED | OUTPATIENT
Start: 2024-06-13 | End: 2024-06-14 | Stop reason: HOSPADM

## 2024-06-13 RX ORDER — CLOPIDOGREL BISULFATE 75 MG/1
75 TABLET ORAL DAILY
Status: DISCONTINUED | OUTPATIENT
Start: 2024-06-14 | End: 2024-06-14 | Stop reason: HOSPADM

## 2024-06-13 RX ORDER — PANTOPRAZOLE SODIUM 40 MG/1
40 TABLET, DELAYED RELEASE ORAL
Status: DISCONTINUED | OUTPATIENT
Start: 2024-06-14 | End: 2024-06-14 | Stop reason: HOSPADM

## 2024-06-13 RX ORDER — ASPIRIN 81 MG/1
324 TABLET, CHEWABLE ORAL ONCE
Status: COMPLETED | OUTPATIENT
Start: 2024-06-13 | End: 2024-06-13

## 2024-06-13 RX ORDER — MIDAZOLAM HYDROCHLORIDE 2 MG/2ML
INJECTION, SOLUTION INTRAMUSCULAR; INTRAVENOUS CODE/TRAUMA/SEDATION MEDICATION
Status: DISCONTINUED | OUTPATIENT
Start: 2024-06-13 | End: 2024-06-13 | Stop reason: HOSPADM

## 2024-06-13 RX ORDER — ROSUVASTATIN CALCIUM 20 MG/1
20 TABLET, COATED ORAL DAILY
Qty: 90 TABLET | Refills: 0 | Status: SHIPPED | OUTPATIENT
Start: 2024-06-13

## 2024-06-13 RX ORDER — ROSUVASTATIN CALCIUM 20 MG/1
40 TABLET, COATED ORAL DAILY
Qty: 90 TABLET | Refills: 0 | Status: SHIPPED | OUTPATIENT
Start: 2024-06-13 | End: 2024-06-13

## 2024-06-13 RX ORDER — FENTANYL CITRATE 50 UG/ML
INJECTION, SOLUTION INTRAMUSCULAR; INTRAVENOUS CODE/TRAUMA/SEDATION MEDICATION
Status: DISCONTINUED | OUTPATIENT
Start: 2024-06-13 | End: 2024-06-13 | Stop reason: HOSPADM

## 2024-06-13 RX ORDER — VERAPAMIL HYDROCHLORIDE 2.5 MG/ML
INJECTION, SOLUTION INTRAVENOUS CODE/TRAUMA/SEDATION MEDICATION
Status: DISCONTINUED | OUTPATIENT
Start: 2024-06-13 | End: 2024-06-13 | Stop reason: HOSPADM

## 2024-06-13 RX ADMIN — ATORVASTATIN CALCIUM 80 MG: 80 TABLET, FILM COATED ORAL at 16:11

## 2024-06-13 RX ADMIN — SODIUM CHLORIDE 50 ML/HR: 0.9 INJECTION, SOLUTION INTRAVENOUS at 12:34

## 2024-06-13 RX ADMIN — ACETAMINOPHEN 650 MG: 325 TABLET, FILM COATED ORAL at 22:13

## 2024-06-13 RX ADMIN — ASPIRIN 81 MG CHEWABLE TABLET 324 MG: 81 TABLET CHEWABLE at 08:48

## 2024-06-13 RX ADMIN — SODIUM CHLORIDE 125 ML/HR: 0.9 INJECTION, SOLUTION INTRAVENOUS at 09:06

## 2024-06-13 RX ADMIN — SODIUM CHLORIDE 125 ML/HR: 0.9 INJECTION, SOLUTION INTRAVENOUS at 10:26

## 2024-06-13 NOTE — ASSESSMENT & PLAN NOTE
Had OP cath for typical angina.  6/13/2024 S/P successful PCI with CATALINA FRONTIER 2.75MM X 22MM DAVIAN x 1 (12 rafa) to a Mid Cx lesion.  DAPT with ASA 81 mg QD & Plavix 75 mg QD thru at least 12/13/2024 if not 6/13/2025.  DASH diet  Ambulatory referral for cardiac rehab was placed.  No omeprazole on dc. There can be interaction with Plavix. I wrote for protonix instead.  Continue home BB  Increase home Crestor from 20 mg to 40 mg QD

## 2024-06-13 NOTE — PLAN OF CARE
Problem: PAIN - ADULT  Goal: Verbalizes/displays adequate comfort level or baseline comfort level  Description: Interventions:  - Encourage patient to monitor pain and request assistance  - Assess pain using appropriate pain scale  - Administer analgesics based on type and severity of pain and evaluate response  - Implement non-pharmacological measures as appropriate and evaluate response  - Consider cultural and social influences on pain and pain management  - Notify physician/advanced practitioner if interventions unsuccessful or patient reports new pain  Outcome: Progressing     Problem: INFECTION - ADULT  Goal: Absence or prevention of progression during hospitalization  Description: INTERVENTIONS:  - Assess and monitor for signs and symptoms of infection  - Monitor lab/diagnostic results  - Monitor all insertion sites, i.e. indwelling lines, tubes, and drains  - Monitor endotracheal if appropriate and nasal secretions for changes in amount and color  - [place] appropriate cooling/warming therapies per order  - Administer medications as ordered  - Instruct and encourage patient and family to use good hand hygiene technique  - Identify and instruct in appropriate isolation precautions for identified infection/condition  Outcome: Progressing  Goal: Absence of fever/infection during neutropenic period  Description: INTERVENTIONS:  - Monitor WBC    Outcome: Progressing     Problem: DISCHARGE PLANNING  Goal: Discharge to home or other facility with appropriate resources  Description: INTERVENTIONS:  - Identify barriers to discharge w/patient and caregiver  - Arrange for needed discharge resources and transportation as appropriate  - Identify discharge learning needs (meds, wound care, etc.)  - Arrange for interpretive services to assist at discharge as needed  - Refer to Case Management Department for coordinating discharge planning if the patient needs post-hospital services based on physician/advanced  practitioner order or complex needs related to functional status, cognitive ability, or social support system  Outcome: Progressing     Problem: Knowledge Deficit  Goal: Patient/family/caregiver demonstrates understanding of disease process, treatment plan, medications, and discharge instructions  Description: Complete learning assessment and assess knowledge base.  Interventions:  - Provide teaching at level of understanding  - Provide teaching via preferred learning methods  Outcome: Progressing     Problem: NEUROSENSORY - ADULT  Goal: Achieves stable or improved neurological status  Description: INTERVENTIONS  - Monitor and report changes in neurological status  - Monitor vital signs such as temperature, blood pressure, glucose, and any other labs ordered   - Initiate measures to prevent increased intracranial pressure  - Monitor for seizure activity and implement precautions if appropriate      Outcome: Progressing  Goal: Remains free of injury related to seizures activity  Description: INTERVENTIONS  - Maintain airway, patient safety  and administer oxygen as ordered  - Monitor patient for seizure activity, document and report duration and description of seizure to physician/advanced practitioner  - If seizure occurs,  ensure patient safety during seizure  - Reorient patient post seizure  - Seizure pads on all 4 side rails  - Instruct patient/family to notify RN of any seizure activity including if an aura is experienced  - Instruct patient/family to call for assistance with activity based on nursing assessment  - Administer anti-seizure medications if ordered    Outcome: Progressing  Goal: Achieves maximal functionality and self care  Description: INTERVENTIONS  - Monitor swallowing and airway patency with patient fatigue and changes in neurological status  - Encourage and assist patient to increase activity and self care.   - Encourage visually impaired, hearing impaired and aphasic patients to use  assistive/communication devices  Outcome: Progressing     Problem: CARDIOVASCULAR - ADULT  Goal: Maintains optimal cardiac output and hemodynamic stability  Description: INTERVENTIONS:  - Monitor I/O, vital signs and rhythm  - Monitor for S/S and trends of decreased cardiac output  - Administer and titrate ordered vasoactive medications to optimize hemodynamic stability  - Assess quality of pulses, skin color and temperature  - Assess for signs of decreased coronary artery perfusion  - Instruct patient to report change in severity of symptoms  Outcome: Progressing  Goal: Absence of cardiac dysrhythmias or at baseline rhythm  Description: INTERVENTIONS:  - Continuous cardiac monitoring, vital signs, obtain 12 lead EKG if ordered  - Administer antiarrhythmic and heart rate control medications as ordered  - Monitor electrolytes and administer replacement therapy as ordered  Outcome: Progressing     Problem: RESPIRATORY - ADULT  Goal: Achieves optimal ventilation and oxygenation  Description: INTERVENTIONS:  - Assess for changes in respiratory status  - Assess for changes in mentation and behavior  - Position to facilitate oxygenation and minimize respiratory effort  - Oxygen administered by appropriate delivery if ordered  - Initiate smoking cessation education as indicated  - Encourage broncho-pulmonary hygiene including cough, deep breathe, Incentive Spirometry  - Assess the need for suctioning and aspirate as needed  - Assess and instruct to report SOB or any respiratory difficulty  - Respiratory Therapy support as indicated  Outcome: Progressing     Problem: GASTROINTESTINAL - ADULT  Goal: Minimal or absence of nausea and/or vomiting  Description: INTERVENTIONS:  - Administer IV fluids if ordered to ensure adequate hydration  - Maintain NPO status until nausea and vomiting are resolved  - Nasogastric tube if ordered  - Administer ordered antiemetic medications as needed  - Provide nonpharmacologic comfort measures  as appropriate  - Advance diet as tolerated, if ordered  - Consider nutrition services referral to assist patient with adequate nutrition and appropriate food choices  Outcome: Progressing  Goal: Maintains or returns to baseline bowel function  Description: INTERVENTIONS:  - Assess bowel function  - Encourage oral fluids to ensure adequate hydration  - Administer IV fluids if ordered to ensure adequate hydration  - Administer ordered medications as needed  - Encourage mobilization and activity  - Consider nutritional services referral to assist patient with adequate nutrition and appropriate food choices  Outcome: Progressing  Goal: Maintains adequate nutritional intake  Description: INTERVENTIONS:  - Monitor percentage of each meal consumed  - Identify factors contributing to decreased intake, treat as appropriate  - Assist with meals as needed  - Monitor I&O, weight, and lab values if indicated  - Obtain nutrition services referral as needed  Outcome: Progressing  Goal: Establish and maintain optimal ostomy function  Description: INTERVENTIONS:  - Assess bowel function  - Encourage oral fluids to ensure adequate hydration  - Administer IV fluids if ordered to ensure adequate hydration   - Administer ordered medications as needed  - Encourage mobilization and activity  - Nutrition services referral to assist patient with appropriate food choices  - Assess stoma site  - Consider wound care consult   Outcome: Progressing  Goal: Oral mucous membranes remain intact  Description: INTERVENTIONS  - Assess oral mucosa and hygiene practices  - Implement preventative oral hygiene regimen  - Implement oral medicated treatments as ordered  - Initiate Nutrition services referral as needed  Outcome: Progressing     Problem: GENITOURINARY - ADULT  Goal: Maintains or returns to baseline urinary function  Description: INTERVENTIONS:  - Assess urinary function  - Encourage oral fluids to ensure adequate hydration if ordered  -  Administer IV fluids as ordered to ensure adequate hydration  - Administer ordered medications as needed  - Offer frequent toileting  - Follow urinary retention protocol if ordered  Outcome: Progressing  Goal: Absence of urinary retention  Description: INTERVENTIONS:  - Assess patient’s ability to void and empty bladder  - Monitor I/O  - Bladder scan as needed  - Discuss with physician/AP medications to alleviate retention as needed  - Discuss catheterization for long term situations as appropriate  Outcome: Progressing  Goal: Urinary catheter remains patent  Description: INTERVENTIONS:  - Assess patency of urinary catheter  - If patient has a chronic garsia, consider changing catheter if non-functioning  - Follow guidelines for intermittent irrigation of non-functioning urinary catheter  Outcome: Progressing     Problem: METABOLIC, FLUID AND ELECTROLYTES - ADULT  Goal: Electrolytes maintained within normal limits  Description: INTERVENTIONS:  - Monitor labs and assess patient for signs and symptoms of electrolyte imbalances  - Administer electrolyte replacement as ordered  - Monitor response to electrolyte replacements, including repeat lab results as appropriate  - Instruct patient on fluid and nutrition as appropriate  Outcome: Progressing  Goal: Fluid balance maintained  Description: INTERVENTIONS:  - Monitor labs   - Monitor I/O and WT  - Instruct patient on fluid and nutrition as appropriate  - Assess for signs & symptoms of volume excess or deficit  Outcome: Progressing  Goal: Glucose maintained within target range  Description: INTERVENTIONS:  - Monitor Blood Glucose as ordered  - Assess for signs and symptoms of hyperglycemia and hypoglycemia  - Administer ordered medications to maintain glucose within target range  - Assess nutritional intake and initiate nutrition service referral as needed  Outcome: Progressing     Problem: HEMATOLOGIC - ADULT  Goal: Maintains hematologic stability  Description:  INTERVENTIONS  - Assess for signs and symptoms of bleeding or hemorrhage  - Monitor labs  - Administer supportive blood products/factors as ordered and appropriate  Outcome: Progressing

## 2024-06-13 NOTE — LETTER
formerly [facility] 4  [address] [place] [address]  Dept: [phone]    June 14, 2024     Patient: Ramon Mora   YOB: 1960   Date of Visit: 6/13/2024       To Whom it May Concern:    Ramon Mora is under my professional care. He was seen in the hospital from 6/13/2024 to 06/14/24. He may return to work on 7/8/2024 without limitations.    If you have any questions or concerns, please don't hesitate to call.         Sincerely,          Elyssa Ponce PA-C

## 2024-06-13 NOTE — DISCHARGE INSTR - AVS FIRST PAGE
1. No heavy lifting, greater than 10 lbs. or strenuous activity for 1 week (thru 6/20/2024)    2. Remove band aid tomorrow (6/15/2024).  Shower and wash area- wrist gently with soap and water- beginning tomorrow (6/15/2024). Rinse and pat dry.  Apply new water seal band aid.  Repeat this process for 5 days (thru 6/19/2024). No powders, creams lotions or antibiotic ointments  for 5 days.  No tub baths, hot tubs or swimming for 5 days.     3. Call [facility] Cardiology Office ([phone]) if you develop a fever, redness or drainage at your wrist access site.    4. Can go back to driving on 6/15/2024.    5. Do not stop aspirin or Plavix (clopiogrel) any reason without a cardiologist’s consent, or the stent could block up and cause a heart attack.    6. Keep the stent card and book in a safe place.    ___________________________________________________________________________________________________________    [facility] Cardiac Rehabilitation Program  You have received a referral to [facility] Cardiac Rehabilitation Program.  While you may wait on a call from the office, please free free to call the office at [phone] for questions or to schedule your evaluation.    Cardiac Rehabilitation services are available at the following locations:    [facility]  [address]  [place] [address]  [facility]  [address] [address]  [facility]  [address] [place] [address]  [address] [address]  [facility]  [address]  [place] [address]  [facility]   [address], [address]  [place] [address]  [facility]  [address] [place] [address]    You may also read more about the Cardiac Rehabilitation Program at  [email or web address].

## 2024-06-13 NOTE — DISCHARGE SUMMARY
[facility]  Discharge- Ramon Mora 1960, 63 y.o. male MRN: [id number]  Unit/Bed#: E4 -01 Encounter: [id number]  Primary Care Provider: Herbert Garcia MD   Date and time admitted to hospital: 6/13/2024  8:27 AM    * S/P angioplasty with stent  Assessment & Plan  Had OP cath for typical angina.  6/13/2024 S/P successful PCI with CATALINA FRONTIER 2.75MM X 22MM DAVIAN x 1 (12 rafa) to a Mid Cx lesion.  DAPT with ASA 81 mg QD & Plavix 75 mg QD thru at least 12/13/2024 if not 6/13/2025.  DASH diet  Ambulatory referral for cardiac rehab was placed.  No omeprazole on dc. There can be interaction with Plavix. I wrote for protonix instead.  Continue home BB  Add Norvasc 5 mg QD  Continue home Crestor 20 mg QD.    Admission Date: 6/13/2024    Discharge Date: 6/14/2024    Disposition: Home    Condition at Discharge: good     PCP: Dr. Herbert Garcia     OP Cardiologist: Dr. Noé Delcid    Interventional cardiologist: Dr. Fang Haddad    Admitting Diagnosis:Chest pain    Secondary Diagnoses: Abnormal stress test    Discharge Diagnosis:CAD s/p angioplasty & stent    Consultants: None    Procedures: [facility] 6/13/2024     HPI and Hospital Course: Patient referred for outpatient elective cardiac catheterization by outpatient cardiologist due to symptoms of typical angina and significantly abnormal stress test.  Underwent cardiac catheterization showing mild diffuse disease in the LAD.  Mild diffuse disease in the left circumflex with mid circumflex culprit lesion.  Moderate diffuse disease in the RCA with proximal RCA lesion of 45%.  Underwent PCI and DAVIAN x 1 to the left circumflex lesion with 0% residual stenosis.  Post cath recovery was uncomplicated.  He was admitted for a no charge outpatient recovery bed.  Telemetry overnight showed 1 3-beat run of NSVT. Some SB with HR 44 BPM yesterday afternoon. Otherwise HR 60 BPM. BP was elevated and he has mild chest pressure this morning will add Norvasc  5 mg QD. He will have dual antiplatelet therapy with aspirin and Plavix.  His statin will be continued.  He was referred to cardiac rehab.  His follow-up appointment at the cardiology office is with JU Berry on 6/25/2024.  Patient was provided with post cath care instructions.  Patient and his family have had all their questions answered and they are amenable to his discharge to home today.    Review of Systems   Constitutional: Negative for malaise/fatigue.   Cardiovascular:  Negative for dyspnea on exertion, irregular heartbeat, leg swelling, near-syncope, orthopnea, palpitations, paroxysmal nocturnal dyspnea and syncope.        + chest pressure   Respiratory:  Negative for shortness of breath and sleep disturbances due to breathing.    Musculoskeletal:  Negative for myalgias.   Gastrointestinal:  Negative for hematochezia and melena.   Genitourinary:  Negative for hematuria.   Neurological:  Negative for light-headedness and weakness.     Current Outpatient Medications   Medication Instructions    amLODIPine (NORVASC) 5 mg, Oral, Daily    aspirin (ECOTRIN LOW STRENGTH) 81 mg, Oral, Daily    clobetasol (TEMOVATE) 0.05 % cream Topical, 2 times daily    clopidogrel (PLAVIX) 75 mg, Oral, Daily    metoprolol succinate (TOPROL-XL) 25 mg, Oral, Daily    omeprazole (PRILOSEC) 40 mg, Oral, Every morning    pantoprazole (PROTONIX) 40 mg, Oral, Daily    rosuvastatin (CRESTOR) 20 mg, Oral, Daily      Vitals:    06/14/24 0758   BP: 160/90   Pulse: 62   Resp:    Temp:    SpO2:      Physical Exam  Vitals and nursing note reviewed.   HENT:      Head: Normocephalic and atraumatic.      Nose: Nose normal.      Mouth/Throat:      Mouth: Mucous membranes are moist.   Eyes:      Conjunctiva/sclera: Conjunctivae normal.   Neck:      Vascular: No JVD.   Cardiovascular:      Rate and Rhythm: Regular rhythm.      Pulses:           Radial pulses are 2+ on the right side and 2+ on the left side.      Heart sounds: S1 normal  and S2 normal. Murmur heard.      Systolic murmur is present with a grade of 3/6.      Comments: Hr approx 60 bpm  Pulmonary:      Effort: Pulmonary effort is normal.      Breath sounds: No wheezing, rhonchi or rales.   Chest:      Chest wall: No tenderness.   Abdominal:      General: Abdomen is flat.   Musculoskeletal:         General: No swelling. Normal range of motion.   Skin:     General: Skin is warm and dry.      Comments: Right wrist no hematoma or ecchymosis no bleeding   Neurological:      General: No focal deficit present.      Mental Status: He is alert.   Psychiatric:         Behavior: Behavior normal.      Pertinent Labs/diagnostics:  I personally reviewed the BMP and CBC from 2024.    Lipid Profile:   2024 cholesterol 136, triglycerides 94, HDL 27, LDL 90    Cardiac testin2024 Stress test: Markedly abnormal exercise treadmill stress echo & Clinically abnormal for limiting angina. Electrocardiographic and echocardiographic evidence of ischemia at low workload.    Discharge instructions/Information to patient and family:   See after visit summary for information provided to patient and family.      Provisions for Follow-Up Care:  See after visit summary for information related to follow-up care and any pertinent home health orders.      Planned Readmission: No    Discharge Statement:  I spent 1 hour minutes discharging the patient. This time was spent on the day of discharge. I had direct contact with the patient on the day of discharge. Additional documentation is required if more than 30 minutes were spent on discharge.     ** Please Note: Fluency Direct Dictation voice to text software may have been used in the creation of this document. **

## 2024-06-13 NOTE — DISCHARGE INSTRUCTIONS
LEFT HEART CATHETERIZATION    WHAT YOU NEED TO KNOW:   A left heart catheterization is a procedure to look at your heart and its arteries.        DISCHARGE INSTRUCTIONS:   Follow up with your healthcare provider:  Write down your questions so you remember to ask them during your visits.    Limit activity as directed:   Do not place pressure on your arm, hand, or wrist, if the catheter was placed in your wrist. Avoid pushing, pulling, or heavy lifting with that arm.    If you need to cough, support the area where the catheter was inserted with your hand.    You may feel like resting more after your procedure. Slowly start to do more each day. Rest when you feel it is needed.    Contact your healthcare provider if:   You have a fever.     The skin around your wound is red, swollen, or has pus coming from it.     You have trouble breathing, or your skin is itchy, swollen, or has a rash.     You have questions or concerns about your condition or care.    Seek care immediately or call 911 if:   The area where the catheter was placed is swollen and filled with blood or is bleeding.     The leg or arm used for the procedure becomes numb or turns white or blue.    You feel lightheaded, short of breath, and have chest pain.     You cough up blood.     You have any of the following signs of a heart attack:      Squeezing, pressure, or pain in your chest that lasts longer than 5 minutes or returns    Discomfort or pain in your back, neck, jaw, stomach, or arm     Trouble breathing    Nausea or vomiting    Lightheadedness or a sudden cold sweat, especially with chest pain or trouble breathing    Your arm or leg feels warm, tender, and painful. It may look swollen and red.    You have any of the following signs of a stroke:     Part of your face droops or is numb    Weakness in an arm or leg    Confusion or difficulty speaking    Dizziness, a severe headache, or vision loss    © 2017 [employer] Information is  for End User's use only and may not be sold, redistributed or otherwise used for commercial purposes. All illustrations and images included in CareNotes® are the copyrighted property of [employer] or [employer].      Coronary Intravascular Stent Placement, Ambulatory Care     GENERAL INFORMATION:   What do I need to know about coronary intravascular stent placement?  Coronary intravascular stent placement is a procedure to place a stent in an artery of your heart that has plaque buildup. Plaque is a mixture of fat and cholesterol. A stent is a small mesh tube made of metal that helps keep your artery open. The doctor placed a drug-eluting stent (DAVIAN) in your artery. A DAVIAN is coated with medicine that is slowly released and helps prevent more plaque buildup in the area where the stent is placed. The stent remains in your artery for life. You got 1 stent.         A catheter (long, thin tube) was put into your artery, in your wrist. The catheter was guided through this artery to your heart and into the narrowed or blocked artery. The doctor used x-rays and dye to find the area where the stent needed to be placed. A guidewire was then placed into the catheter. The balloon catheter was guided into the narrowed or blocked artery using the guidewire. The doctor inflated the balloon several times for a few seconds. The inflated balloon pushes the plaque against the artery walls. This opens them and allows more blood flow to your heart. Another balloon catheter with a stent was then inserted into the artery. The balloon was inflated. This expanded the stent and pushes it into place against the artery wall. The stent is be left in your artery to help keep it open forever.

## 2024-06-13 NOTE — INTERVAL H&P NOTE
"Prior note reviewed.  Patient seen and examined.    /97   Pulse 61   Temp 98.1 °F (36.7 °C) (Temporal)   Resp 18   Ht 5' 9\" (1.753 m)   Wt 88.5 kg (195 lb)   SpO2 98%   BMI 28.80 kg/m²      After examining the patient, I find no significant changes to the note since it has been written.     Accept for today's history and physical.    Fang Haddad, DO 06/13/24    "

## 2024-06-14 VITALS
HEIGHT: 69 IN | DIASTOLIC BLOOD PRESSURE: 90 MMHG | BODY MASS INDEX: 28.88 KG/M2 | OXYGEN SATURATION: 97 % | RESPIRATION RATE: 18 BRPM | TEMPERATURE: 97.4 F | WEIGHT: 195 LBS | SYSTOLIC BLOOD PRESSURE: 160 MMHG | HEART RATE: 62 BPM

## 2024-06-14 LAB
ANION GAP SERPL CALCULATED.3IONS-SCNC: 6 MMOL/L (ref 4–13)
BUN SERPL-MCNC: 17 MG/DL (ref 5–25)
CALCIUM SERPL-MCNC: 8.9 MG/DL (ref 8.4–10.2)
CHLORIDE SERPL-SCNC: 105 MMOL/L (ref 96–108)
CO2 SERPL-SCNC: 25 MMOL/L (ref 21–32)
CREAT SERPL-MCNC: 0.7 MG/DL (ref 0.6–1.3)
ERYTHROCYTE [DISTWIDTH] IN BLOOD BY AUTOMATED COUNT: 14.2 % (ref 11.6–15.1)
GFR SERPL CREATININE-BSD FRML MDRD: 101 ML/MIN/1.73SQ M
GLUCOSE SERPL-MCNC: 99 MG/DL (ref 65–140)
HCT VFR BLD AUTO: 40.3 % (ref 36.5–49.3)
HGB BLD-MCNC: 13.4 G/DL (ref 12–17)
MCH RBC QN AUTO: 28.7 PG (ref 26.8–34.3)
MCHC RBC AUTO-ENTMCNC: 33.3 G/DL (ref 31.4–37.4)
MCV RBC AUTO: 86 FL (ref 82–98)
PLATELET # BLD AUTO: 190 THOUSANDS/UL (ref 149–390)
PMV BLD AUTO: 11.9 FL (ref 8.9–12.7)
POTASSIUM SERPL-SCNC: 3.9 MMOL/L (ref 3.5–5.3)
RBC # BLD AUTO: 4.67 MILLION/UL (ref 3.88–5.62)
SODIUM SERPL-SCNC: 136 MMOL/L (ref 135–147)
WBC # BLD AUTO: 5.82 THOUSAND/UL (ref 4.31–10.16)

## 2024-06-14 PROCEDURE — 80048 BASIC METABOLIC PNL TOTAL CA: CPT | Performed by: INTERNAL MEDICINE

## 2024-06-14 PROCEDURE — NC001 PR NO CHARGE: Performed by: PHYSICIAN ASSISTANT

## 2024-06-14 PROCEDURE — 85027 COMPLETE CBC AUTOMATED: CPT | Performed by: PHYSICIAN ASSISTANT

## 2024-06-14 RX ORDER — AMLODIPINE BESYLATE 5 MG/1
5 TABLET ORAL DAILY
Qty: 30 TABLET | Refills: 5 | Status: SHIPPED | OUTPATIENT
Start: 2024-06-14

## 2024-06-14 RX ADMIN — LABETALOL HYDROCHLORIDE 10 MG: 5 INJECTION, SOLUTION INTRAVENOUS at 00:07

## 2024-06-14 RX ADMIN — METOPROLOL SUCCINATE 25 MG: 25 TABLET, EXTENDED RELEASE ORAL at 08:00

## 2024-06-14 RX ADMIN — ASPIRIN 81 MG: 81 TABLET, COATED ORAL at 08:00

## 2024-06-14 RX ADMIN — LABETALOL HYDROCHLORIDE 10 MG: 5 INJECTION, SOLUTION INTRAVENOUS at 07:26

## 2024-06-14 RX ADMIN — PANTOPRAZOLE SODIUM 40 MG: 40 TABLET, DELAYED RELEASE ORAL at 05:59

## 2024-06-14 RX ADMIN — CLOPIDOGREL BISULFATE 75 MG: 75 TABLET ORAL at 08:00

## 2024-06-14 NOTE — PLAN OF CARE
Problem: PAIN - ADULT  Goal: Verbalizes/displays adequate comfort level or baseline comfort level  Description: Interventions:  - Encourage patient to monitor pain and request assistance  - Assess pain using appropriate pain scale  - Administer analgesics based on type and severity of pain and evaluate response  - Implement non-pharmacological measures as appropriate and evaluate response  - Consider cultural and social influences on pain and pain management  - Notify physician/advanced practitioner if interventions unsuccessful or patient reports new pain  Outcome: Progressing     Problem: INFECTION - ADULT  Goal: Absence or prevention of progression during hospitalization  Description: INTERVENTIONS:  - Assess and monitor for signs and symptoms of infection  - Monitor lab/diagnostic results  - Monitor all insertion sites, i.e. indwelling lines, tubes, and drains  - Monitor endotracheal if appropriate and nasal secretions for changes in amount and color  - Kevil appropriate cooling/warming therapies per order  - Administer medications as ordered  - Instruct and encourage patient and family to use good hand hygiene technique  - Identify and instruct in appropriate isolation precautions for identified infection/condition  Outcome: Progressing  Goal: Absence of fever/infection during neutropenic period  Description: INTERVENTIONS:  - Monitor WBC    Outcome: Progressing     Problem: DISCHARGE PLANNING  Goal: Discharge to home or other facility with appropriate resources  Description: INTERVENTIONS:  - Identify barriers to discharge w/patient and caregiver  - Arrange for needed discharge resources and transportation as appropriate  - Identify discharge learning needs (meds, wound care, etc.)  - Arrange for interpretive services to assist at discharge as needed  - Refer to Case Management Department for coordinating discharge planning if the patient needs post-hospital services based on physician/advanced  practitioner order or complex needs related to functional status, cognitive ability, or social support system  Outcome: Progressing     Problem: Knowledge Deficit  Goal: Patient/family/caregiver demonstrates understanding of disease process, treatment plan, medications, and discharge instructions  Description: Complete learning assessment and assess knowledge base.  Interventions:  - Provide teaching at level of understanding  - Provide teaching via preferred learning methods  Outcome: Progressing     Problem: NEUROSENSORY - ADULT  Goal: Achieves stable or improved neurological status  Description: INTERVENTIONS  - Monitor and report changes in neurological status  - Monitor vital signs such as temperature, blood pressure, glucose, and any other labs ordered   - Initiate measures to prevent increased intracranial pressure  - Monitor for seizure activity and implement precautions if appropriate      Outcome: Progressing  Goal: Remains free of injury related to seizures activity  Description: INTERVENTIONS  - Maintain airway, patient safety  and administer oxygen as ordered  - Monitor patient for seizure activity, document and report duration and description of seizure to physician/advanced practitioner  - If seizure occurs,  ensure patient safety during seizure  - Reorient patient post seizure  - Seizure pads on all 4 side rails  - Instruct patient/family to notify RN of any seizure activity including if an aura is experienced  - Instruct patient/family to call for assistance with activity based on nursing assessment  - Administer anti-seizure medications if ordered    Outcome: Progressing  Goal: Achieves maximal functionality and self care  Description: INTERVENTIONS  - Monitor swallowing and airway patency with patient fatigue and changes in neurological status  - Encourage and assist patient to increase activity and self care.   - Encourage visually impaired, hearing impaired and aphasic patients to use  assistive/communication devices  Outcome: Progressing     Problem: CARDIOVASCULAR - ADULT  Goal: Maintains optimal cardiac output and hemodynamic stability  Description: INTERVENTIONS:  - Monitor I/O, vital signs and rhythm  - Monitor for S/S and trends of decreased cardiac output  - Administer and titrate ordered vasoactive medications to optimize hemodynamic stability  - Assess quality of pulses, skin color and temperature  - Assess for signs of decreased coronary artery perfusion  - Instruct patient to report change in severity of symptoms  Outcome: Progressing  Goal: Absence of cardiac dysrhythmias or at baseline rhythm  Description: INTERVENTIONS:  - Continuous cardiac monitoring, vital signs, obtain 12 lead EKG if ordered  - Administer antiarrhythmic and heart rate control medications as ordered  - Monitor electrolytes and administer replacement therapy as ordered  Outcome: Progressing     Problem: RESPIRATORY - ADULT  Goal: Achieves optimal ventilation and oxygenation  Description: INTERVENTIONS:  - Assess for changes in respiratory status  - Assess for changes in mentation and behavior  - Position to facilitate oxygenation and minimize respiratory effort  - Oxygen administered by appropriate delivery if ordered  - Initiate smoking cessation education as indicated  - Encourage broncho-pulmonary hygiene including cough, deep breathe, Incentive Spirometry  - Assess the need for suctioning and aspirate as needed  - Assess and instruct to report SOB or any respiratory difficulty  - Respiratory Therapy support as indicated  Outcome: Progressing     Problem: GASTROINTESTINAL - ADULT  Goal: Minimal or absence of nausea and/or vomiting  Description: INTERVENTIONS:  - Administer IV fluids if ordered to ensure adequate hydration  - Maintain NPO status until nausea and vomiting are resolved  - Nasogastric tube if ordered  - Administer ordered antiemetic medications as needed  - Provide nonpharmacologic comfort measures  as appropriate  - Advance diet as tolerated, if ordered  - Consider nutrition services referral to assist patient with adequate nutrition and appropriate food choices  Outcome: Progressing  Goal: Maintains or returns to baseline bowel function  Description: INTERVENTIONS:  - Assess bowel function  - Encourage oral fluids to ensure adequate hydration  - Administer IV fluids if ordered to ensure adequate hydration  - Administer ordered medications as needed  - Encourage mobilization and activity  - Consider nutritional services referral to assist patient with adequate nutrition and appropriate food choices  Outcome: Progressing  Goal: Maintains adequate nutritional intake  Description: INTERVENTIONS:  - Monitor percentage of each meal consumed  - Identify factors contributing to decreased intake, treat as appropriate  - Assist with meals as needed  - Monitor I&O, weight, and lab values if indicated  - Obtain nutrition services referral as needed  Outcome: Progressing  Goal: Establish and maintain optimal ostomy function  Description: INTERVENTIONS:  - Assess bowel function  - Encourage oral fluids to ensure adequate hydration  - Administer IV fluids if ordered to ensure adequate hydration   - Administer ordered medications as needed  - Encourage mobilization and activity  - Nutrition services referral to assist patient with appropriate food choices  - Assess stoma site  - Consider wound care consult   Outcome: Progressing  Goal: Oral mucous membranes remain intact  Description: INTERVENTIONS  - Assess oral mucosa and hygiene practices  - Implement preventative oral hygiene regimen  - Implement oral medicated treatments as ordered  - Initiate Nutrition services referral as needed  Outcome: Progressing     Problem: GENITOURINARY - ADULT  Goal: Maintains or returns to baseline urinary function  Description: INTERVENTIONS:  - Assess urinary function  - Encourage oral fluids to ensure adequate hydration if ordered  -  Administer IV fluids as ordered to ensure adequate hydration  - Administer ordered medications as needed  - Offer frequent toileting  - Follow urinary retention protocol if ordered  Outcome: Progressing  Goal: Absence of urinary retention  Description: INTERVENTIONS:  - Assess patient’s ability to void and empty bladder  - Monitor I/O  - Bladder scan as needed  - Discuss with physician/AP medications to alleviate retention as needed  - Discuss catheterization for long term situations as appropriate  Outcome: Progressing  Goal: Urinary catheter remains patent  Description: INTERVENTIONS:  - Assess patency of urinary catheter  - If patient has a chronic garsia, consider changing catheter if non-functioning  - Follow guidelines for intermittent irrigation of non-functioning urinary catheter  Outcome: Progressing     Problem: METABOLIC, FLUID AND ELECTROLYTES - ADULT  Goal: Electrolytes maintained within normal limits  Description: INTERVENTIONS:  - Monitor labs and assess patient for signs and symptoms of electrolyte imbalances  - Administer electrolyte replacement as ordered  - Monitor response to electrolyte replacements, including repeat lab results as appropriate  - Instruct patient on fluid and nutrition as appropriate  Outcome: Progressing  Goal: Fluid balance maintained  Description: INTERVENTIONS:  - Monitor labs   - Monitor I/O and WT  - Instruct patient on fluid and nutrition as appropriate  - Assess for signs & symptoms of volume excess or deficit  Outcome: Progressing  Goal: Glucose maintained within target range  Description: INTERVENTIONS:  - Monitor Blood Glucose as ordered  - Assess for signs and symptoms of hyperglycemia and hypoglycemia  - Administer ordered medications to maintain glucose within target range  - Assess nutritional intake and initiate nutrition service referral as needed  Outcome: Progressing     Problem: HEMATOLOGIC - ADULT  Goal: Maintains hematologic stability  Description:  INTERVENTIONS  - Assess for signs and symptoms of bleeding or hemorrhage  - Monitor labs  - Administer supportive blood products/factors as ordered and appropriate  Outcome: Progressing

## 2024-06-14 NOTE — PLAN OF CARE
Problem: DISCHARGE PLANNING  Goal: Discharge to home or other facility with appropriate resources  Description: INTERVENTIONS:  - Identify barriers to discharge w/patient and caregiver  - Arrange for needed discharge resources and transportation as appropriate  - Identify discharge learning needs (meds, wound care, etc.)  - Arrange for interpretive services to assist at discharge as needed  - Refer to Case Management Department for coordinating discharge planning if the patient needs post-hospital services based on physician/advanced practitioner order or complex needs related to functional status, cognitive ability, or social support system  6/14/2024 1302 by Angeles Michael, RN  Outcome: Adequate for Discharge  6/14/2024 0913 by Angeles Michael, RN  Outcome: Progressing

## 2024-06-14 NOTE — PLAN OF CARE
Problem: Knowledge Deficit  Goal: Patient/family/caregiver demonstrates understanding of disease process, treatment plan, medications, and discharge instructions  Description: Complete learning assessment and assess knowledge base.  Interventions:  - Provide teaching at level of understanding  - Provide teaching via preferred learning methods  Outcome: Progressing     Problem: PAIN - ADULT  Goal: Verbalizes/displays adequate comfort level or baseline comfort level  Description: Interventions:  - Encourage patient to monitor pain and request assistance  - Assess pain using appropriate pain scale  - Administer analgesics based on type and severity of pain and evaluate response  - Implement non-pharmacological measures as appropriate and evaluate response  - Consider cultural and social influences on pain and pain management  - Notify physician/advanced practitioner if interventions unsuccessful or patient reports new pain  Outcome: Progressing     Problem: CARDIOVASCULAR - ADULT  Goal: Maintains optimal cardiac output and hemodynamic stability  Description: INTERVENTIONS:  - Monitor I/O, vital signs and rhythm  - Monitor for S/S and trends of decreased cardiac output  - Administer and titrate ordered vasoactive medications to optimize hemodynamic stability  - Assess quality of pulses, skin color and temperature  - Assess for signs of decreased coronary artery perfusion  - Instruct patient to report change in severity of symptoms  Outcome: Progressing

## 2024-06-14 NOTE — NURSING NOTE
Patient has been discharged. Instructions reviewed including stent documents, expressed understanding. Belongings reviewed and returned. Acoompanied off unit by RN via wheelchair.

## 2024-06-25 ENCOUNTER — OFFICE VISIT (OUTPATIENT)
Dept: CARDIOLOGY CLINIC | Facility: CLINIC | Age: 64
End: 2024-06-25
Payer: COMMERCIAL

## 2024-06-25 VITALS
BODY MASS INDEX: 28.14 KG/M2 | TEMPERATURE: 97.4 F | SYSTOLIC BLOOD PRESSURE: 120 MMHG | DIASTOLIC BLOOD PRESSURE: 66 MMHG | OXYGEN SATURATION: 98 % | HEIGHT: 69 IN | WEIGHT: 190 LBS | HEART RATE: 59 BPM

## 2024-06-25 DIAGNOSIS — I25.118 CORONARY ARTERY DISEASE OF NATIVE ARTERY OF NATIVE HEART WITH STABLE ANGINA PECTORIS (HCC): ICD-10-CM

## 2024-06-25 DIAGNOSIS — I10 PRIMARY HYPERTENSION: ICD-10-CM

## 2024-06-25 DIAGNOSIS — E78.5 DYSLIPIDEMIA: ICD-10-CM

## 2024-06-25 DIAGNOSIS — R00.2 PALPITATIONS: ICD-10-CM

## 2024-06-25 PROCEDURE — 99214 OFFICE O/P EST MOD 30 MIN: CPT

## 2024-06-25 RX ORDER — NITROGLYCERIN 0.4 MG/1
0.4 TABLET SUBLINGUAL
Qty: 30 TABLET | Refills: 1 | Status: SHIPPED | OUTPATIENT
Start: 2024-06-25

## 2024-06-25 NOTE — PATIENT INSTRUCTIONS
You can start taking co-enzyme Q-10 which is an other the counter medication to see if it helps with the cramping you are experiencing in your legs. Please contact me in about 1/2 weeks to let me know if you have any improvement.     You can call the office with any questions or concerns.     Thank you,  -JU Borrero

## 2024-06-25 NOTE — PROGRESS NOTES
Ramon Mora  1960  [id number]  [facility] [address] [facility] [address] [phone]    1. Coronary artery disease of native artery of native heart with stable angina pectoris (HCC)  nitroglycerin (NITROSTAT) 0.4 mg SL tablet      2. Primary hypertension        3. Palpitations  Holter monitor      4. Dyslipidemia  Lipid Panel with Direct LDL reflex        Summary/Discussion:  Coronary artery disease:  - s/p PCI and DAVIAN x 1 to mid Cx on 6/13 after significantly abnormal stress echo on 5/31  residual prox RCA lesion 45% stenosed; continue medical management   - without symptoms of angina  - continue on aspirin, plavix, statin, and metoprolol. PRN SL nitro prescribed  - right radial cath site C/D/I without significant ecchymosis or hematoma noted. Neurovascular exam WDL  - adherence to DTAP therapy reinforced in which patient has been taking without any missed doses  - diet/lifestyle modifications were discussed   - encourage cardiac rehab     Hypertension:  - controlled, /66  - continue present medication regimen  - lifestyle modification   - close blood pressure monitoring     Dyslipidemia:  - Lipid Profile:    Latest Reference Range & Units 05/04/24 08:01   Cholesterol See Comment mg/dL 136   Triglycerides See Comment mg/dL 94   HDL >=40 mg/dL 27 (L)   LDL Calculated 0 - 100 mg/dL 90   - continue statin therapy with LDL goal <55; will repeat lipids in 8-12 weeks to reassess with recent initiation of statin therapy  - he does report mild myalgia since starting rosuvastatin   recommend that he try taking OTC co-enzyme Q-10 to see if this helps with his symptoms. If this does not help would try switching him to atorvastatin 40 mg daily    - encouraged low cholesterol diet and annual lipid follow up    Palpitations:   - he reports noticing daily palpitations shortly after his recent cardiac stent placement. He denies any associated symptoms   -  recommend 48 hour Holter monitor  - continue metoprolol     Interval History: Ramon Mora is a 63 y.o. year old male with history of newly diagnosed CAD with recent PCI and DAVIAN x 1 to mid Cx on , HTN, and dyslipidemia who presents to the office today for a follow up s/p cardiac stent placement.     He reports noticing daily palpitations since his cardiac stent placement. He denies any associated symptoms of lightheadedness, dizziness, near syncope, or syncope. He also reports experiencing myalgia at night since recent start of rosuvastatin. He has also been experiencing left arm numbness that only happens at night when he is sleeping which resolves when he moves it. He denies any chest pain/pressure/discomfort or shortness of breath. He denies lower extremity edema, orthopnea, and PND. He has been taking his medications as prescribed without missing any doses.       He will RTO in 3 months with Dr. Umanzor or sooner if necessary. He will call with any concerns.       Medical Problems       Problem List       Family history of Alzheimer's disease    Coronary artery disease of native artery of native heart with stable angina pectoris (HCC)    S/P angioplasty with stent        Past Medical History:   Diagnosis Date    Lyme disease      Social History     Socioeconomic History    Marital status: /Civil Union     Spouse name: Not on file    Number of children: Not on file    Years of education: Not on file    Highest education level: Not on file   Occupational History    Not on file   Tobacco Use    Smoking status: Former     Types: Cigarettes     Start date:      Quit date: 1975     Years since quittin.5     Passive exposure: Past    Smokeless tobacco: Never   Vaping Use    Vaping status: Never Used   Substance and Sexual Activity    Alcohol use: Not Currently    Drug use: Not Currently    Sexual activity: Not on file     Comment: defer   Other Topics Concern    Not on file   Social History  Narrative    Not on file     Social Determinants of Health     Financial Resource Strain: Not on file   Food Insecurity: No Food Insecurity (6/13/2024)    Hunger Vital Sign     Worried About Running Out of Food in the Last Year: Never true     Ran Out of Food in the Last Year: Never true   Transportation Needs: No Transportation Needs (6/13/2024)    PRAPARE - Transportation     Lack of Transportation (Medical): No     Lack of Transportation (Non-Medical): No   Physical Activity: Not on file   Stress: Not on file   Social Connections: Not on file   Intimate Partner Violence: Not on file   Housing Stability: Low Risk  (6/13/2024)    Housing Stability Vital Sign     Unable to Pay for Housing in the Last Year: No     Number of Times Moved in the Last Year: 1     Homeless in the Last Year: No      Family History   Problem Relation Age of Onset    Lymphoma Mother     Melanoma Father     Prostate cancer Maternal Grandfather      Past Surgical History:   Procedure Laterality Date    CARDIAC CATHETERIZATION Left 6/13/2024    Procedure: Cardiac Left Heart Cath;  Surgeon: Fang Haddad DO;  Location: [facility];  Service: Cardiology    CARDIAC CATHETERIZATION  6/13/2024    Procedure: Cardiac catheterization;  Surgeon: Fang Haddad DO;  Location: [facility];  Service: Cardiology    CARDIAC CATHETERIZATION N/A 6/13/2024    Procedure: Cardiac Coronary Angiogram;  Surgeon: Fang Haddad DO;  Location: [facility];  Service: Cardiology    CARDIAC CATHETERIZATION N/A 6/13/2024    Procedure: Cardiac FFR/IFR;  Surgeon: Fang Haddad DO;  Location: [facility];  Service: Cardiology    CARDIAC CATHETERIZATION N/A 6/13/2024    Procedure: Cardiac PCI Stent;  Surgeon: Fang Haddad DO;  Location: [facility];  Service: Cardiology       Current Outpatient Medications:     amLODIPine (NORVASC) 5 mg tablet, Take 1 tablet (5 mg total) by mouth daily, Disp: 30 tablet, Rfl: 5     "aspirin (ECOTRIN LOW STRENGTH) 81 mg EC tablet, Take 1 tablet (81 mg total) by mouth daily, Disp: 30 tablet, Rfl: 12    clopidogrel (PLAVIX) 75 mg tablet, Take 1 tablet (75 mg total) by mouth daily Do not start before June 14, 2024., Disp: 30 tablet, Rfl: 12    metoprolol succinate (TOPROL-XL) 25 mg 24 hr tablet, Take 1 tablet (25 mg total) by mouth daily, Disp: 90 tablet, Rfl: 3    nitroglycerin (NITROSTAT) 0.4 mg SL tablet, Place 1 tablet (0.4 mg total) under the tongue every 5 (five) minutes as needed for chest pain, Disp: 30 tablet, Rfl: 1    rosuvastatin (CRESTOR) 20 MG tablet, Take 1 tablet (20 mg total) by mouth daily, Disp: 90 tablet, Rfl: 0  No Known Allergies    Labs:     Chemistry        Component Value Date/Time    K 3.9 06/14/2024 0547     06/14/2024 0547    CO2 25 06/14/2024 0547    BUN 17 06/14/2024 0547    CREATININE 0.70 06/14/2024 0547        Component Value Date/Time    CALCIUM 8.9 06/14/2024 0547    ALKPHOS 69 06/09/2024 0846    AST 14 06/09/2024 0846    ALT 16 06/09/2024 0846            No results found for: \"CHOL\"  Lab Results   Component Value Date    HDL 27 (L) 05/04/2024    HDL 39 (L) 05/06/2023    HDL 40 02/19/2022     Lab Results   Component Value Date    LDLCALC 90 05/04/2024    LDLCALC 118 (H) 05/06/2023    LDLCALC 150 (H) 02/19/2022     Lab Results   Component Value Date    TRIG 94 05/04/2024    TRIG 125 05/06/2023    TRIG 138.0 02/19/2022     No results found for: \"CHOLHDL\"    Imaging: Cardiac catheterization    Result Date: 6/13/2024  Narrative:   S/P successful PCI with DAVIAN x 1 to the Mid Cx lesion 65% stenosis   Prox RCA lesion is 45% stenosed and amenable to medical management   LVEDP is normal without gradient on LV-AO pullback     Stress strip    Result Date: 5/31/2024  Narrative: Confirmed by LUH STEVENS ([id number]),  Funmi Paula ([id number]) on 5/31/2024 1:23:30 PM    Echo stress test, exercise    Result Date: 5/31/2024  Narrative:   Stress ECG: Overall, the patient's " exercise capacity was moderately impaired for their age. The patient after exercising for 4 min and 5 sec and had a maximal HR of 118 bpm (80% of MPHR) and 5.8 METS. The patient experienced exercise-limiting angina during the test.   Stress ECG: Horizontal ST depression of 2.5-3.0 mm in leads II, III and aVF noted.  In addition 1.5 mm ST depressions noted in leads V3-V6.  ST depressions were noted at low workload and persisted about 3 minutes into recovery. The stress ECG is consistent with ischemia after maximal exercise, with reproduction of symptoms.   Peak Stress Echo: Left ventricle cavity has normal reduction in size at peak stress. The peak stress echo showed new anterolateral wall motion abnormalities compared to baseline.   Echo Post Impression: The study is consistent with ischemia, after maximal exercise. Markedly abnormal exercise treadmill stress echo as noted above. Clinically abnormal for limiting angina. Electrocardiographic and echocardiographic evidence of ischemia at low workload. Further ischemic evaluation should be considered with cardiac catheterization if clinically indicated. ER precautions reviewed with the patient.     Echo complete w/ contrast if indicated    Result Date: 5/31/2024  Narrative:   Left Ventricle: Left ventricular cavity size is normal. Wall thickness is mildly increased. The left ventricular ejection fraction is 50% by single dimension measurement. Systolic function is low normal. Diastolic function is mildly abnormal, consistent with grade I (abnormal) relaxation.  Left atrial filling pressure is elevated.   Right Ventricle: Right ventricular cavity size is normal. Systolic function is normal.   Left Atrium: The atrium is mildly dilated (35-41 mL/m2).   Right Atrium: The atrium is mildly dilated.   Aortic Valve: The aortic valve is trileaflet.  There is moderate area of calcification noted.  This appears to be subvalvular in location.   The leaflets exhibit normal  "mobility. There is mild regurgitation. The aortic valve has no significant stenosis.   Mitral Valve: There is mild annular calcification. There is mild regurgitation.   Pulmonic Valve: There is mild regurgitation.   Prior TTE study available for comparison. Prior study date: 5/5/2023. No significant changes noted compared to the prior study.       ECG:  n/a    Review of Systems   Constitutional: Negative.   HENT: Negative.     Eyes: Negative.    Cardiovascular:  Positive for palpitations.   Respiratory: Negative.     Endocrine: Negative.    Hematologic/Lymphatic: Negative for bleeding problem.   Skin: Negative.    Musculoskeletal:  Positive for myalgias.   Gastrointestinal: Negative.    Genitourinary: Negative.    Psychiatric/Behavioral: Negative.     Allergic/Immunologic: Negative.        Vitals:    06/25/24 1337   BP: 120/66   Pulse: 59   Temp: (!) 97.4 °F (36.3 °C)   SpO2: 98%     Vitals:    06/25/24 1337   Weight: 86.2 kg (190 lb)     Height: 5' 9\" (175.3 cm)   Body mass index is 28.06 kg/m².    Physical Exam  Vitals and nursing note reviewed.   Constitutional:       General: He is not in acute distress.     Appearance: He is not ill-appearing.   HENT:      Head: Normocephalic.      Nose: Nose normal.      Mouth/Throat:      Mouth: Mucous membranes are moist.      Pharynx: Oropharynx is clear.   Cardiovascular:      Rate and Rhythm: Normal rate and regular rhythm.      Heart sounds: No murmur heard.  Pulmonary:      Effort: Pulmonary effort is normal.      Breath sounds: Normal breath sounds.   Musculoskeletal:         General: Normal range of motion.      Cervical back: Normal range of motion.      Right lower leg: No edema.      Left lower leg: No edema.   Skin:     General: Skin is warm and dry.   Neurological:      Mental Status: He is alert and oriented to person, place, and time.   Psychiatric:         Mood and Affect: Mood normal.         Behavior: Behavior normal.        "

## 2024-07-10 ENCOUNTER — HOSPITAL ENCOUNTER (OUTPATIENT)
Dept: NON INVASIVE DIAGNOSTICS | Facility: HOSPITAL | Age: 64
Discharge: HOME | End: 2024-07-10
Payer: COMMERCIAL

## 2024-07-10 DIAGNOSIS — R00.2 PALPITATIONS: ICD-10-CM

## 2024-07-10 PROCEDURE — 93225 XTRNL ECG REC<48 HRS REC: CPT

## 2024-07-10 PROCEDURE — 93226 XTRNL ECG REC<48 HR SCAN A/R: CPT

## 2024-07-16 PROCEDURE — 93227 XTRNL ECG REC<48 HR R&I: CPT | Performed by: INTERNAL MEDICINE

## 2024-07-22 ENCOUNTER — CLINICAL SUPPORT (OUTPATIENT)
Dept: CARDIAC REHAB | Facility: CLINIC | Age: 64
End: 2024-07-22
Payer: COMMERCIAL

## 2024-07-22 DIAGNOSIS — Z95.820 S/P ANGIOPLASTY WITH STENT: Primary | ICD-10-CM

## 2024-07-22 PROCEDURE — 93798 PHYS/QHP OP CAR RHAB W/ECG: CPT

## 2024-07-22 NOTE — PROGRESS NOTES
CARDIAC REHABILITATION   ASSESSMENT AND INDIVIDUALIZED TREATMENT PLAN  INITIAL           Today's date: 2024   # of Exercise Sessions Completed: 1- initial eval  Patient name: Ramon Mora      : 1960  Age: 63 y.o.       MRN: [id number]  Referring Physician: Fang Haddad DO  Cardiologist: Bhavin Umanzor MD  Provider: Javid  Clinician: Yeimy Cortez MS, CEP, EPC        Treatment is tailored to this patient's individual needs.  The ITP was reviewed with the patient and all questions were answered to their satisfaction.  Additional ITP documentation can be found electronically including daily and monthly exercise summaries, daily session notes with ECG summaries, education notes, daily medication reconciliation, and daily physician supervision.      Comments: Serbian speaking, wife translating        Dx: No diagnosis found.    Description of Diagnosis: patient had a stress echo on 24 for anginal symptoms that was abnormal. He was then scheduled for a cardiac cath on 24 which revealed LCx 65% stenosed s/p PCI with DAVIAN x1, prox RCA 45% stenosed, LAD mild-diffuse disease  Date of onset: 24 and 24  Other Cardiac History: none        ASSESSMENT    Medical History:   Past Medical History:   Diagnosis Date    Lyme disease        Family History:  Family History   Problem Relation Age of Onset    Lymphoma Mother     Melanoma Father     Prostate cancer Maternal Grandfather        Allergies:   Patient has no known allergies.    Current Medications:   Current Outpatient Medications   Medication Sig Dispense Refill    amLODIPine (NORVASC) 5 mg tablet Take 1 tablet (5 mg total) by mouth daily 30 tablet 5    aspirin (ECOTRIN LOW STRENGTH) 81 mg EC tablet Take 1 tablet (81 mg total) by mouth daily 30 tablet 12    clopidogrel (PLAVIX) 75 mg tablet Take 1 tablet (75 mg total) by mouth daily Do not start before 2024. 30 tablet 12    metoprolol succinate (TOPROL-XL) 25 mg 24 hr tablet  Take 1 tablet (25 mg total) by mouth daily 90 tablet 3    nitroglycerin (NITROSTAT) 0.4 mg SL tablet Place 1 tablet (0.4 mg total) under the tongue every 5 (five) minutes as needed for chest pain 30 tablet 1    rosuvastatin (CRESTOR) 20 MG tablet Take 1 tablet (20 mg total) by mouth daily 90 tablet 0     No current facility-administered medications for this visit.       Medication compliance: Yes   Comments: Pt reports to be compliant with medications. Was unaware of NTG rx ordered by Adria DODD. Going [facility] about getting    Physical Limitations: none    Fall Risk: Low   Comments: Ambulates with a steady gait with no assist device    Cultural needs: none      CAD Risk Factors:  Cholesterol: No  HTN: Yes  DM: No  Obesity: overweight   Inactivity: Yes      EXERCISE ASSESSMENT:     Initial Fitness Assessment: Submaximal TM ETT:  Resting:  BP: 126/76  HR: 60, Exercise:  BP: 156/76  HR: 86, METs:  3.1, ECG Summary: NSR with occ PVCs, PACs, atrial couplets, and Test terminated at:  RPE 6      ECG INTERPRETATION:  NSR with occ PVCs, PACs, atrial couplets    Current Functional Status:  Occupation:  at [employer]  Recreation/Physical Activity: watching tv  ADL’s:No limitations  Towns: No limitations  Home exercise: none  Other; always staying active at home        SMART Exercise Goals:   10% improvement in functional capacity based on max METs achieved in initial fitness assessment  improved DASI score by 10%  increased exercise capacity by 40% based on peak METs tolerated in cardiac rehab exercise session  maintain > 150 minutes per week of moderate intensity exercise    Patient Specific EXERCISE GOALS:       Start doing cardiovascular exercise   Add home exercise    Functional Capacity Screening Tool:  [facility] Activity Status Index:  8.23 METs      PSYCHOSOCIAL ASSESSMENT:    Date of last Assessment:  7/22/24  Depression screening:  PHQ-9 = 0    Interpretation:  0 =No Depression  Anxiety  "screening:  SHAWN-7 = 1    Interpretation: 0-4  = Not anxious    Pt self-report of depression and anxiety   Patient reports they are coping well with good social support and denies depression or anxiety    Self-reported stress level:  5   Stressors:  father in 95, erica  Stress Management Tools: exercise, enjoy a hobby, and spend time with family    SMART Psychosocial Goals:     Reduce perceived stress to 1-3/10, Overall Health in [facility] Score < 3, and Change in Health in [facility] Score < 3     Patient Specific PSYCHOCOSOCIAL GOALS:    Stress management techniques  Decrease stress were possible    Quality of Life Screen:  (Higher score indicates disease impact on QOL)  [facility] COOP score: 17/45     Social Support:   spouse  Community/Social Activities:      Psychosocial Assessment as it relates to rehabilitation:   Patient denies issues with his/her family or home life that may affect their rehabilitation efforts.       NUTRITION ASSESSMENT:    Initial Weight:  190.6  Current Weight:     Height:   Ht Readings from Last 1 Encounters:   06/25/24 5' 9\" (1.753 m)       Rate Your Plate Score: 60/72    Diabetes: N/A  A1c: 6.0    last measured: 5/4/24    Lipid management: Discussed diet and lipid management and Last lipid profile 5/4/24  Chol 136  TRG 94  HDL 27  LDL 90    Current Dietary Habits:  Eating more fish  Eating smaller portions  1-2 x month red meat  No soda   Drinking lots of water     SMART Nutrition Goals:   HDL >40, Improved Rate Your Plate score  >58, eat 5 or more servings of fruits and vegetables a day, eat no more than 6oz of meat per day, eat meatless meals twice a week or more, choose healthy desserts and sweets such as blanca food cake or  fruit, never add salt to food when cooking or at the table, and choose low sugar desserts and sweets    Patient Specific NUTRITION GOALS:     1. Continue with heart healthy diet   2. Don't add salt to foods   3. Increase fruits and veggies    Drug/Alcohol " Use:   No      OTHER CORE COMPONENT ASSESSMENT:    Tobacco Use:     Pt quit 2017   and has abstained    Anginal Symptoms:   chest pain   NTG use: Reviewed Proper use, was unaware prescription was ordered    SMART Goals:   consistent, controlled resting BP < 130/80, medication compliance, abstain from smoking, and reduced angina    Patient Specific CORE COMPONENT GOALS:     NTG from pharmacy  Start carrying  Monitor BP  Limit sodium intake to help with BP control      INDIVIDUALIZED TREATMENT PLAN      EXERCISE GOALS and PLAN      Progress toward Exercise goals:   Reviewed Pt goals and determined plan of care, agreeable to start CR 3x/week    Exercise Intervention:    education on home exercise guidelines, home exercise 30+ mins 2 days opposite CR, and Group class: Risk Factors for Heart Disease    The patient was counseled on exercise guidelines to achieve a minimum of 150 mins/wk of moderate intensity (RPE 4-6)   exercise and encouraged to add 1-2 days of exercise on opposite days of cardiac rehab as tolerated.     Current Aerobic Exercise Prescription:      Frequency: 3 days/week   Supplement with home exercise 2+ days/wk as tolerated       Minutes: 20 - 40         METS: 2.5-4.0            HR: RHR +30-40bpm   RPE: 4-6         Modalities: Treadmill, UBE, NuStep, and Recumbent bike     Exercise workloads will be progressed gradually as tolerated, within limits of patient's ability, and according to the patient's   response to the exercise program.      Aerobic Exercise Prescription Plan for Progression   Frequency: 3 days/week of cardiac rehab       Supplement with home exercise 2+ days/wk as tolerated    Minutes: 40       >150 mins/wk of moderate intensity exercise   METS: 3-4.5   HR: RHR +30-40bpm     RPE: 4-6   Modalities: Treadmill, Airdyne bike, Lifecycle, and NuStep    Strength trainin-3 days / week  12-15 repetitions  1-2 sets per modality   Will be added following 2-3 weeks of monitored exercise  sessions   Modalities: Leg Press, Chest Press, Pull Downs, Arm Curl, and Seated Row    Home Exercise: none    Exercise Education: benefit of exercise for CAD risk factors, home exercise guidelines, AHA guidelines to achieve >150 mins/wk of moderate exercise, and RPE scale     Readiness to change: Preparation:  (Getting ready to change)       NUTRITION GOALS AND PLAN      Nutritional   Reviewed patient's Rate your Plate. Discussed key elements of heart healthy eating. Reviewed patient goals for dietary modifications and their clinical implications.  Reviewed most recent lipid profile.     Patient's progress toward Nutrition goals:    Reviewed Pt goals and determined plan of care      Nutrition Intervention:   group class: Reading Food Labels, group Class: Heart Healthy Eating, increase daily intake of fruits and vegetables, limit meat intake to less than 6oz per day, eat more meatless meals, choose desserts such as fruit, blanca food cake, low-fat or fat-free sweets, never/rarely add salt to food when cooking or at the table, and choose low sugar desserts and sweets    Measurable goals were based Rate Your Plate Dietary Self-Assessment. These are the areas in which the patient could score higher on the assessment.  Goals include recommendations for a heart healthy diet based on American Heart Association.    Nutrition Education:   low sodium diet  maintaining hydration  nutrition for  lipid management    Readiness to change: Action:  (Changing behavior)      PSYCHOSOCIAL GOALS AND PLAN    Psychosocial Assessment as it relates to rehabilitation:   Patient denies issues with his/her family or home life that may affect their rehabilitation efforts.     Patient's progress toward Psychosocial goals:    Reviewed Pt goals and determined plan of care    Psychosocial Intervention:   Class: Stress and Your Health, Class: Relaxation, Practice relaxation techniques, Keep a positive mindset, and Enjoy family    Psychosocial  Education: benefits of a positive support system and stress management techniques    Information to utilize [employer] was provided as well as contact information for counseling through  Behavioral [facility] and group psychotherapy groups available.    Readiness to change: Preparation:  (Getting ready to change)       OTHER CORE COMPONENTS GOALS and PLAN      Blood Pressure will be monitored throughout the program and cardiologist will be notified of elevated trends.    Pt will be encouraged to monitor home BP if advised by cardiologist.    Tobacco Intervention:   Pt quit 2017 and has abstained since quitting.      Progress toward Core Component goals:   Reviewed Pt goals and determined plan of care, will continue to monitor BP    Other Core Components Intervention:   group class: Understanding Heart Disease, group class: Common Heart Medications, medication compliance, engage in regular exercise, check labels for sodium content, and monitor home BP    Group and Individual Education:  understanding high blood pressure and it's relationship to CAD, components of blood pressure management, and proper use of sublingual NTG    Readiness to change: Action:  (Changing behavior)

## 2024-07-25 ENCOUNTER — TELEPHONE (OUTPATIENT)
Dept: CARDIOLOGY CLINIC | Facility: CLINIC | Age: 64
End: 2024-07-25

## 2024-07-25 ENCOUNTER — CLINICAL SUPPORT (OUTPATIENT)
Dept: CARDIAC REHAB | Facility: CLINIC | Age: 64
End: 2024-07-25
Payer: COMMERCIAL

## 2024-07-25 DIAGNOSIS — Z95.820 S/P ANGIOPLASTY WITH STENT: Primary | ICD-10-CM

## 2024-07-25 DIAGNOSIS — I25.118 CORONARY ARTERY DISEASE OF NATIVE ARTERY OF NATIVE HEART WITH STABLE ANGINA PECTORIS (HCC): ICD-10-CM

## 2024-07-25 PROCEDURE — 93798 PHYS/QHP OP CAR RHAB W/ECG: CPT

## 2024-07-25 NOTE — TELEPHONE ENCOUNTER
Pt needs the [facility]itro[facility]in [facility] due to not picking [facility].  He needs it for Cardiac Rehab.

## 2024-07-26 ENCOUNTER — CLINICAL SUPPORT (OUTPATIENT)
Dept: CARDIAC REHAB | Facility: CLINIC | Age: 64
End: 2024-07-26
Payer: COMMERCIAL

## 2024-07-26 DIAGNOSIS — Z95.820 S/P ANGIOPLASTY WITH STENT: Primary | ICD-10-CM

## 2024-07-26 PROCEDURE — 93798 PHYS/QHP OP CAR RHAB W/ECG: CPT

## 2024-07-26 RX ORDER — NITROGLYCERIN 0.4 MG/1
0.4 TABLET SUBLINGUAL
Qty: 25 TABLET | Refills: 1 | Status: SHIPPED | OUTPATIENT
Start: 2024-07-26

## 2024-07-30 ENCOUNTER — CLINICAL SUPPORT (OUTPATIENT)
Dept: CARDIAC REHAB | Facility: CLINIC | Age: 64
End: 2024-07-30
Payer: COMMERCIAL

## 2024-07-30 DIAGNOSIS — Z95.820 S/P ANGIOPLASTY WITH STENT: Primary | ICD-10-CM

## 2024-07-30 PROCEDURE — 93798 PHYS/QHP OP CAR RHAB W/ECG: CPT

## 2024-08-01 ENCOUNTER — CLINICAL SUPPORT (OUTPATIENT)
Dept: CARDIAC REHAB | Facility: CLINIC | Age: 64
End: 2024-08-01
Payer: COMMERCIAL

## 2024-08-01 DIAGNOSIS — Z95.820 S/P ANGIOPLASTY WITH STENT: Primary | ICD-10-CM

## 2024-08-01 PROCEDURE — 93798 PHYS/QHP OP CAR RHAB W/ECG: CPT

## 2024-08-02 ENCOUNTER — CLINICAL SUPPORT (OUTPATIENT)
Dept: CARDIAC REHAB | Facility: CLINIC | Age: 64
End: 2024-08-02
Payer: COMMERCIAL

## 2024-08-02 DIAGNOSIS — Z95.820 S/P ANGIOPLASTY WITH STENT: Primary | ICD-10-CM

## 2024-08-02 PROCEDURE — 93798 PHYS/QHP OP CAR RHAB W/ECG: CPT

## 2024-08-06 ENCOUNTER — CLINICAL SUPPORT (OUTPATIENT)
Dept: CARDIAC REHAB | Facility: CLINIC | Age: 64
End: 2024-08-06
Payer: COMMERCIAL

## 2024-08-06 DIAGNOSIS — Z95.820 S/P ANGIOPLASTY WITH STENT: Primary | ICD-10-CM

## 2024-08-06 PROCEDURE — 93798 PHYS/QHP OP CAR RHAB W/ECG: CPT

## 2024-08-08 ENCOUNTER — CLINICAL SUPPORT (OUTPATIENT)
Dept: CARDIAC REHAB | Facility: CLINIC | Age: 64
End: 2024-08-08
Payer: COMMERCIAL

## 2024-08-08 DIAGNOSIS — Z95.820 S/P ANGIOPLASTY WITH STENT: Primary | ICD-10-CM

## 2024-08-08 PROCEDURE — 93798 PHYS/QHP OP CAR RHAB W/ECG: CPT

## 2024-08-09 ENCOUNTER — CLINICAL SUPPORT (OUTPATIENT)
Dept: CARDIAC REHAB | Facility: CLINIC | Age: 64
End: 2024-08-09
Payer: COMMERCIAL

## 2024-08-09 DIAGNOSIS — I25.118 CORONARY ARTERY DISEASE OF NATIVE ARTERY OF NATIVE HEART WITH STABLE ANGINA PECTORIS (HCC): ICD-10-CM

## 2024-08-09 DIAGNOSIS — Z95.820 S/P ANGIOPLASTY WITH STENT: Primary | ICD-10-CM

## 2024-08-09 PROCEDURE — 93798 PHYS/QHP OP CAR RHAB W/ECG: CPT

## 2024-08-11 RX ORDER — NITROGLYCERIN 0.4 MG/1
0.4 TABLET SUBLINGUAL
Qty: 25 TABLET | Refills: 0 | Status: SHIPPED | OUTPATIENT
Start: 2024-08-11

## 2024-08-13 ENCOUNTER — CLINICAL SUPPORT (OUTPATIENT)
Dept: CARDIAC REHAB | Facility: CLINIC | Age: 64
End: 2024-08-13
Payer: COMMERCIAL

## 2024-08-13 DIAGNOSIS — Z95.820 S/P ANGIOPLASTY WITH STENT: Primary | ICD-10-CM

## 2024-08-13 PROCEDURE — 93798 PHYS/QHP OP CAR RHAB W/ECG: CPT

## 2024-08-15 ENCOUNTER — CLINICAL SUPPORT (OUTPATIENT)
Dept: CARDIAC REHAB | Facility: CLINIC | Age: 64
End: 2024-08-15
Payer: COMMERCIAL

## 2024-08-15 DIAGNOSIS — Z95.820 S/P ANGIOPLASTY WITH STENT: Primary | ICD-10-CM

## 2024-08-15 PROCEDURE — 93798 PHYS/QHP OP CAR RHAB W/ECG: CPT

## 2024-08-16 ENCOUNTER — APPOINTMENT (OUTPATIENT)
Dept: CARDIAC REHAB | Facility: CLINIC | Age: 64
End: 2024-08-16
Payer: COMMERCIAL

## 2024-08-19 ENCOUNTER — OFFICE VISIT (OUTPATIENT)
Dept: FAMILY MEDICINE CLINIC | Facility: CLINIC | Age: 64
End: 2024-08-19
Payer: COMMERCIAL

## 2024-08-19 VITALS
DIASTOLIC BLOOD PRESSURE: 78 MMHG | BODY MASS INDEX: 28.29 KG/M2 | WEIGHT: 191 LBS | SYSTOLIC BLOOD PRESSURE: 122 MMHG | HEART RATE: 61 BPM | OXYGEN SATURATION: 99 % | HEIGHT: 69 IN

## 2024-08-19 DIAGNOSIS — Z79.899 OTHER LONG TERM (CURRENT) DRUG THERAPY: ICD-10-CM

## 2024-08-19 DIAGNOSIS — Z95.820 S/P ANGIOPLASTY WITH STENT: ICD-10-CM

## 2024-08-19 DIAGNOSIS — E61.1 IRON DEFICIENCY: Primary | ICD-10-CM

## 2024-08-19 DIAGNOSIS — I10 PRIMARY HYPERTENSION: ICD-10-CM

## 2024-08-19 DIAGNOSIS — E55.9 VITAMIN D DEFICIENCY: ICD-10-CM

## 2024-08-19 DIAGNOSIS — I25.118 CORONARY ARTERY DISEASE OF NATIVE ARTERY OF NATIVE HEART WITH STABLE ANGINA PECTORIS (HCC): ICD-10-CM

## 2024-08-19 PROCEDURE — 99214 OFFICE O/P EST MOD 30 MIN: CPT | Performed by: FAMILY MEDICINE

## 2024-08-19 NOTE — PROGRESS NOTES
Ambulatory Visit  Name: Ramon Mora      : 1960      MRN: [id number]  Encounter Provider: Herbetr Garcia MD  Encounter Date: 2024   Encounter department: [facility]    Assessment & Plan  Iron deficiency    Orders:    Iron Panel (Includes Ferritin, Iron Sat%, Iron, and TIBC); Future    Basic metabolic panel; Future    Vitamin D deficiency    Orders:    Vitamin D 25 hydroxy; Future    Basic metabolic panel; Future    Other long term (current) drug therapy    Orders:    Hemoglobin A1C; Future    Basic metabolic panel; Future    S/P angioplasty with stent    Orders:    Basic metabolic panel; Future    Primary hypertension    Orders:    Basic metabolic panel; Future    Coronary artery disease of native artery of native heart with stable angina pectoris (HCC)    Orders:    Basic metabolic panel; Future       Assessment & Plan  1. Cardiac condition.  His blood pressure readings are within the normal range, and his weight has shown a slight decrease. The fatigue he experiences is likely a side effect of metoprolol, which is necessary for his treatment. His current medication regimen includes clopidogrel, amlodipine, metoprolol, rosuvastatin, and aspirin. He has a cholesterol test scheduled for 2024. His previous lab results indicated low vitamin D levels and slightly abnormal sugar and iron levels. Additional tests for sugar, vitamin D, and iron will be ordered during his next blood work for the cardiologist. He was reassured that no cardiac damage has occurred.   Follow-up  He will follow up in 2025.        History of Present Illness     History of Present Illness  The patient presents for evaluation of multiple medical concerns. He is accompanied by an adult female.    He has been participating in rehabilitation following a stent placement. His next appointment with the cardiologist is scheduled for 2025. He reports experiencing chest pain, which he describes  "as heavy rather than sharp, during physical exertion such as climbing stairs or lifting objects. This pain prompts him to use nitroglycerin. He also mentions occasional fatigue.       Review of Systems   Constitutional:  Negative for activity change, appetite change and fever.   HENT:  Negative for congestion, nosebleeds and trouble swallowing.    Eyes:  Negative for itching.   Respiratory:  Negative for cough and chest tightness.    Cardiovascular:  Negative for chest pain and palpitations.   Gastrointestinal:  Negative for abdominal pain, constipation, diarrhea and nausea.   Endocrine: Negative for cold intolerance.   Genitourinary:  Negative for frequency.   Musculoskeletal:  Negative for gait problem and joint swelling.   Skin:  Negative for rash.   Allergic/Immunologic: Negative for immunocompromised state.   Neurological:  Negative for dizziness, tremors, seizures, syncope and headaches.   Psychiatric/Behavioral:  Negative for hallucinations and suicidal ideas.      Objective     /78   Pulse 61   Ht 5' 9\" (1.753 m)   Wt 86.6 kg (191 lb)   SpO2 99%   BMI 28.21 kg/m²     Physical Exam  Vital Signs  Patient's weight is 190.  Physical Exam  Vitals and nursing note reviewed.   Constitutional:       Appearance: He is well-developed.   HENT:      Head: Normocephalic and atraumatic.   Eyes:      Conjunctiva/sclera: Conjunctivae normal.      Pupils: Pupils are equal, round, and reactive to light.   Cardiovascular:      Rate and Rhythm: Normal rate and regular rhythm.      Heart sounds: Normal heart sounds.   Pulmonary:      Effort: Pulmonary effort is normal.      Breath sounds: Normal breath sounds. No wheezing or rales.   Abdominal:      General: Bowel sounds are normal. There is no distension.      Palpations: Abdomen is soft.      Tenderness: There is no abdominal tenderness.   Musculoskeletal:         General: No tenderness. Normal range of motion.      Cervical back: Normal range of motion and neck " supple.   Skin:     General: Skin is warm and dry.      Findings: No rash.   Neurological:      Mental Status: He is alert and oriented to person, place, and time.      Cranial Nerves: No cranial nerve deficit.      Sensory: No sensory deficit.      Coordination: Coordination normal.   Psychiatric:         Behavior: Behavior normal.         Thought Content: Thought content normal.         Judgment: Judgment normal.

## 2024-08-20 ENCOUNTER — CLINICAL SUPPORT (OUTPATIENT)
Dept: CARDIAC REHAB | Facility: CLINIC | Age: 64
End: 2024-08-20
Payer: COMMERCIAL

## 2024-08-20 DIAGNOSIS — Z95.820 S/P ANGIOPLASTY WITH STENT: Primary | ICD-10-CM

## 2024-08-20 PROCEDURE — 93798 PHYS/QHP OP CAR RHAB W/ECG: CPT

## 2024-08-20 NOTE — PROGRESS NOTES
CARDIAC REHABILITATION   ASSESSMENT AND INDIVIDUALIZED TREATMENT PLAN  30 DAY          Today's date: 2024   # of Exercise Sessions Completed: 12  Patient name: Ramon Mora      : 1960  Age: 63 y.o.       MRN: [id number]  Referring Physician: Fang Haddad DO  Cardiologist: Bhavin Umanzor MD  Provider: Javid  Clinician: Brett Day MS, CEP, EPC        Treatment is tailored to this patient's individual needs.  The ITP was reviewed with the patient and all questions were answered to their satisfaction.  Additional ITP documentation can be found electronically including daily and monthly exercise summaries, daily session notes with ECG summaries, education notes, daily medication reconciliation, and daily physician supervision.      Comments:     2024: Ramon has been attending rehab 3 days/week for 36-40 min at 2.4-3.4.Will add strength training during the next 30 days.    2024:Bahamian speaking, wife translating        Dx:   Encounter Diagnosis   Name Primary?    S/P angioplasty with stent Yes       Description of Diagnosis: patient had a stress echo on 24 for anginal symptoms that was abnormal. He was then scheduled for a cardiac cath on 24 which revealed LCx 65% stenosed s/p PCI with DAVIAN x1, prox RCA 45% stenosed, LAD mild-diffuse disease  Date of onset: 24 and 24  Other Cardiac History: none        ASSESSMENT    Medical History:   Past Medical History:   Diagnosis Date    Lyme disease        Family History:  Family History   Problem Relation Age of Onset    Lymphoma Mother     Melanoma Father     Prostate cancer Maternal Grandfather        Allergies:   Patient has no known allergies.    Current Medications:   Current Outpatient Medications   Medication Sig Dispense Refill    amLODIPine (NORVASC) 5 mg tablet Take 1 tablet (5 mg total) by mouth daily 30 tablet 5    aspirin (ECOTRIN LOW STRENGTH) 81 mg EC tablet Take 1 tablet (81 mg total) by mouth daily 30  tablet 12    clopidogrel (PLAVIX) 75 mg tablet Take 1 tablet (75 mg total) by mouth daily Do not start before June 14, 2024. 30 tablet 12    metoprolol succinate (TOPROL-XL) 25 mg 24 hr tablet Take 1 tablet (25 mg total) by mouth daily 90 tablet 3    nitroglycerin (NITROSTAT) 0.4 mg SL tablet Place 1 tablet (0.4 mg total) under the tongue every 5 (five) minutes as needed for chest pain 25 tablet 0    rosuvastatin (CRESTOR) 20 MG tablet Take 1 tablet (20 mg total) by mouth daily 90 tablet 0     No current facility-administered medications for this visit.       Medication compliance: Yes   Comments: Pt reports to be compliant with medications. Was unaware of NTG rx ordered by Adria DODD. Going to contact [facility] about getting    Physical Limitations: none    Fall Risk: Low   Comments: Ambulates with a steady gait with no assist device    Cultural needs: none      CAD Risk Factors:  Cholesterol: No  HTN: Yes  DM: No  Obesity: overweight   Inactivity: Yes      EXERCISE ASSESSMENT:     Initial Fitness Assessment: Submaximal TM ETT:  Resting:  BP: 126/76  HR: 60, Exercise:  BP: 156/76  HR: 86, METs:  3.1, ECG Summary: NSR with occ PVCs, PACs, atrial couplets, and Test terminated at:  RPE 6      ECG INTERPRETATION:  NSR with occ PVCs, PACs, atrial couplets    Current Functional Status:  Occupation:  at [employer]  Recreation/Physical Activity: watching tv  ADL’s:No limitations  [place]: No limitations  Home exercise: none  Other; always staying active at home        SMART Exercise Goals:   10% improvement in functional capacity based on max METs achieved in initial fitness assessment  improved DASI score by 10%  increased exercise capacity by 40% based on peak METs tolerated in cardiac rehab exercise session  maintain > 150 minutes per week of moderate intensity exercise    Patient Specific EXERCISE GOALS:       Start doing cardiovascular exercise   Add home exercise    Functional Capacity Screening  "Tool:  [facility] Activity Status Index:  8.23 METs      PSYCHOSOCIAL ASSESSMENT:    Date of last Assessment:  7/22/24  Depression screening:  PHQ-9 = 0    Interpretation:  0 =No Depression  Anxiety screening:  SHAWN-7 = 1    Interpretation: 0-4  = Not anxious    Pt self-report of depression and anxiety   Patient reports they are coping well with good social support and denies depression or anxiety    Self-reported stress level:  5   Stressors:  father in 95, erica  Stress Management Tools: exercise, enjoy a hobby, and spend time with family    SMART Psychosocial Goals:     Reduce perceived stress to 1-3/10, Overall Health in [facility] Score < 3, and Change in Health in [facility] Score < 3     Patient Specific PSYCHOCOSOCIAL GOALS:    Stress management techniques  Decrease stress were possible    Quality of Life Screen:  (Higher score indicates disease impact on QOL)  [facility] COOP score: 17/45     Social Support:   spouse  Community/Social Activities:      Psychosocial Assessment as it relates to rehabilitation:   Patient denies issues with his/her family or home life that may affect their rehabilitation efforts.       NUTRITION ASSESSMENT:    Initial Weight:  190.6  Current Weight: 189.4    Height:   Ht Readings from Last 1 Encounters:   08/19/24 5' 9\" (1.753 m)       Rate Your Plate Score: 60/72    Diabetes: N/A  A1c: 6.0    last measured: 5/4/24    Lipid management: Discussed diet and lipid management and Last lipid profile 5/4/24  Chol 136  TRG 94  HDL 27  LDL 90    Current Dietary Habits:  Eating more fish  Eating smaller portions  1-2 x month red meat  No soda   Drinking lots of water     SMART Nutrition Goals:   HDL >40, Improved Rate Your Plate score  >58, eat 5 or more servings of fruits and vegetables a day, eat no more than 6oz of meat per day, eat meatless meals twice a week or more, choose healthy desserts and sweets such as blanca food cake or  fruit, never add salt to food when cooking or at the table, and " choose low sugar desserts and sweets    Patient Specific NUTRITION GOALS:     1. Continue with heart healthy diet   2. Don't add salt to foods   3. Increase fruits and veggies    Drug/Alcohol Use:   No      OTHER CORE COMPONENT ASSESSMENT:    Tobacco Use:     Pt quit 2017   and has abstained    Anginal Symptoms:   chest pain   NTG use: Reviewed Proper use, was unaware prescription was ordered    SMART Goals:   consistent, controlled resting BP < 130/80, medication compliance, abstain from smoking, and reduced angina    Patient Specific CORE COMPONENT GOALS:     NTG from pharmacy  Start carrying  Monitor BP  Limit sodium intake to help with BP control      INDIVIDUALIZED TREATMENT PLAN      EXERCISE GOALS and PLAN      Blood pressure    Restin//80  Exercise:112//74  Recovery:100//70      Progress toward Exercise goals:   Reviewed Pt goals and determined plan of care, exercise for 36-40 min at 2.4-3.4 METS, decreased fatigue and increased strength and endurance    Exercise Intervention:    education on home exercise guidelines, home exercise 30+ mins 2 days opposite CR, and Group class: Risk Factors for Heart Disease    The patient was counseled on exercise guidelines to achieve a minimum of 150 mins/wk of moderate intensity (RPE 4-6)   exercise and encouraged to add 1-2 days of exercise on opposite days of cardiac rehab as tolerated.     Current Aerobic Exercise Prescription:      Frequency: 3 days/week   Supplement with home exercise 2+ days/wk as tolerated       Minutes: 36-40         METS: 2.4-3.4           HR:    RPE: 3-4         Modalities: Treadmill, UBE, NuStep, and Recumbent bike     Exercise workloads will be progressed gradually as tolerated, within limits of patient's ability, and according to the patient's   response to the exercise program.      Aerobic Exercise Prescription Plan for Progression   Frequency: 3 days/week of cardiac rehab       Supplement with home  exercise 2+ days/wk as tolerated    Minutes: 40-45       >150 mins/wk of moderate intensity exercise   METS:2.7-3.7   HR: RHR +30-40bpm     RPE: 4-6   Modalities: Treadmill, Airdyne bike, Lifecycle, and NuStep    Strength trainin-3 days / week  12-15 repetitions  1-2 sets per modality   Will be added following 2-3 weeks of monitored exercise sessions   Modalities: Leg Press, Chest Press, Pull Downs, Arm Curl, and Seated Row    Home Exercise: none    Exercise Education: benefit of exercise for CAD risk factors, home exercise guidelines, AHA guidelines to achieve >150 mins/wk of moderate exercise, and RPE scale     Readiness to change: Preparation:  (Getting ready to change)       NUTRITION GOALS AND PLAN      Nutritional   Reviewed patient's Rate your Plate. Discussed key elements of heart healthy eating. Reviewed patient goals for dietary modifications and their clinical implications.  Reviewed most recent lipid profile.     Patient's progress toward Nutrition goals:    Reviewed Pt goals and determined plan of care has not been eating pork or fried food. Most of the food is cooked in the oven or air fryer      Nutrition Intervention:   group class: Reading Food Labels, group Class: Heart Healthy Eating, increase daily intake of fruits and vegetables, limit meat intake to less than 6oz per day, eat more meatless meals, choose desserts such as fruit, blanca food cake, low-fat or fat-free sweets, never/rarely add salt to food when cooking or at the table, and choose low sugar desserts and sweets    Measurable goals were based Rate Your Plate Dietary Self-Assessment. These are the areas in which the patient could score higher on the assessment.  Goals include recommendations for a heart healthy diet based on American Heart Association.    Nutrition Education:   low sodium diet  maintaining hydration  nutrition for  lipid management  group class: Heart Healthy Eating  group class:  Label Reading    Readiness to  change: Action:  (Changing behavior)      PSYCHOSOCIAL GOALS AND PLAN    Psychosocial Assessment as it relates to rehabilitation:   Patient denies issues with his/her family or home life that may affect their rehabilitation efforts.     Patient's progress toward Psychosocial goals:    Reviewed Pt goals and determined plan of careno stress or depression     Psychosocial Intervention:   Class: Stress and Your Health, Class: Relaxation, Practice relaxation techniques, Keep a positive mindset, and Enjoy family    Psychosocial Education: benefits of a positive support system and stress management techniques    Information to utilize [employer] was provided as well as contact information for counseling through  Behavioral Health and group psychotherapy groups available.    Readiness to change: Preparation:  (Getting ready to change)       OTHER CORE COMPONENTS GOALS and PLAN      Blood Pressure will be monitored throughout the program and cardiologist will be notified of elevated trends.    Pt will be encouraged to monitor home BP if advised by cardiologist.    Tobacco Intervention:   Pt quit 2017 and has abstained since quitting.      Progress toward Core Component goals:   Reviewed Pt goals and determined plan of care, will continue to monitor BP    Other Core Components Intervention:   group class: Understanding Heart Disease, group class: Common Heart Medications, medication compliance, engage in regular exercise, check labels for sodium content, and monitor home BP    Group and Individual Education:  understanding high blood pressure and it's relationship to CAD, components of blood pressure management, proper use of sublingual NTG, group class: Understanding Heart Disease, and group class:  Common Heart Medications    Readiness to change: Action:  (Changing behavior)

## 2024-08-22 ENCOUNTER — CLINICAL SUPPORT (OUTPATIENT)
Dept: CARDIAC REHAB | Facility: CLINIC | Age: 64
End: 2024-08-22
Payer: COMMERCIAL

## 2024-08-22 DIAGNOSIS — Z95.820 S/P ANGIOPLASTY WITH STENT: Primary | ICD-10-CM

## 2024-08-22 PROCEDURE — 93798 PHYS/QHP OP CAR RHAB W/ECG: CPT

## 2024-08-23 ENCOUNTER — CLINICAL SUPPORT (OUTPATIENT)
Dept: CARDIAC REHAB | Facility: CLINIC | Age: 64
End: 2024-08-23
Payer: COMMERCIAL

## 2024-08-23 DIAGNOSIS — Z95.820 S/P ANGIOPLASTY WITH STENT: Primary | ICD-10-CM

## 2024-08-23 PROCEDURE — 93798 PHYS/QHP OP CAR RHAB W/ECG: CPT

## 2024-08-27 ENCOUNTER — CLINICAL SUPPORT (OUTPATIENT)
Dept: CARDIAC REHAB | Facility: CLINIC | Age: 64
End: 2024-08-27
Payer: COMMERCIAL

## 2024-08-27 DIAGNOSIS — Z95.820 S/P ANGIOPLASTY WITH STENT: Primary | ICD-10-CM

## 2024-08-27 PROCEDURE — 93798 PHYS/QHP OP CAR RHAB W/ECG: CPT

## 2024-08-29 ENCOUNTER — CLINICAL SUPPORT (OUTPATIENT)
Dept: CARDIAC REHAB | Facility: CLINIC | Age: 64
End: 2024-08-29
Payer: COMMERCIAL

## 2024-08-29 DIAGNOSIS — Z95.820 S/P ANGIOPLASTY WITH STENT: Primary | ICD-10-CM

## 2024-08-29 PROCEDURE — 93798 PHYS/QHP OP CAR RHAB W/ECG: CPT

## 2024-08-30 ENCOUNTER — APPOINTMENT (OUTPATIENT)
Dept: CARDIAC REHAB | Facility: CLINIC | Age: 64
End: 2024-08-30
Payer: COMMERCIAL

## 2024-09-03 ENCOUNTER — CLINICAL SUPPORT (OUTPATIENT)
Dept: CARDIAC REHAB | Facility: CLINIC | Age: 64
End: 2024-09-03
Payer: COMMERCIAL

## 2024-09-03 DIAGNOSIS — Z95.820 S/P ANGIOPLASTY WITH STENT: Primary | ICD-10-CM

## 2024-09-03 PROCEDURE — 93798 PHYS/QHP OP CAR RHAB W/ECG: CPT

## 2024-09-05 ENCOUNTER — APPOINTMENT (OUTPATIENT)
Dept: CARDIAC REHAB | Facility: CLINIC | Age: 64
End: 2024-09-05
Payer: COMMERCIAL

## 2024-09-06 ENCOUNTER — APPOINTMENT (OUTPATIENT)
Dept: CARDIAC REHAB | Facility: CLINIC | Age: 64
End: 2024-09-06
Payer: COMMERCIAL

## 2024-09-08 DIAGNOSIS — R94.39 ABNORMAL STRESS TEST: ICD-10-CM

## 2024-09-08 DIAGNOSIS — R07.9 CHEST PAIN, UNSPECIFIED TYPE: ICD-10-CM

## 2024-09-09 RX ORDER — ROSUVASTATIN CALCIUM 20 MG/1
20 TABLET, COATED ORAL DAILY
Qty: 90 TABLET | Refills: 1 | Status: SHIPPED | OUTPATIENT
Start: 2024-09-09

## 2024-09-09 RX ORDER — ASPIRIN 81 MG/1
81 TABLET ORAL DAILY
Qty: 90 TABLET | Refills: 1 | Status: SHIPPED | OUTPATIENT
Start: 2024-09-09 | End: 2024-09-20 | Stop reason: SDUPTHER

## 2024-09-10 ENCOUNTER — CLINICAL SUPPORT (OUTPATIENT)
Dept: CARDIAC REHAB | Facility: CLINIC | Age: 64
End: 2024-09-10
Payer: COMMERCIAL

## 2024-09-10 DIAGNOSIS — Z95.820 S/P ANGIOPLASTY WITH STENT: Primary | ICD-10-CM

## 2024-09-10 PROCEDURE — 93798 PHYS/QHP OP CAR RHAB W/ECG: CPT

## 2024-09-12 ENCOUNTER — CLINICAL SUPPORT (OUTPATIENT)
Dept: CARDIAC REHAB | Facility: CLINIC | Age: 64
End: 2024-09-12
Payer: COMMERCIAL

## 2024-09-12 DIAGNOSIS — Z95.820 S/P ANGIOPLASTY WITH STENT: Primary | ICD-10-CM

## 2024-09-12 PROCEDURE — 93798 PHYS/QHP OP CAR RHAB W/ECG: CPT

## 2024-09-13 ENCOUNTER — CLINICAL SUPPORT (OUTPATIENT)
Dept: CARDIAC REHAB | Facility: CLINIC | Age: 64
End: 2024-09-13
Payer: COMMERCIAL

## 2024-09-13 DIAGNOSIS — Z95.820 S/P ANGIOPLASTY WITH STENT: Primary | ICD-10-CM

## 2024-09-13 PROCEDURE — 93798 PHYS/QHP OP CAR RHAB W/ECG: CPT

## 2024-09-15 ENCOUNTER — APPOINTMENT (OUTPATIENT)
Dept: LAB | Facility: CLINIC | Age: 64
End: 2024-09-15
Payer: COMMERCIAL

## 2024-09-15 DIAGNOSIS — E61.1 IRON DEFICIENCY: ICD-10-CM

## 2024-09-15 DIAGNOSIS — E55.9 VITAMIN D DEFICIENCY: ICD-10-CM

## 2024-09-15 DIAGNOSIS — Z79.899 OTHER LONG TERM (CURRENT) DRUG THERAPY: ICD-10-CM

## 2024-09-15 DIAGNOSIS — I10 PRIMARY HYPERTENSION: ICD-10-CM

## 2024-09-15 DIAGNOSIS — I25.118 CORONARY ARTERY DISEASE OF NATIVE ARTERY OF NATIVE HEART WITH STABLE ANGINA PECTORIS (HCC): ICD-10-CM

## 2024-09-15 DIAGNOSIS — E78.5 DYSLIPIDEMIA: ICD-10-CM

## 2024-09-15 DIAGNOSIS — Z95.820 S/P ANGIOPLASTY WITH STENT: ICD-10-CM

## 2024-09-15 LAB
25(OH)D3 SERPL-MCNC: 16.3 NG/ML (ref 30–100)
ANION GAP SERPL CALCULATED.3IONS-SCNC: 6 MMOL/L (ref 4–13)
BUN SERPL-MCNC: 24 MG/DL (ref 5–25)
CALCIUM SERPL-MCNC: 8.8 MG/DL (ref 8.4–10.2)
CHLORIDE SERPL-SCNC: 105 MMOL/L (ref 96–108)
CHOLEST SERPL-MCNC: 113 MG/DL
CO2 SERPL-SCNC: 27 MMOL/L (ref 21–32)
CREAT SERPL-MCNC: 0.73 MG/DL (ref 0.6–1.3)
EST. AVERAGE GLUCOSE BLD GHB EST-MCNC: 126 MG/DL
FERRITIN SERPL-MCNC: 62 NG/ML (ref 24–336)
GFR SERPL CREATININE-BSD FRML MDRD: 99 ML/MIN/1.73SQ M
GLUCOSE P FAST SERPL-MCNC: 109 MG/DL (ref 65–99)
HBA1C MFR BLD: 6 %
HDLC SERPL-MCNC: 41 MG/DL
IRON SATN MFR SERPL: 21 % (ref 15–50)
IRON SERPL-MCNC: 87 UG/DL (ref 50–212)
LDLC SERPL CALC-MCNC: 54 MG/DL (ref 0–100)
POTASSIUM SERPL-SCNC: 4.3 MMOL/L (ref 3.5–5.3)
SODIUM SERPL-SCNC: 138 MMOL/L (ref 135–147)
TIBC SERPL-MCNC: 414 UG/DL (ref 250–450)
TRIGL SERPL-MCNC: 92 MG/DL
UIBC SERPL-MCNC: 327 UG/DL (ref 155–355)

## 2024-09-15 PROCEDURE — 36415 COLL VENOUS BLD VENIPUNCTURE: CPT

## 2024-09-15 PROCEDURE — 80048 BASIC METABOLIC PNL TOTAL CA: CPT

## 2024-09-15 PROCEDURE — 82728 ASSAY OF FERRITIN: CPT

## 2024-09-15 PROCEDURE — 83540 ASSAY OF IRON: CPT

## 2024-09-15 PROCEDURE — 82306 VITAMIN D 25 HYDROXY: CPT

## 2024-09-15 PROCEDURE — 80061 LIPID PANEL: CPT

## 2024-09-15 PROCEDURE — 83550 IRON BINDING TEST: CPT

## 2024-09-15 PROCEDURE — 83036 HEMOGLOBIN GLYCOSYLATED A1C: CPT

## 2024-09-16 ENCOUNTER — TELEPHONE (OUTPATIENT)
Dept: FAMILY MEDICINE CLINIC | Facility: CLINIC | Age: 64
End: 2024-09-16

## 2024-09-16 NOTE — TELEPHONE ENCOUNTER
----- Message from Herbert Garcia MD sent at 9/16/2024  5:45 AM EDT -----  Please take vitamin D 5000 twice a day for 3 months then once a day after that  The A1c is the same for the last 1 year but it is elevated please be mindful of the carbs and sweets  Kidneys are good  The iron is good please keep taking a supplement if you have been

## 2024-09-17 ENCOUNTER — CLINICAL SUPPORT (OUTPATIENT)
Dept: CARDIAC REHAB | Facility: CLINIC | Age: 64
End: 2024-09-17
Payer: COMMERCIAL

## 2024-09-17 DIAGNOSIS — Z95.820 S/P ANGIOPLASTY WITH STENT: Primary | ICD-10-CM

## 2024-09-17 PROCEDURE — 93798 PHYS/QHP OP CAR RHAB W/ECG: CPT

## 2024-09-17 NOTE — PROGRESS NOTES
CARDIAC REHABILITATION   ASSESSMENT AND INDIVIDUALIZED TREATMENT PLAN  60 DAY          Today's date: 2024   # of Exercise Sessions Completed: 21  Patient name: Ramon Mora      : 1960  Age: 63 y.o.       MRN: [id number]  Referring Physician: Fang Haddad DO  Cardiologist: Bhavin Umanzor MD  Provider: Javid  Clinician: Brett Day MS, CEP, EPC        Treatment is tailored to this patient's individual needs.  The ITP was reviewed with the patient and all questions were answered to their satisfaction.  Additional ITP documentation can be found electronically including daily and monthly exercise summaries, daily session notes with ECG summaries, education notes, daily medication reconciliation, and daily physician supervision.      Comments:     2024 Ramon continues to attend rehab regularly. He states he has been more active in his day to day activities. He also states increase energy and strength.    2024: Ramon has been attending rehab 3 days/week for 36-40 min at 2.4-3.4.Will add strength training during the next 30 days.    2024:Lithuanian speaking, wife translating        Dx:   No diagnosis found.      Description of Diagnosis: patient had a stress echo on 24 for anginal symptoms that was abnormal. He was then scheduled for a cardiac cath on 24 which revealed LCx 65% stenosed s/p PCI with DAVIAN x1, prox RCA 45% stenosed, LAD mild-diffuse disease  Date of onset: 24 and 24  Other Cardiac History: none        ASSESSMENT    Medical History:   Past Medical History:   Diagnosis Date    Lyme disease        Family History:  Family History   Problem Relation Age of Onset    Lymphoma Mother     Melanoma Father     Prostate cancer Maternal Grandfather        Allergies:   Patient has no known allergies.    Current Medications:   Current Outpatient Medications   Medication Sig Dispense Refill    amLODIPine (NORVASC) 5 mg tablet Take 1 tablet (5 mg total) by mouth  daily 30 tablet 5    aspirin (ECOTRIN LOW STRENGTH) 81 mg EC tablet Take 1 tablet (81 mg total) by mouth daily 90 tablet 1    clopidogrel (PLAVIX) 75 mg tablet Take 1 tablet (75 mg total) by mouth daily Do not start before June 14, 2024. 30 tablet 12    metoprolol succinate (TOPROL-XL) 25 mg 24 hr tablet Take 1 tablet (25 mg total) by mouth daily 90 tablet 3    nitroglycerin (NITROSTAT) 0.4 mg SL tablet Place 1 tablet (0.4 mg total) under the tongue every 5 (five) minutes as needed for chest pain 25 tablet 0    rosuvastatin (CRESTOR) 20 MG tablet Take 1 tablet (20 mg total) by mouth daily 90 tablet 1     No current facility-administered medications for this visit.       Medication compliance: Yes   Comments: Pt reports to be compliant with medications. Was unaware of NTG rx ordered by Adria DODD. Going [facility] about getting    Physical Limitations: none    Fall Risk: Low   Comments: Ambulates with a steady gait with no assist device    Cultural needs: none      CAD Risk Factors:  Cholesterol: No  HTN: Yes  DM: No  Obesity: overweight   Inactivity: Yes      EXERCISE ASSESSMENT:     Initial Fitness Assessment: Submaximal TM ETT:  Resting:  BP: 126/76  HR: 60, Exercise:  BP: 156/76  HR: 86, METs:  3.1, ECG Summary: NSR with occ PVCs, PACs, atrial couplets, and Test terminated at:  RPE 6      ECG INTERPRETATION:  NSR with occ PVCs, PACs, atrial couplets    Current Functional Status:  Occupation:  at [employer]  Recreation/Physical Activity: watching tv  ADL’s:No limitations  [place]: No limitations  Home exercise: none  Other; always staying active at home        SMART Exercise Goals:   10% improvement in functional capacity based on max METs achieved in initial fitness assessment  improved DASI score by 10%  increased exercise capacity by 40% based on peak METs tolerated in cardiac rehab exercise session  maintain > 150 minutes per week of moderate intensity exercise    Patient Specific  "EXERCISE GOALS:       Start doing cardiovascular exercise   Add home exercise    Functional Capacity Screening Tool:  [facility] Activity Status Index:  8.23 METs      PSYCHOSOCIAL ASSESSMENT:    Date of last Assessment:  7/22/24  Depression screening:  PHQ-9 = 0    Interpretation:  0 =No Depression  Anxiety screening:  SHAWN-7 = 1    Interpretation: 0-4  = Not anxious    Pt self-report of depression and anxiety   Patient reports they are coping well with good social support and denies depression or anxiety    Self-reported stress level:  5   Stressors:  father in , erica  Stress Management Tools: exercise, enjoy a hobby, and spend time with family    SMART Psychosocial Goals:     Reduce perceived stress to 1-3/10, Overall Health in [facility] Score < 3, and Change in Health in [facility] Score < 3     Patient Specific PSYCHOCOSOCIAL GOALS:    Stress management techniques  Decrease stress were possible-met    Quality of Life Screen:  (Higher score indicates disease impact on QOL)  [facility] COOP score: 17/45     Social Support:   spouse  Community/Social Activities:      Psychosocial Assessment as it relates to rehabilitation:   Patient denies issues with his/her family or home life that may affect their rehabilitation efforts.       NUTRITION ASSESSMENT:    Initial Weight:  190.6  Current Weight: 192.8    Height:   Ht Readings from Last 1 Encounters:   08/19/24 5' 9\" (1.753 m)       Rate Your Plate Score: 60/72    Diabetes: N/A  A1c: 6.0    last measured: 9/15/2024    Lipid management: Discussed diet and lipid management and Last lipid profile 9/15/2024  Chol 113  TRG 92  HDL 41  LDL 54    Current Dietary Habits:  Eating more fish  Eating smaller portions  1-2 x month red meat  No soda   Drinking lots of water     SMART Nutrition Goals:   HDL >40, Improved Rate Your Plate score  >58, eat 5 or more servings of fruits and vegetables a day, eat no more than 6oz of meat per day, eat meatless meals twice a week or more, choose " healthy desserts and sweets such as blanca food cake or  fruit, never add salt to food when cooking or at the table, and choose low sugar desserts and sweets    Patient Specific NUTRITION GOALS:     1. Continue with heart healthy diet   2. Don't add salt to foods   3. Increase fruits and veggies    Drug/Alcohol Use:   No      OTHER CORE COMPONENT ASSESSMENT:    Tobacco Use:     Pt quit 2017   and has abstained    Anginal Symptoms:   chest pain   NTG use: Reviewed Proper use, was unaware prescription was ordered    SMART Goals:   consistent, controlled resting BP < 130/80, medication compliance, abstain from smoking, and reduced angina    Patient Specific CORE COMPONENT GOALS:     NTG from pharmacy-Met  Start carrying-Met  Monitor BP  Limit sodium intake to help with BP control      INDIVIDUALIZED TREATMENT PLAN      EXERCISE GOALS and PLAN      Blood pressure    Restin//80  Exercise:124//76  Recovery:110//72      Progress toward Exercise goals:   Reviewed Pt goals and determined plan of care, Goals not met: adding exercise outside of rehab.  , exercise for 40 min at 2.7-3.0 METS, decreased fatigue and increased strength and endurance    Exercise Intervention:    education on home exercise guidelines, home exercise 30+ mins 2 days opposite CR, and Group class: Risk Factors for Heart Disease    The patient was counseled on exercise guidelines to achieve a minimum of 150 mins/wk of moderate intensity (RPE 4-6)   exercise and encouraged to add 1-2 days of exercise on opposite days of cardiac rehab as tolerated.       Physician Prescribed Exercise    Current Aerobic Exercise Prescription:      Frequency: 3 days/week   Supplement with home exercise 2+ days/wk as tolerated       Minutes: 40         METS: 2.7-3.0           HR:    RPE: 3-4         Modalities: Treadmill, UBE, NuStep, and Recumbent bike     Exercise workloads will be progressed gradually as tolerated, within limits of  patient's ability, and according to the patient's   response to the exercise program.      Aerobic Exercise Prescription Plan for Progression   Frequency: 3 days/week of cardiac rehab       Supplement with home exercise 2+ days/wk as tolerated    Minutes: 40-45       >150 mins/wk of moderate intensity exercise   METS:3.0-4.0   HR: RHR +30-40bpm     RPE: 4-6   Modalities: Treadmill, Airdyne bike, Lifecycle, and NuStep    Strength trainin-3 days / week  12-15 repetitions  1-2 sets per modality   Will be added following 2-3 weeks of monitored exercise sessions   Modalities: Leg Press, Chest Press, Pull Downs, Arm Curl, and Seated Row    Home Exercise: none    Exercise Education: benefit of exercise for CAD risk factors, home exercise guidelines, AHA guidelines to achieve >150 mins/wk of moderate exercise, RPE scale, and Group class: Risk Factors for Heart Disease     Readiness to change: Preparation:  (Getting ready to change)       NUTRITION GOALS AND PLAN      Nutritional   Reviewed patient's Rate your Plate. Discussed key elements of heart healthy eating. Reviewed patient goals for dietary modifications and their clinical implications.  Reviewed most recent lipid profile.     Patient's progress toward Nutrition goals:    Reviewed Pt goals and determined plan of care has not been eating pork or fried food. Most of the food is cooked in the oven or air fryer,wt maintains under control       Nutrition Intervention:   increase daily intake of fruits and vegetables, limit meat intake to less than 6oz per day, eat more meatless meals, choose desserts such as fruit, blanca food cake, low-fat or fat-free sweets, never/rarely add salt to food when cooking or at the table, and choose low sugar desserts and sweets    Measurable goals were based Rate Your Plate Dietary Self-Assessment. These are the areas in which the patient could score higher on the assessment.  Goals include recommendations for a heart healthy diet based  on American Heart Association.    Nutrition Education:   low sodium diet  maintaining hydration  nutrition for  lipid management  healthy choices while dining out  group class: Heart Healthy Eating  group class:  Label Reading    Readiness to change: Action:  (Changing behavior)      PSYCHOSOCIAL GOALS AND PLAN    Psychosocial Assessment as it relates to rehabilitation:   Patient denies issues with his/her family or home life that may affect their rehabilitation efforts.     Patient's progress toward Psychosocial goals:    Reviewed Pt goals and determined plan of careno stress or depression, takes time outdoors to relax    Psychosocial Intervention:   Class: Stress and Your Health, Class: Relaxation, Practice relaxation techniques, Keep a positive mindset, and Enjoy family    Psychosocial Education: benefits of a positive support system and stress management techniques    Information to utilize [employer] was provided as well as contact information for counseling through  Behavioral Health and group psychotherapy groups available.    Readiness to change: Preparation:  (Getting ready to change)       OTHER CORE COMPONENTS GOALS and PLAN      Blood Pressure will be monitored throughout the program and cardiologist will be notified of elevated trends.    Pt will be encouraged to monitor home BP if advised by cardiologist.    Tobacco Intervention:   Pt quit 2017 and has abstained since quitting.      Progress toward Core Component goals:   Reviewed Pt goals and determined plan of care, will continue to monitor BP    Other Core Components Intervention:   group class: Understanding Heart Disease, group class: Common Heart Medications, medication compliance, engage in regular exercise, check labels for sodium content, and monitor home BP    Group and Individual Education:  understanding high blood pressure and it's relationship to CAD, components of blood pressure management, proper use of sublingual NTG, group class:  Understanding Heart Disease, and group class:  Common Heart Medications    Readiness to change: Action:  (Changing behavior)

## 2024-09-19 ENCOUNTER — CLINICAL SUPPORT (OUTPATIENT)
Dept: CARDIAC REHAB | Facility: CLINIC | Age: 64
End: 2024-09-19
Payer: COMMERCIAL

## 2024-09-19 DIAGNOSIS — Z95.820 S/P ANGIOPLASTY WITH STENT: Primary | ICD-10-CM

## 2024-09-19 PROCEDURE — 93798 PHYS/QHP OP CAR RHAB W/ECG: CPT

## 2024-09-20 ENCOUNTER — CLINICAL SUPPORT (OUTPATIENT)
Dept: CARDIAC REHAB | Facility: CLINIC | Age: 64
End: 2024-09-20
Payer: COMMERCIAL

## 2024-09-20 DIAGNOSIS — R94.39 ABNORMAL STRESS TEST: ICD-10-CM

## 2024-09-20 DIAGNOSIS — Z95.820 S/P ANGIOPLASTY WITH STENT: Primary | ICD-10-CM

## 2024-09-20 DIAGNOSIS — R07.9 CHEST PAIN, UNSPECIFIED TYPE: ICD-10-CM

## 2024-09-20 PROCEDURE — 93798 PHYS/QHP OP CAR RHAB W/ECG: CPT

## 2024-09-22 RX ORDER — ASPIRIN 81 MG/1
81 TABLET ORAL DAILY
Qty: 90 TABLET | Refills: 1 | Status: SHIPPED | OUTPATIENT
Start: 2024-09-22

## 2024-09-24 ENCOUNTER — CLINICAL SUPPORT (OUTPATIENT)
Dept: CARDIAC REHAB | Facility: CLINIC | Age: 64
End: 2024-09-24
Payer: COMMERCIAL

## 2024-09-24 DIAGNOSIS — Z95.820 S/P ANGIOPLASTY WITH STENT: Primary | ICD-10-CM

## 2024-09-24 PROCEDURE — 93798 PHYS/QHP OP CAR RHAB W/ECG: CPT

## 2024-09-26 ENCOUNTER — CLINICAL SUPPORT (OUTPATIENT)
Dept: CARDIAC REHAB | Facility: CLINIC | Age: 64
End: 2024-09-26
Payer: COMMERCIAL

## 2024-09-26 DIAGNOSIS — Z95.820 S/P ANGIOPLASTY WITH STENT: Primary | ICD-10-CM

## 2024-09-26 PROCEDURE — 93798 PHYS/QHP OP CAR RHAB W/ECG: CPT

## 2024-09-27 ENCOUNTER — CLINICAL SUPPORT (OUTPATIENT)
Dept: CARDIAC REHAB | Facility: CLINIC | Age: 64
End: 2024-09-27
Payer: COMMERCIAL

## 2024-09-27 DIAGNOSIS — Z95.820 S/P ANGIOPLASTY WITH STENT: Primary | ICD-10-CM

## 2024-09-27 PROCEDURE — 93798 PHYS/QHP OP CAR RHAB W/ECG: CPT

## 2024-10-01 ENCOUNTER — CLINICAL SUPPORT (OUTPATIENT)
Dept: CARDIAC REHAB | Facility: CLINIC | Age: 64
End: 2024-10-01
Payer: COMMERCIAL

## 2024-10-01 DIAGNOSIS — Z95.820 S/P ANGIOPLASTY WITH STENT: Primary | ICD-10-CM

## 2024-10-01 PROCEDURE — 93798 PHYS/QHP OP CAR RHAB W/ECG: CPT

## 2024-10-03 ENCOUNTER — CLINICAL SUPPORT (OUTPATIENT)
Dept: CARDIAC REHAB | Facility: CLINIC | Age: 64
End: 2024-10-03
Payer: COMMERCIAL

## 2024-10-03 DIAGNOSIS — Z95.820 S/P ANGIOPLASTY WITH STENT: Primary | ICD-10-CM

## 2024-10-03 PROCEDURE — 93798 PHYS/QHP OP CAR RHAB W/ECG: CPT

## 2024-10-04 ENCOUNTER — CLINICAL SUPPORT (OUTPATIENT)
Dept: CARDIAC REHAB | Facility: CLINIC | Age: 64
End: 2024-10-04
Payer: COMMERCIAL

## 2024-10-04 DIAGNOSIS — Z95.820 S/P ANGIOPLASTY WITH STENT: Primary | ICD-10-CM

## 2024-10-04 PROCEDURE — 93798 PHYS/QHP OP CAR RHAB W/ECG: CPT

## 2024-10-08 ENCOUNTER — CLINICAL SUPPORT (OUTPATIENT)
Dept: CARDIAC REHAB | Facility: CLINIC | Age: 64
End: 2024-10-08
Payer: COMMERCIAL

## 2024-10-08 DIAGNOSIS — Z95.820 S/P ANGIOPLASTY WITH STENT: ICD-10-CM

## 2024-10-08 DIAGNOSIS — Z95.820 S/P ANGIOPLASTY WITH STENT: Primary | ICD-10-CM

## 2024-10-08 PROCEDURE — 93798 PHYS/QHP OP CAR RHAB W/ECG: CPT

## 2024-10-09 RX ORDER — CLOPIDOGREL BISULFATE 75 MG/1
75 TABLET ORAL DAILY
Qty: 90 TABLET | Refills: 1 | Status: SHIPPED | OUTPATIENT
Start: 2024-10-09

## 2024-10-09 RX ORDER — AMLODIPINE BESYLATE 5 MG/1
5 TABLET ORAL DAILY
Qty: 90 TABLET | Refills: 1 | Status: SHIPPED | OUTPATIENT
Start: 2024-10-09

## 2024-10-10 ENCOUNTER — CLINICAL SUPPORT (OUTPATIENT)
Dept: CARDIAC REHAB | Facility: CLINIC | Age: 64
End: 2024-10-10
Payer: COMMERCIAL

## 2024-10-10 DIAGNOSIS — Z95.820 S/P ANGIOPLASTY WITH STENT: Primary | ICD-10-CM

## 2024-10-10 PROCEDURE — 93798 PHYS/QHP OP CAR RHAB W/ECG: CPT

## 2024-10-11 ENCOUNTER — CLINICAL SUPPORT (OUTPATIENT)
Dept: CARDIAC REHAB | Facility: CLINIC | Age: 64
End: 2024-10-11
Payer: COMMERCIAL

## 2024-10-11 DIAGNOSIS — Z95.820 S/P ANGIOPLASTY WITH STENT: Primary | ICD-10-CM

## 2024-10-11 PROCEDURE — 93798 PHYS/QHP OP CAR RHAB W/ECG: CPT

## 2024-10-15 ENCOUNTER — CLINICAL SUPPORT (OUTPATIENT)
Dept: CARDIAC REHAB | Facility: CLINIC | Age: 64
End: 2024-10-15
Payer: COMMERCIAL

## 2024-10-15 DIAGNOSIS — Z95.820 S/P ANGIOPLASTY WITH STENT: Primary | ICD-10-CM

## 2024-10-15 PROCEDURE — 93798 PHYS/QHP OP CAR RHAB W/ECG: CPT

## 2024-10-17 ENCOUNTER — CLINICAL SUPPORT (OUTPATIENT)
Dept: CARDIAC REHAB | Facility: CLINIC | Age: 64
End: 2024-10-17
Payer: COMMERCIAL

## 2024-10-17 DIAGNOSIS — Z95.820 S/P ANGIOPLASTY WITH STENT: Primary | ICD-10-CM

## 2024-10-17 PROCEDURE — 93798 PHYS/QHP OP CAR RHAB W/ECG: CPT

## 2024-10-18 ENCOUNTER — CLINICAL SUPPORT (OUTPATIENT)
Dept: CARDIAC REHAB | Facility: CLINIC | Age: 64
End: 2024-10-18
Payer: COMMERCIAL

## 2024-10-18 DIAGNOSIS — Z95.820 S/P ANGIOPLASTY WITH STENT: Primary | ICD-10-CM

## 2024-10-18 PROCEDURE — 93798 PHYS/QHP OP CAR RHAB W/ECG: CPT

## 2024-10-22 ENCOUNTER — CLINICAL SUPPORT (OUTPATIENT)
Dept: CARDIAC REHAB | Facility: CLINIC | Age: 64
End: 2024-10-22
Payer: COMMERCIAL

## 2024-10-22 DIAGNOSIS — Z95.820 S/P ANGIOPLASTY WITH STENT: Primary | ICD-10-CM

## 2024-10-22 PROCEDURE — 93798 PHYS/QHP OP CAR RHAB W/ECG: CPT

## 2024-10-22 NOTE — PROGRESS NOTES
See scanned exercise session detailed report    CARDIAC REHABILITATION   ASSESSMENT AND INDIVIDUALIZED TREATMENT PLAN  DISCHARGE            Today's date: 10/22/2024   # of Exercise Sessions Completed: 36  Patient name: Ramon Mora      : 1960  Age: 63 y.o.       MRN: [id number]  Referring Physician: Fang Haddad DO  Cardiologist: Bhavin Umanzor MD  Provider: Javid  Clinician: Brett Day MS, CEP, EPC        Treatment is tailored to this patient's individual needs.  The ITP was reviewed with the patient and all questions were answered to their satisfaction.  Additional ITP documentation can be found electronically including daily and monthly exercise summaries, daily session notes with ECG summaries, education notes, daily medication reconciliation, and daily physician supervision.      Comments:     10/22/2024:Discharge note for Ramon.  His had 87.1% improvement in functional capacity increasing His max METs in the submaximal TM ETT  from 2.6 to 3.8 with test termination of RPE 6.  His exercise tolerance (max METs in tolerated in cardiac rehab) increased by 45.2%.  He had a 9.0% improvement in the [facility] activity estimated MET level with ADLs and physical activity. His [facility] QOL improved by 23.5%.  PHQ-9 score increased from 0 to 1.  SHAWN-7 decreased from 1 to 0.  His weight increased by 3.3 pounds. Rate Your Plate score decreased from 60 to 58.  Ramon tolerates 39-45 mins at 3.0 - 3.6 METs plus wt training. NSR on telemetry.  RHR 63 - 87, ExHR 102 - 127. Resting /70 - 154/74 with appropriate response to exercise reaching 130/80 - 178/70.   The patient attended group educational classes on risk factor reduction and healthy eating.   Discharge plans include joining [facility] [employer] [place] 3-4 days/week for 45-60min .  Encouraged the patient  to continue a disciplined exercise regimen: Frequency: 4-6 days/wk, Intensity: RPE 4-5, Time: 40-50 mins daily, 150-200 mins/wk.  The patient  was encouraged to remain compliant with medications and follow up with cardiologist with any cardiac symptoms and medication management.    9/17/2024 Ramon continues to attend rehab regularly. He states he has been more active in his day to day activities. He also states increase energy and strength.    8/20/2024: Ramon has been attending rehab 3 days/week for 36-40 min at 2.4-3.4.Will add strength training during the next 30 days.    7/22/2024:British speaking, wife translating        Dx:   Encounter Diagnosis   Name Primary?    S/P angioplasty with stent Yes         Description of Diagnosis: patient had a stress echo on 5/31/24 for anginal symptoms that was abnormal. He was then scheduled for a cardiac cath on 6/13/24 which revealed LCx 65% stenosed s/p PCI with DAVIAN x1, prox RCA 45% stenosed, LAD mild-diffuse disease  Date of onset: 5/31/24 and 6/13/24  Other Cardiac History: none        ASSESSMENT    Medical History:   Past Medical History:   Diagnosis Date    Lyme disease        Family History:  Family History   Problem Relation Age of Onset    Lymphoma Mother     Melanoma Father     Prostate cancer Maternal Grandfather        Allergies:   Patient has no known allergies.    Current Medications:   Current Outpatient Medications   Medication Sig Dispense Refill    amLODIPine (NORVASC) 5 mg tablet Take 1 tablet (5 mg total) by mouth daily 90 tablet 1    aspirin (ECOTRIN LOW STRENGTH) 81 mg EC tablet Take 1 tablet (81 mg total) by mouth daily 90 tablet 1    clopidogrel (PLAVIX) 75 mg tablet Take 1 tablet (75 mg total) by mouth daily 90 tablet 1    metoprolol succinate (TOPROL-XL) 25 mg 24 hr tablet Take 1 tablet (25 mg total) by mouth daily 90 tablet 3    nitroglycerin (NITROSTAT) 0.4 mg SL tablet Place 1 tablet (0.4 mg total) under the tongue every 5 (five) minutes as needed for chest pain 25 tablet 0    rosuvastatin (CRESTOR) 20 MG tablet Take 1 tablet (20 mg total) by mouth daily 90 tablet 1     No current  facility-administered medications for this visit.       Medication compliance: Yes   Comments: Pt reports to be compliant with medications. Was unaware of NTG rx ordered by Adria DODD. Going to contact [facility] about getting    Physical Limitations: none    Fall Risk: Low   Comments: Ambulates with a steady gait with no assist device    Cultural needs: none      CAD Risk Factors:  Cholesterol: No  HTN: Yes  DM: No  Obesity: overweight   Inactivity: Yes      EXERCISE ASSESSMENT:     Initial Fitness Assessment: Submaximal TM ETT:  Resting:  BP: 126/76  HR: 60, Exercise:  BP: 156/76  HR: 86, METs:  3.1, ECG Summary: NSR with occ PVCs, PACs, atrial couplets, and Test terminated at:  RPE 6      ECG INTERPRETATION:  NSR with occ PVCs, PACs, atrial couplets    Current Functional Status:  Occupation:  at [employer]  Recreation/Physical Activity: watching tv  ADL’s:No limitations  [place]: No limitations  Home exercise: none  Other; always staying active at home        SMART Exercise Goals:   10% improvement in functional capacity based on max METs achieved in initial fitness assessment  improved DASI score by 10%  increased exercise capacity by 40% based on peak METs tolerated in cardiac rehab exercise session  maintain > 150 minutes per week of moderate intensity exercise    Patient Specific EXERCISE GOALS:       Start doing cardiovascular exercise   Add home exercise    Functional Capacity Screening Tool:  [facility] Activity Status Index:  8.97 METs      PSYCHOSOCIAL ASSESSMENT:    Date of last Assessment:  10/22/2024  Depression screening:  PHQ-9 = 1    Interpretation:  1-4 = Minimal Depression  Anxiety screening:  SHAWN-7 = 0  Interpretation: 0-4  = Not anxious    Pt self-report of depression and anxiety   Patient reports they are coping well with good social support and denies depression or anxiety    Self-reported stress level:  4   Stressors:  father in 95, er[place]  Stress Management Tools: exercise, enjoy a  "hobby, and spend time with family    SMART Psychosocial Goals:     Reduce perceived stress to 1-3/10, Overall Health in [facility] Score < 3, and Change in Health in [facility] Score < 3     Patient Specific PSYCHOCOSOCIAL GOALS:    Stress management techniques  Decrease stress were possible-met    Quality of Life Screen:  (Higher score indicates disease impact on QOL)  [facility] COOP score: 13/45     Social Support:   spouse  Community/Social Activities:      Psychosocial Assessment as it relates to rehabilitation:   Patient denies issues with his/her family or home life that may affect their rehabilitation efforts.       NUTRITION ASSESSMENT:    Initial Weight:  190.6  Current Weight: 196.8    Height:   Ht Readings from Last 1 Encounters:   08/19/24 5' 9\" (1.753 m)       Rate Your Plate Score: 58/72    Diabetes: N/A  A1c: 6.0    last measured: 9/15/2024    Lipid management: Discussed diet and lipid management and Last lipid profile 9/15/2024  Chol 113  TRG 92  HDL 41  LDL 54    Current Dietary Habits:  Eating more fish  Eating smaller portions  1-2 x month red meat  No soda   Drinking lots of water     SMART Nutrition Goals:   HDL >40, Improved Rate Your Plate score  >58, eat 5 or more servings of fruits and vegetables a day, eat no more than 6oz of meat per day, eat meatless meals twice a week or more, choose healthy desserts and sweets such as blanca food cake or  fruit, never add salt to food when cooking or at the table, and choose low sugar desserts and sweets    Patient Specific NUTRITION GOALS:     1. Continue with heart healthy diet   2. Don't add salt to foods   3. Increase fruits and veggies    Drug/Alcohol Use:   No      OTHER CORE COMPONENT ASSESSMENT:    Tobacco Use:     Pt quit 2017   and has abstained    Anginal Symptoms:   chest pain   NTG use: Reviewed Proper use, was unaware prescription was ordered    SMART Goals:   consistent, controlled resting BP < 130/80, medication compliance, abstain from " smoking, and reduced angina    Patient Specific CORE COMPONENT GOALS:     NTG from pharmacy-Met  Start carrying-Met  Monitor BP  Limit sodium intake to help with BP control      INDIVIDUALIZED TREATMENT PLAN      EXERCISE GOALS and PLAN      Blood pressure    Restin//74  Exercise:130//70  Recovery:100//78      Progress toward Exercise goals:   Goals met: completed rehab for 36 sessions., Patient will be encouraged to focus on lifestyle modifications following discharge., exercise for 39-45 min at 3.0-3.6 METS, decreased fatigue and increased strength and endurance    Exercise Intervention:    education on home exercise guidelines, home exercise 30+ mins 2 days opposite CR, and Group class: Risk Factors for Heart Disease    The patient was counseled on exercise guidelines to achieve a minimum of 150 mins/wk of moderate intensity (RPE 4-6)   exercise and encouraged to add 1-2 days of exercise on opposite days of cardiac rehab as tolerated.       Physician Prescribed Exercise    Current Aerobic Exercise Prescription:      Frequency: 3 days/week   Supplement with home exercise 2+ days/wk as tolerated       Minutes: 39-45         METS: 3.0-3.6          HR: 130//70   RPE: 3-4         Modalities: Treadmill, UBE, NuStep, and Recumbent bike     Exercise workloads will be progressed gradually as tolerated, within limits of patient's ability, and according to the patient's   response to the exercise program.      Aerobic/strength training Exercise Prescription Plan for discharge       Exercise Education: benefit of exercise for CAD risk factors, home exercise guidelines, AHA guidelines to achieve >150 mins/wk of moderate exercise, RPE scale, Group class: Risk Factors for Heart Disease, exercise instructions/guidelines for discharge , and physical activity/exercise in extreme weather conditions     Readiness to change: Action:  (Changing behavior)      NUTRITION GOALS AND  PLAN      Nutritional   Reviewed patient's Rate your Plate. Discussed key elements of heart healthy eating. Reviewed patient goals for dietary modifications and their clinical implications.  Reviewed most recent lipid profile.     Patient's progress toward Nutrition goals:    Goals not met: wt up 3.3 lbs.  , Patient will be encouraged to focus on lifestyle modifications following discharge. has not been eating pork or fried food. Most of the food is cooked in the oven or air chaparro      Nutrition Intervention:   increase daily intake of fruits and vegetables, limit meat intake to less than 6oz per day, eat more meatless meals, choose desserts such as fruit, blanca food cake, low-fat or fat-free sweets, never/rarely add salt to food when cooking or at the table, and choose low sugar desserts and sweets    Measurable goals were based Rate Your Plate Dietary Self-Assessment. These are the areas in which the patient could score higher on the assessment.  Goals include recommendations for a heart healthy diet based on American Heart Association.    Nutrition Education:   low sodium diet  maintaining hydration  nutrition for  lipid management  healthy choices while dining out  portion control  group class: Heart Healthy Eating  group class:  Label Reading    Readiness to change: Action:  (Changing behavior)      PSYCHOSOCIAL GOALS AND PLAN    Psychosocial Assessment as it relates to rehabilitation:   Patient denies issues with his/her family or home life that may affect their rehabilitation efforts.     Patient's progress toward Psychosocial goals:    Patient will be encouraged to focus on lifestyle modifications following discharge.no stress or depression, takes time outdoors to relax    Psychosocial Intervention:   Class: Stress and Your Health, Class: Relaxation, Practice relaxation techniques, Keep a positive mindset, and Enjoy family    Psychosocial Education: benefits of a positive support system, stress management  techniques, class:  Relaxation, and class:  Stress and Your Health     Information to utilize [employer] was provided as well as contact information for counseling through  Behavioral Health and group psychotherapy groups available.    Readiness to change: Action:  (Changing behavior)      OTHER CORE COMPONENTS GOALS and PLAN      Blood Pressure will be monitored throughout the program and cardiologist will be notified of elevated trends.    Pt will be encouraged to monitor home BP if advised by cardiologist.    Tobacco Intervention:   Pt quit 2017 and has abstained since quitting.      Progress toward Core Component goals:   Patient will be encouraged to focus on lifestyle modifications following discharge., will continue to monitor BP    Other Core Components Intervention:   group class: Understanding Heart Disease, group class: Common Heart Medications, medication compliance, engage in regular exercise, check labels for sodium content, and monitor home BP    Group and Individual Education:  understanding high blood pressure and it's relationship to CAD, components of blood pressure management, proper use of sublingual NTG, group class: Understanding Heart Disease, and group class:  Common Heart Medications    Readiness to change: Action:  (Changing behavior)

## 2024-12-23 ENCOUNTER — TELEPHONE (OUTPATIENT)
Age: 64
End: 2024-12-23

## 2024-12-23 DIAGNOSIS — Z95.820 S/P ANGIOPLASTY WITH STENT: ICD-10-CM

## 2024-12-23 DIAGNOSIS — R07.9 CHEST PAIN, UNSPECIFIED TYPE: ICD-10-CM

## 2024-12-23 DIAGNOSIS — R94.39 ABNORMAL STRESS TEST: ICD-10-CM

## 2024-12-23 NOTE — TELEPHONE ENCOUNTER
"Received call from pt's daughter, Nelly, asking if pt needs any blood work prior to upcoming appt on 1/7/2025. Pt was last seen on 6/25/2024 by Adria Donato and the only blood work order that was placed was for a lipid panel which pt had done on 9/15/2024. No other blood work orders were placed. Nelly voiced understanding. No further questions.   "

## 2024-12-24 RX ORDER — ASPIRIN 81 MG/1
81 TABLET ORAL DAILY
Qty: 90 TABLET | Refills: 1 | Status: SHIPPED | OUTPATIENT
Start: 2024-12-24

## 2024-12-24 RX ORDER — AMLODIPINE BESYLATE 5 MG/1
5 TABLET ORAL DAILY
Qty: 90 TABLET | Refills: 1 | Status: SHIPPED | OUTPATIENT
Start: 2024-12-24

## 2025-01-09 ENCOUNTER — OFFICE VISIT (OUTPATIENT)
Dept: CARDIOLOGY CLINIC | Facility: CLINIC | Age: 65
End: 2025-01-09
Payer: COMMERCIAL

## 2025-01-09 VITALS
WEIGHT: 201 LBS | BODY MASS INDEX: 29.77 KG/M2 | HEART RATE: 67 BPM | HEIGHT: 69 IN | OXYGEN SATURATION: 98 % | SYSTOLIC BLOOD PRESSURE: 117 MMHG | DIASTOLIC BLOOD PRESSURE: 79 MMHG | TEMPERATURE: 97.5 F

## 2025-01-09 DIAGNOSIS — I10 PRIMARY HYPERTENSION: ICD-10-CM

## 2025-01-09 DIAGNOSIS — E78.5 DYSLIPIDEMIA: ICD-10-CM

## 2025-01-09 DIAGNOSIS — Z95.820 S/P ANGIOPLASTY WITH STENT: ICD-10-CM

## 2025-01-09 DIAGNOSIS — I25.118 CORONARY ARTERY DISEASE OF NATIVE ARTERY OF NATIVE HEART WITH STABLE ANGINA PECTORIS (HCC): ICD-10-CM

## 2025-01-09 PROCEDURE — 99214 OFFICE O/P EST MOD 30 MIN: CPT

## 2025-01-09 RX ORDER — ISOSORBIDE MONONITRATE 30 MG/1
30 TABLET, EXTENDED RELEASE ORAL DAILY
Qty: 30 TABLET | Refills: 1 | Status: SHIPPED | OUTPATIENT
Start: 2025-01-09

## 2025-01-09 NOTE — PROGRESS NOTES
Ramon Mora  1960  [id number]  [facility] [address] [facility] [address] [phone]    1. Coronary artery disease of native artery of native heart with stable angina pectoris (HCC)  POCT ECG    isosorbide mononitrate (IMDUR) 30 mg 24 hr tablet      2. S/P angioplasty with stent        3. Primary hypertension        4. Dyslipidemia            Summary/Discussion:  Coronary artery disease:  - s/p PCI and DAVIAN x 1 to mid Cx on 6/13/2024 after significantly abnormal stress echo on 5/31/2024  residual prox RCA lesion 45% stenosed; continue medical management   - reports infrequent symptoms of intermittent chest discomfort at rest and with exertion. He denies any recent limitation in his functional capacity   recommend initiation of Imdur 30 mg daily. If he continues to have symptoms will repeat stress testing   - EKG today demonstrating sinus rhythm without any acute ischemic changes   - continue on aspirin, plavix, statin, and metoprolol. PRN SL nitro prescribed  - adherence to DTAP therapy reinforced in which patient has been taking without any missed doses  - diet/lifestyle modifications were discussed   - completed cardiac rehab     Hypertension:  - controlled, /79  - continue present medication regimen + imdur as noted above  - lifestyle modification   - close blood pressure monitoring     Dyslipidemia:  - Lipid Profile:    Latest Reference Range & Units 09/15/24 08:02   Cholesterol See Comment mg/dL 113   Triglycerides See Comment mg/dL 92   HDL >=40 mg/dL 41   LDL Calculated 0 - 100 mg/dL 54   - continue crestor 20 mg daily   - encouraged low cholesterol diet and annual lipid follow up    Palpitations:   - he reported daily palpitations during our last office visit in which a 48 hour Holter (7/2024) was obtained which revealed sinus rhythm with average HR of 73 BPM.  Rare ventricular and supraventricular ectopy as noted above. One 8 beat run of  wide complex tachycardia noted at 4:17 AM, possibly ventricular tachycardia or SVT with aberrant conduction. No sustained arrhythmias recorded. No pauses. No symptom correlation  - continue metoprolol succinate 25 mg daily  - symptoms of palpitations stable     Interval History: Ramon Mora is a 64 y.o. year old male with history of newly diagnosed CAD with recent PCI and DAVIAN x 1 to mid Cx on , HTN, and dyslipidemia who presents to the office today for a follow up.     Since his last office visit he has been feeling overall well from a cardiac standpoint although he does report infrequent symptoms of intermittent chest discomfort without any recent change in his functional capacity. He works as a  in which he is active throughout the day. He denies any lower extremity edema, orthopnea, and PND. He denies lightheadedness, dizziness, and syncope. He is currently denying palpitations.        He will RTO in 3 months with myself and in 6-9 months with Dr. Umanzor or sooner if necessary. He will call with any concerns.       Medical Problems       Problem List       Family history of Alzheimer's disease    Coronary artery disease of native artery of native heart with stable angina pectoris (HCC)    S/P angioplasty with stent        Past Medical History:   Diagnosis Date    Lyme disease      Social History     Socioeconomic History    Marital status: /Civil Union     Spouse name: Not on file    Number of children: Not on file    Years of education: Not on file    Highest education level: Not on file   Occupational History    Not on file   Tobacco Use    Smoking status: Former     Types: Cigarettes     Start date:      Quit date: 1975     Years since quittin.0     Passive exposure: Past    Smokeless tobacco: Never   Vaping Use    Vaping status: Never Used   Substance and Sexual Activity    Alcohol use: Not Currently    Drug use: Not Currently    Sexual activity: Not on file     Comment: defer    Other Topics Concern    Not on file   Social History Narrative    Not on file     Social Drivers of Health     Financial Resource Strain: Not on file   Food Insecurity: No Food Insecurity (6/13/2024)    Nursing - Inadequate Food Risk Classification     Worried About Running Out of Food in the Last Year: Never true     Ran Out of Food in the Last Year: Never true     Ran Out of Food in the Last Year: Not on file   Transportation Needs: No Transportation Needs (6/13/2024)    PRAPARE - Transportation     Lack of Transportation (Medical): No     Lack of Transportation (Non-Medical): No   Physical Activity: Not on file   Stress: Not on file   Social Connections: Not on file   Intimate Partner Violence: Not on file   Housing Stability: Low Risk  (6/13/2024)    Housing Stability Vital Sign     Unable to Pay for Housing in the Last Year: No     Number of Times Moved in the Last Year: 1     Homeless in the Last Year: No      Family History   Problem Relation Age of Onset    Lymphoma Mother     Melanoma Father     Prostate cancer Maternal Grandfather      Past Surgical History:   Procedure Laterality Date    CARDIAC CATHETERIZATION Left 6/13/2024    Procedure: Cardiac Left Heart Cath;  Surgeon: Fang Haddad DO;  Location: [facility];  Service: Cardiology    CARDIAC CATHETERIZATION  6/13/2024    Procedure: Cardiac catheterization;  Surgeon: Fang Haddad DO;  Location: [facility];  Service: Cardiology    CARDIAC CATHETERIZATION N/A 6/13/2024    Procedure: Cardiac Coronary Angiogram;  Surgeon: Fang Haddad DO;  Location: [facility];  Service: Cardiology    CARDIAC CATHETERIZATION N/A 6/13/2024    Procedure: Cardiac FFR/IFR;  Surgeon: Fang Haddad DO;  Location: [facility];  Service: Cardiology    CARDIAC CATHETERIZATION N/A 6/13/2024    Procedure: Cardiac PCI Stent;  Surgeon: Fang Haddad DO;  Location: [facility];  Service: Cardiology       Current  "Outpatient Medications:     amLODIPine (NORVASC) 5 mg tablet, Take 1 tablet (5 mg total) by mouth daily, Disp: 90 tablet, Rfl: 1    aspirin (ECOTRIN LOW STRENGTH) 81 mg EC tablet, Take 1 tablet (81 mg total) by mouth daily, Disp: 90 tablet, Rfl: 1    clopidogrel (PLAVIX) 75 mg tablet, Take 1 tablet (75 mg total) by mouth daily, Disp: 90 tablet, Rfl: 1    isosorbide mononitrate (IMDUR) 30 mg 24 hr tablet, Take 1 tablet (30 mg total) by mouth daily, Disp: 30 tablet, Rfl: 1    metoprolol succinate (TOPROL-XL) 25 mg 24 hr tablet, Take 1 tablet (25 mg total) by mouth daily, Disp: 90 tablet, Rfl: 3    nitroglycerin (NITROSTAT) 0.4 mg SL tablet, Place 1 tablet (0.4 mg total) under the tongue every 5 (five) minutes as needed for chest pain, Disp: 25 tablet, Rfl: 0    rosuvastatin (CRESTOR) 20 MG tablet, Take 1 tablet (20 mg total) by mouth daily, Disp: 90 tablet, Rfl: 1  No Known Allergies    Labs:     Chemistry        Component Value Date/Time    K 4.3 09/15/2024 0802     09/15/2024 0802    CO2 27 09/15/2024 0802    BUN 24 09/15/2024 0802    CREATININE 0.73 09/15/2024 0802        Component Value Date/Time    CALCIUM 8.8 09/15/2024 0802    ALKPHOS 69 06/09/2024 0846    AST 14 06/09/2024 0846    ALT 16 06/09/2024 0846            No results found for: \"CHOL\"  Lab Results   Component Value Date    HDL 41 09/15/2024    HDL 27 (L) 05/04/2024    HDL 39 (L) 05/06/2023     Lab Results   Component Value Date    LDLCALC 54 09/15/2024    LDLCALC 90 05/04/2024    LDLCALC 118 (H) 05/06/2023     Lab Results   Component Value Date    TRIG 92 09/15/2024    TRIG 94 05/04/2024    TRIG 125 05/06/2023     No results found for: \"CHOLHDL\"    Imaging: Cardiac catheterization    Result Date: 6/13/2024  Narrative:   S/P successful PCI with DAVIAN x 1 to the Mid Cx lesion 65% stenosis   Prox RCA lesion is 45% stenosed and amenable to medical management   LVEDP is normal without gradient on LV-AO pullback     Stress strip    Result Date: " 5/31/2024  Narrative: Confirmed by L[facility] STEVENS ([id number]),  Funmi Paula ([id number]) on 5/31/2024 1:23:30 PM    Echo stress test, exercise    Result Date: 5/31/2024  Narrative:   Stress ECG: Overall, the patient's exercise capacity was moderately impaired for their age. The patient after exercising for 4 min and 5 sec and had a maximal HR of 118 bpm (80% of MPHR) and 5.8 METS. The patient experienced exercise-limiting angina during the test.   Stress ECG: Horizontal ST depression of 2.5-3.0 mm in leads II, III and aVF noted.  In addition 1.5 mm ST depressions noted in leads V3-V6.  ST depressions were noted at low workload and persisted about 3 minutes into recovery. The stress ECG is consistent with ischemia after maximal exercise, with reproduction of symptoms.   Peak Stress Echo: Left ventricle cavity has normal reduction in size at peak stress. The peak stress echo showed new anterolateral wall motion abnormalities compared to baseline.   Echo Post Impression: The study is consistent with ischemia, after maximal exercise. Markedly abnormal exercise treadmill stress echo as noted above. Clinically abnormal for limiting angina. Electrocardiographic and echocardiographic evidence of ischemia at low workload. Further ischemic evaluation should be considered with cardiac catheterization if clinically indicated. ER precautions reviewed with the patient.     Echo complete w/ contrast if indicated    Result Date: 5/31/2024  Narrative:   Left Ventricle: Left ventricular cavity size is normal. Wall thickness is mildly increased. The left ventricular ejection fraction is 50% by single dimension measurement. Systolic function is low normal. Diastolic function is mildly abnormal, consistent with grade I (abnormal) relaxation.  Left atrial filling pressure is elevated.   Right Ventricle: Right ventricular cavity size is normal. Systolic function is normal.   Left Atrium: The atrium is mildly dilated (35-41 mL/m2).   Right  "Atrium: The atrium is mildly dilated.   Aortic Valve: The aortic valve is trileaflet.  There is moderate area of calcification noted.  This appears to be subvalvular in location.   The leaflets exhibit normal mobility. There is mild regurgitation. The aortic valve has no significant stenosis.   Mitral Valve: There is mild annular calcification. There is mild regurgitation.   Pulmonic Valve: There is mild regurgitation.   Prior TTE study available for comparison. Prior study date: 5/5/2023. No significant changes noted compared to the prior study.       ECG:  sinus rhythm      Review of Systems   Constitutional: Negative.   HENT: Negative.     Eyes: Negative.    Cardiovascular:  Positive for chest pain (discomfort).   Respiratory: Negative.     Endocrine: Negative.    Skin: Negative.    Gastrointestinal: Negative.    Genitourinary: Negative.    Psychiatric/Behavioral: Negative.     Allergic/Immunologic: Negative.    All other systems reviewed and are negative.      Vitals:    01/09/25 1459   BP: 117/79   Pulse: 67   Temp: 97.5 °F (36.4 °C)   SpO2: 98%     Vitals:    01/09/25 1459   Weight: 91.2 kg (201 lb)     Height: 5' 9\" (175.3 cm)   Body mass index is 29.68 kg/m².    Physical Exam  Vitals and nursing note reviewed.   Constitutional:       General: He is not in acute distress.     Appearance: He is not ill-appearing.   HENT:      Head: Normocephalic.      Nose: Nose normal.      Mouth/Throat:      Mouth: Mucous membranes are moist.      Pharynx: Oropharynx is clear.   Cardiovascular:      Rate and Rhythm: Normal rate and regular rhythm.      Heart sounds: No murmur heard.  Pulmonary:      Effort: Pulmonary effort is normal.      Breath sounds: Normal breath sounds.   Musculoskeletal:         General: Normal range of motion.      Cervical back: Normal range of motion.      Right lower leg: No edema.      Left lower leg: No edema.   Skin:     General: Skin is warm and dry.   Neurological:      Mental Status: He is " alert and oriented to person, place, and time.   Psychiatric:         Mood and Affect: Mood normal.         Behavior: Behavior normal.

## 2025-01-13 PROCEDURE — 93000 ELECTROCARDIOGRAM COMPLETE: CPT

## 2025-02-18 ENCOUNTER — OFFICE VISIT (OUTPATIENT)
Dept: FAMILY MEDICINE CLINIC | Facility: CLINIC | Age: 65
End: 2025-02-18
Payer: COMMERCIAL

## 2025-02-18 VITALS
BODY MASS INDEX: 29.77 KG/M2 | HEIGHT: 69 IN | OXYGEN SATURATION: 98 % | HEART RATE: 72 BPM | WEIGHT: 201 LBS | SYSTOLIC BLOOD PRESSURE: 136 MMHG | DIASTOLIC BLOOD PRESSURE: 76 MMHG

## 2025-02-18 DIAGNOSIS — B35.1 ONYCHOMYCOSIS: ICD-10-CM

## 2025-02-18 DIAGNOSIS — R06.09 DOE (DYSPNEA ON EXERTION): ICD-10-CM

## 2025-02-18 DIAGNOSIS — E55.9 VITAMIN D DEFICIENCY: ICD-10-CM

## 2025-02-18 DIAGNOSIS — R01.1 MURMUR: ICD-10-CM

## 2025-02-18 DIAGNOSIS — B35.3 TINEA PEDIS OF BOTH FEET: Primary | ICD-10-CM

## 2025-02-18 DIAGNOSIS — Z79.899 OTHER LONG TERM (CURRENT) DRUG THERAPY: ICD-10-CM

## 2025-02-18 PROCEDURE — 99214 OFFICE O/P EST MOD 30 MIN: CPT | Performed by: FAMILY MEDICINE

## 2025-02-18 RX ORDER — CLOTRIMAZOLE 1 %
CREAM (GRAM) TOPICAL 2 TIMES DAILY
Qty: 60 G | Refills: 1 | Status: SHIPPED | OUTPATIENT
Start: 2025-02-18

## 2025-02-18 RX ORDER — TERBINAFINE HYDROCHLORIDE 250 MG/1
250 TABLET ORAL DAILY
Qty: 30 TABLET | Refills: 2 | Status: SHIPPED | OUTPATIENT
Start: 2025-02-18 | End: 2025-05-19

## 2025-02-18 NOTE — PROGRESS NOTES
Name: Ramon Mora      : 1960      MRN: [id number]  Encounter Provider: Herbert Garcia MD  Encounter Date: 2025   Encounter department: [facility] [place]  :  Assessment & Plan  Tinea pedis of both feet  Terbinafine and clotrimazole sent to pharmacy. Discussed terbinafine may affect liver function - LFTs on last CMP in 2024 WNL. Recommended ibuprofen as needed for toe pain. Will recheck CMP and continue to follow.     Orders:    terbinafine (LamISIL) 250 mg tablet; Take 1 tablet (250 mg total) by mouth daily    clotrimazole (LOTRIMIN) 1 % cream; Apply topically 2 (two) times a day    Onychomycosis  See plan as stated above.     Orders:    terbinafine (LamISIL) 250 mg tablet; Take 1 tablet (250 mg total) by mouth daily    clotrimazole (LOTRIMIN) 1 % cream; Apply topically 2 (two) times a day    SUAREZ (dyspnea on exertion)  Suspect acute congestive heart failure v. cardiac valve dysfunction with 10lbs weight gain over last few months and murmur on exam. Last echo from 2024 showed LVEF 50%, mildly abnormal diastolic function consistent with grade I relaxation, moderate aortic valve calcification without significant stenosis. No significant regurgitation or stenosis of other valves. Labs and echo ordered, including BNP. Patient to call or return to office if symptoms worsen.     Orders:    B-Type Natriuretic Peptide(BNP); Future    CBC and differential    Comprehensive metabolic panel    TSH, 3rd generation with Free T4 reflex    Hemoglobin A1C    Vitamin D 25 hydroxy    Murmur    Orders:    Echo complete w/ contrast if indicated; Future    Other long term (current) drug therapy    Orders:    Hemoglobin A1C    Vitamin D deficiency    Orders:    Vitamin D 25 hydroxy           History of Present Illness   Ramon Mora is a 64 y.o. male with a significant PMH of HTN and CAD s/p angioplasty with stent who is presenting to the office for a follow-up visit. Patient reports concerns over  "discoloration of his right big toenail and some pain at the toes when he walks. He states his big toenail is not painful and denies trauma to the area. Patient also reports dyspnea on exertion - it had improved with his stent placement in 6/2024, but states it is now occurring again. He states he gets occasional headaches that go away on its own without medication. Patient notes some mid-back pain when lying in bed. He denies lightheadedness, dizziness, chest pain, palpitations, or wheezing.       Review of Systems   Constitutional:  Negative for fatigue.   Respiratory:  Positive for shortness of breath. Negative for cough, chest tightness and wheezing.    Cardiovascular:  Negative for chest pain, palpitations and leg swelling.   Gastrointestinal:  Negative for abdominal pain.   Musculoskeletal:  Positive for back pain. Negative for arthralgias and joint swelling.   Neurological:  Positive for headaches. Negative for dizziness, weakness and light-headedness.       Objective   /76   Pulse 72   Ht 5' 9\" (1.753 m)   Wt 91.2 kg (201 lb)   SpO2 98%   BMI 29.68 kg/m²      Physical Exam  Vitals reviewed.   Constitutional:       Appearance: Normal appearance.   Cardiovascular:      Rate and Rhythm: Normal rate and regular rhythm.      Pulses: Normal pulses.      Heart sounds: Murmur heard.   Pulmonary:      Effort: Pulmonary effort is normal.      Breath sounds: Normal breath sounds. No wheezing, rhonchi or rales.   Musculoskeletal:      Right lower leg: No edema.      Left lower leg: No edema.   Skin:     General: Skin is warm and dry.      Comments: Onychomycosis of right toenails with black discoloration of big toenail. No open cuts, wounds, or erythema.    Neurological:      Mental Status: He is alert.         "

## 2025-02-28 ENCOUNTER — HOSPITAL ENCOUNTER (OUTPATIENT)
Dept: NON INVASIVE DIAGNOSTICS | Facility: HOSPITAL | Age: 65
Discharge: HOME | End: 2025-02-28
Payer: COMMERCIAL

## 2025-02-28 VITALS
BODY MASS INDEX: 29.77 KG/M2 | DIASTOLIC BLOOD PRESSURE: 76 MMHG | HEIGHT: 69 IN | WEIGHT: 201 LBS | HEART RATE: 72 BPM | SYSTOLIC BLOOD PRESSURE: 136 MMHG

## 2025-02-28 DIAGNOSIS — R01.1 MURMUR: ICD-10-CM

## 2025-02-28 LAB
AORTIC ROOT: 3.5 CM
AORTIC VALVE MEAN VELOCITY: 12.9 M/S
ASCENDING AORTA: 3.9 CM
AV MEAN PRESS GRAD SYS DOP V1V2: 7 MMHG
AV PEAK GRADIENT: 13 MMHG
AV VMAX SYS DOP: 1.82 M/S
BSA FOR ECHO PROCEDURE: 2.07 M2
DOP CALC AO VTI: 34.81 CM
E WAVE DECELERATION TIME: 300 MS
E/A RATIO: 0.75
FRACTIONAL SHORTENING: 30 (ref 28–44)
INTERVENTRICULAR SEPTUM IN DIASTOLE (PARASTERNAL SHORT AXIS VIEW): 1.1 CM
INTERVENTRICULAR SEPTUM: 1.1 CM (ref 0.6–1.1)
LAAS-AP2: 20.1 CM2
LAAS-AP4: 15.8 CM2
LEFT ATRIUM SIZE: 4.2 CM
LEFT ATRIUM VOLUME (MOD BIPLANE): 51 ML
LEFT ATRIUM VOLUME INDEX (MOD BIPLANE): 24.6 ML/M2
LEFT INTERNAL DIMENSION IN SYSTOLE: 2.8 CM (ref 2.1–4)
LEFT VENTRICULAR INTERNAL DIMENSION IN DIASTOLE: 4 CM (ref 3.5–6)
LEFT VENTRICULAR POSTERIOR WALL IN END DIASTOLE: 1 CM
LEFT VENTRICULAR STROKE VOLUME: 39 ML
LV EF US.2D.A4C+ESTIMATED: 62 %
LVSV (TEICH): 39 ML
MV E'TISSUE VEL-SEP: 8 CM/S
MV PEAK A VEL: 1.3 M/S
MV PEAK E VEL: 97 CM/S
MV STENOSIS PRESSURE HALF TIME: 87 MS
MV VALVE AREA P 1/2 METHOD: 2.53
RA PRESSURE ESTIMATED: 3 MMHG
RIGHT ATRIUM AREA SYSTOLE A4C: 19.4 CM2
RIGHT VENTRICLE ID DIMENSION: 3.8 CM
RV PSP: 19 MMHG
SL CV LEFT ATRIUM LENGTH A2C: 5.5 CM
SL CV LV EF: 55
SL CV PED ECHO LEFT VENTRICLE DIASTOLIC VOLUME (MOD BIPLANE) 2D: 69 ML
SL CV PED ECHO LEFT VENTRICLE SYSTOLIC VOLUME (MOD BIPLANE) 2D: 30 ML
TR MAX PG: 16 MMHG
TR PEAK VELOCITY: 2 M/S
TRICUSPID ANNULAR PLANE SYSTOLIC EXCURSION: 2.5 CM
TRICUSPID VALVE PEAK REGURGITATION VELOCITY: 1.97 M/S

## 2025-02-28 PROCEDURE — 93306 TTE W/DOPPLER COMPLETE: CPT | Performed by: INTERNAL MEDICINE

## 2025-02-28 PROCEDURE — 93306 TTE W/DOPPLER COMPLETE: CPT

## 2025-03-01 ENCOUNTER — RESULTS FOLLOW-UP (OUTPATIENT)
Dept: FAMILY MEDICINE CLINIC | Facility: CLINIC | Age: 65
End: 2025-03-01

## 2025-03-04 DIAGNOSIS — Z95.820 S/P ANGIOPLASTY WITH STENT: ICD-10-CM

## 2025-03-05 RX ORDER — CLOPIDOGREL BISULFATE 75 MG/1
TABLET ORAL
Qty: 30 TABLET | Refills: 0 | Status: SHIPPED | OUTPATIENT
Start: 2025-03-05

## 2025-03-13 DIAGNOSIS — I25.118 CORONARY ARTERY DISEASE OF NATIVE ARTERY OF NATIVE HEART WITH STABLE ANGINA PECTORIS (HCC): ICD-10-CM

## 2025-03-13 RX ORDER — ISOSORBIDE MONONITRATE 30 MG/1
TABLET, EXTENDED RELEASE ORAL
Qty: 30 TABLET | Refills: 0 | Status: SHIPPED | OUTPATIENT
Start: 2025-03-13

## 2025-03-30 DIAGNOSIS — Z95.820 S/P ANGIOPLASTY WITH STENT: ICD-10-CM

## 2025-04-02 RX ORDER — CLOPIDOGREL BISULFATE 75 MG/1
TABLET ORAL
Qty: 90 TABLET | Refills: 1 | Status: SHIPPED | OUTPATIENT
Start: 2025-04-02

## 2025-04-08 DIAGNOSIS — R94.39 ABNORMAL STRESS TEST: ICD-10-CM

## 2025-04-08 DIAGNOSIS — R07.9 CHEST PAIN, UNSPECIFIED TYPE: ICD-10-CM

## 2025-04-08 RX ORDER — ROSUVASTATIN CALCIUM 20 MG/1
20 TABLET, COATED ORAL DAILY
Qty: 90 TABLET | Refills: 1 | Status: SHIPPED | OUTPATIENT
Start: 2025-04-08

## 2025-04-10 ENCOUNTER — OFFICE VISIT (OUTPATIENT)
Dept: CARDIOLOGY CLINIC | Facility: CLINIC | Age: 65
End: 2025-04-10
Payer: COMMERCIAL

## 2025-04-10 VITALS
HEIGHT: 69 IN | BODY MASS INDEX: 29.59 KG/M2 | HEART RATE: 71 BPM | SYSTOLIC BLOOD PRESSURE: 120 MMHG | TEMPERATURE: 97.5 F | DIASTOLIC BLOOD PRESSURE: 80 MMHG | WEIGHT: 199.8 LBS | OXYGEN SATURATION: 98 %

## 2025-04-10 DIAGNOSIS — I25.118 CORONARY ARTERY DISEASE OF NATIVE ARTERY OF NATIVE HEART WITH STABLE ANGINA PECTORIS (HCC): ICD-10-CM

## 2025-04-10 DIAGNOSIS — R06.83 SNORING: ICD-10-CM

## 2025-04-10 DIAGNOSIS — Z95.820 S/P ANGIOPLASTY WITH STENT: ICD-10-CM

## 2025-04-10 DIAGNOSIS — E78.5 DYSLIPIDEMIA: ICD-10-CM

## 2025-04-10 DIAGNOSIS — I10 PRIMARY HYPERTENSION: ICD-10-CM

## 2025-04-10 DIAGNOSIS — R07.89 CHEST DISCOMFORT: ICD-10-CM

## 2025-04-10 PROCEDURE — 99214 OFFICE O/P EST MOD 30 MIN: CPT

## 2025-04-10 RX ORDER — ISOSORBIDE MONONITRATE 60 MG/1
60 TABLET, EXTENDED RELEASE ORAL DAILY
Qty: 90 TABLET | Refills: 1 | Status: SHIPPED | OUTPATIENT
Start: 2025-04-10

## 2025-04-10 NOTE — PROGRESS NOTES
Ramon Mora  1960  [id number]  [facility] [address] [address] [facility] [address] [phone]    1. Coronary artery disease of native artery of native heart with stable angina pectoris (HCC)  isosorbide mononitrate (IMDUR) 60 mg 24 hr tablet    NM myocardial perfusion spect (stress and/or rest)      2. Primary hypertension  Ambulatory referral to Sleep Medicine    NM myocardial perfusion spect (stress and/or rest)      3. Snoring  Ambulatory referral to Sleep Medicine      4. Chest discomfort  NM myocardial perfusion spect (stress and/or rest)      5. Dyslipidemia  Lipid Panel with Direct LDL reflex      6. S/P angioplasty with stent              Summary/Discussion:  Coronary artery disease:  - s/p PCI and DAVIAN x 1 to mid Cx on 6/13/2024 after significantly abnormal stress echo on 5/31/2024  residual prox RCA lesion 45% stenosed; continue medical management   - continues to report infrequent symptoms of intermittent chest discomfort at rest and with exertion despite initiation of imdur 30 mg daily. He denies any recent limitation in his functional capacity   recommend up titration in his of Imdur to 60 mg daily  repeat NM stress test for further evaluation   - continue on aspirin, plavix, statin, and metoprolol. PRN SL nitro prescribed  - adherence to DTAP therapy reinforced in which patient has been taking without any missed doses  - diet/lifestyle modifications were discussed   - completed cardiac rehab     Hypertension:  - controlled  - continue present medication regimen + imdur as noted above  - lifestyle modification   - close blood pressure monitoring   - sleep medicine referral to rule out sleep apnea with reports of snoring and daytime sleepiness     Dyslipidemia:  - Lipid Profile:    Latest Reference Range & Units 09/15/24 08:02   Cholesterol See Comment mg/dL 113   Triglycerides See Comment mg/dL 92   HDL >=40 mg/dL 41   LDL Calculated 0 - 100 mg/dL 54    - continue crestor 20 mg daily   - repeat lipids   - encouraged low cholesterol diet and annual lipid follow up    Palpitations:   - he reported daily palpitations during our last office visit in which a 48 hour Holter (2024) was obtained which revealed sinus rhythm with average HR of 73 BPM.  Rare ventricular and supraventricular ectopy as noted above. One 8 beat run of wide complex tachycardia noted at 4:17 AM, possibly ventricular tachycardia or SVT with aberrant conduction. No sustained arrhythmias recorded. No pauses. No symptom correlation  - continue metoprolol succinate 25 mg daily  - denies any symptoms of palpitations today     Interval History: Ramon Mora is a 64 y.o. year old male with history mentioned in problem list who presents to the office today for a follow up.     Since his last office visit he continues to report infrequent symptoms of intermittent chest discomfort without any recent change in his functional capacity. He works as a  in which he is active throughout the day. He denies any lower extremity edema, orthopnea, and PND. He denies lightheadedness, dizziness, and syncope. He is currently denying palpitations.        He will RTO on 2025 with Dr. Umanzor or sooner if necessary. He will call with any concerns.       Medical Problems       Problem List       Family history of Alzheimer's disease    Coronary artery disease of native artery of native heart with stable angina pectoris (HCC)    S/P angioplasty with stent        Past Medical History:   Diagnosis Date    Lyme disease      Social History     Socioeconomic History    Marital status: /Civil Union     Spouse name: Not on file    Number of children: Not on file    Years of education: Not on file    Highest education level: Not on file   Occupational History    Not on file   Tobacco Use    Smoking status: Former     Types: Cigarettes     Start date:      Quit date: 1975     Years since quittin.5      Passive exposure: Past    Smokeless tobacco: Never   Vaping Use    Vaping status: Never Used   Substance and Sexual Activity    Alcohol use: Not Currently    Drug use: Not Currently    Sexual activity: Not on file     Comment: defer   Other Topics Concern    Not on file   Social History Narrative    Not on file     Social Drivers of Health     Financial Resource Strain: Not on file   Food Insecurity: No Food Insecurity (6/13/2024)    Nursing - Inadequate Food Risk Classification     Worried About Running Out of Food in the Last Year: Never true     Ran Out of Food in the Last Year: Never true     Ran Out of Food in the Last Year: Not on file   Transportation Needs: No Transportation Needs (6/13/2024)    PRAPARE - Transportation     Lack of Transportation (Medical): No     Lack of Transportation (Non-Medical): No   Physical Activity: Not on file   Stress: Not on file   Social Connections: Not on file   Intimate Partner Violence: Not on file   Housing Stability: Low Risk  (6/13/2024)    Housing Stability Vital Sign     Unable to Pay for Housing in the Last Year: No     Number of Times Moved in the Last Year: 1     Homeless in the Last Year: No      Family History   Problem Relation Name Age of Onset    Lymphoma Mother      Melanoma Father      Prostate cancer Maternal Grandfather       Past Surgical History:   Procedure Laterality Date    CARDIAC CATHETERIZATION Left 6/13/2024    Procedure: Cardiac Left Heart Cath;  Surgeon: Fang Haddad DO;  Location: [facility];  Service: Cardiology    CARDIAC CATHETERIZATION  6/13/2024    Procedure: Cardiac catheterization;  Surgeon: Fang Haddad DO;  Location: [facility];  Service: Cardiology    CARDIAC CATHETERIZATION N/A 6/13/2024    Procedure: Cardiac Coronary Angiogram;  Surgeon: Fang Haddad DO;  Location: [facility];  Service: Cardiology    CARDIAC CATHETERIZATION N/A 6/13/2024    Procedure: Cardiac FFR/IFR;  Surgeon: Fang MAHMOOD  "DO Boo;  Location: [facility];  Service: Cardiology    CARDIAC CATHETERIZATION N/A 6/13/2024    Procedure: Cardiac PCI Stent;  Surgeon: Fang Haddad DO;  Location: [facility];  Service: Cardiology       Current Outpatient Medications:     clopidogrel (PLAVIX) 75 mg tablet, TOME 1 TABLETA POR VIA ORAL TODOS LOS LUJAN, Disp: 90 tablet, Rfl: 1    clotrimazole (LOTRIMIN) 1 % cream, Apply topically 2 (two) times a day, Disp: 60 g, Rfl: 1    isosorbide mononitrate (IMDUR) 60 mg 24 hr tablet, Take 1 tablet (60 mg total) by mouth daily, Disp: 90 tablet, Rfl: 1    metoprolol succinate (TOPROL-XL) 25 mg 24 hr tablet, Take 1 tablet (25 mg total) by mouth daily, Disp: 90 tablet, Rfl: 3    nitroglycerin (NITROSTAT) 0.4 mg SL tablet, Place 1 tablet (0.4 mg total) under the tongue every 5 (five) minutes as needed for chest pain, Disp: 25 tablet, Rfl: 0    rosuvastatin (CRESTOR) 20 MG tablet, Take 1 tablet (20 mg total) by mouth daily, Disp: 90 tablet, Rfl: 1    amLODIPine (NORVASC) 5 mg tablet, TAKE 1 TABLET (5 MG TOTAL) BY MOUTH DAILY., Disp: 90 tablet, Rfl: 1    Aspirin Low Dose 81 MG EC tablet, TOME 1 TABLETA POR VIA ORAL TODOS LOS LUJAN, Disp: 90 tablet, Rfl: 1    pantoprazole (PROTONIX) 20 mg tablet, Take 1 tablet (20 mg total) by mouth 2 (two) times a day, Disp: 28 tablet, Rfl: 0    terbinafine (LamISIL) 250 mg tablet, Take 1 tablet (250 mg total) by mouth daily, Disp: 30 tablet, Rfl: 0  No Known Allergies    Labs:     Chemistry        Component Value Date/Time    K 4.6 04/19/2025 0704     04/19/2025 0704    CO2 27 04/19/2025 0704    BUN 20 04/19/2025 0704    CREATININE 0.73 04/19/2025 0704        Component Value Date/Time    CALCIUM 9.2 04/19/2025 0704    ALKPHOS 74 04/19/2025 0704    AST 15 04/19/2025 0704    ALT 18 04/19/2025 0704            No results found for: \"CHOL\"  Lab Results   Component Value Date    HDL 40 04/19/2025    HDL 41 09/15/2024    HDL 27 (L) 05/04/2024     Lab Results " "  Component Value Date    LDLCALC 62 04/19/2025    LDLCALC 54 09/15/2024    LDLCALC 90 05/04/2024     Lab Results   Component Value Date    TRIG 101 04/19/2025    TRIG 92 09/15/2024    TRIG 94 05/04/2024     No results found for: \"CHOLHDL\"    Imaging: Cardiac catheterization    Result Date: 6/13/2024  Narrative:   S/P successful PCI with DAVIAN x 1 to the Mid Cx lesion 65% stenosis   Prox RCA lesion is 45% stenosed and amenable to medical management   LVEDP is normal without gradient on LV-AO pullback     Stress strip    Result Date: 5/31/2024  Narrative: Confirmed by LUH STEVENS ([id number]),  Funmi Paula ([id number]) on 5/31/2024 1:23:30 PM    Echo stress test, exercise    Result Date: 5/31/2024  Narrative:   Stress ECG: Overall, the patient's exercise capacity was moderately impaired for their age. The patient after exercising for 4 min and 5 sec and had a maximal HR of 118 bpm (80% of MPHR) and 5.8 METS. The patient experienced exercise-limiting angina during the test.   Stress ECG: Horizontal ST depression of 2.5-3.0 mm in leads II, III and aVF noted.  In addition 1.5 mm ST depressions noted in leads V3-V6.  ST depressions were noted at low workload and persisted about 3 minutes into recovery. The stress ECG is consistent with ischemia after maximal exercise, with reproduction of symptoms.   Peak Stress Echo: Left ventricle cavity has normal reduction in size at peak stress. The peak stress echo showed new anterolateral wall motion abnormalities compared to baseline.   Echo Post Impression: The study is consistent with ischemia, after maximal exercise. Markedly abnormal exercise treadmill stress echo as noted above. Clinically abnormal for limiting angina. Electrocardiographic and echocardiographic evidence of ischemia at low workload. Further ischemic evaluation should be considered with cardiac catheterization if clinically indicated. ER precautions reviewed with the patient.     Echo complete w/ contrast if " "indicated    Result Date: 5/31/2024  Narrative:   Left Ventricle: Left ventricular cavity size is normal. Wall thickness is mildly increased. The left ventricular ejection fraction is 50% by single dimension measurement. Systolic function is low normal. Diastolic function is mildly abnormal, consistent with grade I (abnormal) relaxation.  Left atrial filling pressure is elevated.   Right Ventricle: Right ventricular cavity size is normal. Systolic function is normal.   Left Atrium: The atrium is mildly dilated (35-41 mL/m2).   Right Atrium: The atrium is mildly dilated.   Aortic Valve: The aortic valve is trileaflet.  There is moderate area of calcification noted.  This appears to be subvalvular in location.   The leaflets exhibit normal mobility. There is mild regurgitation. The aortic valve has no significant stenosis.   Mitral Valve: There is mild annular calcification. There is mild regurgitation.   Pulmonic Valve: There is mild regurgitation.   Prior TTE study available for comparison. Prior study date: 5/5/2023. No significant changes noted compared to the prior study.       ECG:  n/a    Review of Systems   Constitutional: Negative.   HENT: Negative.     Eyes: Negative.    Cardiovascular:  Positive for chest pain (discomfort).   Respiratory: Negative.     Endocrine: Negative.    Skin: Negative.    Gastrointestinal: Negative.    Genitourinary: Negative.    Psychiatric/Behavioral: Negative.     Allergic/Immunologic: Negative.    All other systems reviewed and are negative.      Vitals:    04/10/25 1455   BP: 120/80   Pulse: 71   Temp: 97.5 °F (36.4 °C)   SpO2: 98%     Vitals:    04/10/25 1455   Weight: 90.6 kg (199 lb 12.8 oz)     Height: 5' 9\" (175.3 cm)   Body mass index is 29.51 kg/m².    Physical Exam  Vitals and nursing note reviewed.   Constitutional:       General: He is not in acute distress.     Appearance: He is not ill-appearing.   HENT:      Head: Normocephalic.      Nose: Nose normal.      " Mouth/Throat:      Mouth: Mucous membranes are moist.      Pharynx: Oropharynx is clear.     Cardiovascular:      Rate and Rhythm: Normal rate and regular rhythm.      Heart sounds: No murmur heard.  Pulmonary:      Effort: Pulmonary effort is normal.      Breath sounds: Normal breath sounds.     Musculoskeletal:         General: Normal range of motion.      Cervical back: Normal range of motion.      Right lower leg: No edema.      Left lower leg: No edema.     Skin:     General: Skin is warm and dry.     Neurological:      Mental Status: He is alert and oriented to person, place, and time.     Psychiatric:         Mood and Affect: Mood normal.         Behavior: Behavior normal.

## 2025-04-19 ENCOUNTER — APPOINTMENT (OUTPATIENT)
Dept: LAB | Facility: HOSPITAL | Age: 65
End: 2025-04-19
Payer: COMMERCIAL

## 2025-04-19 DIAGNOSIS — R06.09 DOE (DYSPNEA ON EXERTION): ICD-10-CM

## 2025-04-19 DIAGNOSIS — E78.5 DYSLIPIDEMIA: ICD-10-CM

## 2025-04-19 LAB
25(OH)D3 SERPL-MCNC: 11.7 NG/ML (ref 30–100)
ALBUMIN SERPL BCG-MCNC: 4.5 G/DL (ref 3.5–5)
ALP SERPL-CCNC: 74 U/L (ref 34–104)
ALT SERPL W P-5'-P-CCNC: 18 U/L (ref 7–52)
ANION GAP SERPL CALCULATED.3IONS-SCNC: 8 MMOL/L (ref 4–13)
AST SERPL W P-5'-P-CCNC: 15 U/L (ref 13–39)
BASOPHILS # BLD AUTO: 0.04 THOUSANDS/ÂΜL (ref 0–0.1)
BASOPHILS NFR BLD AUTO: 1 % (ref 0–1)
BILIRUB SERPL-MCNC: 0.47 MG/DL (ref 0.2–1)
BNP SERPL-MCNC: 13 PG/ML (ref 0–100)
BUN SERPL-MCNC: 20 MG/DL (ref 5–25)
CALCIUM SERPL-MCNC: 9.2 MG/DL (ref 8.4–10.2)
CHLORIDE SERPL-SCNC: 104 MMOL/L (ref 96–108)
CHOLEST SERPL-MCNC: 122 MG/DL (ref ?–200)
CO2 SERPL-SCNC: 27 MMOL/L (ref 21–32)
CREAT SERPL-MCNC: 0.73 MG/DL (ref 0.6–1.3)
EOSINOPHIL # BLD AUTO: 0.12 THOUSAND/ÂΜL (ref 0–0.61)
EOSINOPHIL NFR BLD AUTO: 2 % (ref 0–6)
ERYTHROCYTE [DISTWIDTH] IN BLOOD BY AUTOMATED COUNT: 13.9 % (ref 11.6–15.1)
EST. AVERAGE GLUCOSE BLD GHB EST-MCNC: 134 MG/DL
GFR SERPL CREATININE-BSD FRML MDRD: 98 ML/MIN/1.73SQ M
GLUCOSE P FAST SERPL-MCNC: 112 MG/DL (ref 65–99)
HBA1C MFR BLD: 6.3 %
HCT VFR BLD AUTO: 42.8 % (ref 36.5–49.3)
HDLC SERPL-MCNC: 40 MG/DL
HGB BLD-MCNC: 14 G/DL (ref 12–17)
IMM GRANULOCYTES # BLD AUTO: 0.01 THOUSAND/UL (ref 0–0.2)
IMM GRANULOCYTES NFR BLD AUTO: 0 % (ref 0–2)
LDLC SERPL CALC-MCNC: 62 MG/DL (ref 0–100)
LYMPHOCYTES # BLD AUTO: 1.63 THOUSANDS/ÂΜL (ref 0.6–4.47)
LYMPHOCYTES NFR BLD AUTO: 27 % (ref 14–44)
MCH RBC QN AUTO: 27.6 PG (ref 26.8–34.3)
MCHC RBC AUTO-ENTMCNC: 32.7 G/DL (ref 31.4–37.4)
MCV RBC AUTO: 84 FL (ref 82–98)
MONOCYTES # BLD AUTO: 0.53 THOUSAND/ÂΜL (ref 0.17–1.22)
MONOCYTES NFR BLD AUTO: 9 % (ref 4–12)
NEUTROPHILS # BLD AUTO: 3.81 THOUSANDS/ÂΜL (ref 1.85–7.62)
NEUTS SEG NFR BLD AUTO: 61 % (ref 43–75)
NRBC BLD AUTO-RTO: 0 /100 WBCS
PLATELET # BLD AUTO: 214 THOUSANDS/UL (ref 149–390)
PMV BLD AUTO: 11.6 FL (ref 8.9–12.7)
POTASSIUM SERPL-SCNC: 4.6 MMOL/L (ref 3.5–5.3)
PROT SERPL-MCNC: 7.8 G/DL (ref 6.4–8.4)
RBC # BLD AUTO: 5.08 MILLION/UL (ref 3.88–5.62)
SODIUM SERPL-SCNC: 139 MMOL/L (ref 135–147)
TRIGL SERPL-MCNC: 101 MG/DL (ref ?–150)
TSH SERPL DL<=0.05 MIU/L-ACNC: 2.1 UIU/ML (ref 0.45–4.5)
WBC # BLD AUTO: 6.14 THOUSAND/UL (ref 4.31–10.16)

## 2025-04-19 PROCEDURE — 83880 ASSAY OF NATRIURETIC PEPTIDE: CPT

## 2025-04-19 PROCEDURE — 80061 LIPID PANEL: CPT

## 2025-04-21 ENCOUNTER — RESULTS FOLLOW-UP (OUTPATIENT)
Dept: CARDIOLOGY CLINIC | Facility: CLINIC | Age: 65
End: 2025-04-21

## 2025-04-21 LAB — H PYLORI AG STL QL IA: POSITIVE

## 2025-04-22 ENCOUNTER — HOSPITAL ENCOUNTER (OUTPATIENT)
Facility: HOSPITAL | Age: 65
Discharge: HOME | End: 2025-04-22
Payer: COMMERCIAL

## 2025-04-22 ENCOUNTER — RESULTS FOLLOW-UP (OUTPATIENT)
Dept: FAMILY MEDICINE CLINIC | Facility: CLINIC | Age: 65
End: 2025-04-22

## 2025-04-22 ENCOUNTER — HOSPITAL ENCOUNTER (OUTPATIENT)
Dept: NON INVASIVE DIAGNOSTICS | Facility: HOSPITAL | Age: 65
Discharge: HOME | End: 2025-04-22
Payer: COMMERCIAL

## 2025-04-22 VITALS — BODY MASS INDEX: 29.47 KG/M2 | HEIGHT: 69 IN | WEIGHT: 199 LBS

## 2025-04-22 DIAGNOSIS — R07.89 CHEST DISCOMFORT: ICD-10-CM

## 2025-04-22 DIAGNOSIS — I25.118 CORONARY ARTERY DISEASE OF NATIVE ARTERY OF NATIVE HEART WITH STABLE ANGINA PECTORIS (HCC): ICD-10-CM

## 2025-04-22 DIAGNOSIS — I10 PRIMARY HYPERTENSION: ICD-10-CM

## 2025-04-22 DIAGNOSIS — A04.8 HELICOBACTER PYLORI STOOL TEST POSITIVE: Primary | ICD-10-CM

## 2025-04-22 LAB
MAX DIASTOLIC BP: 80 MMHG
MAX HR PERCENT: 88 %
MAX HR: 139 BPM
MAX PREDICTED HEART RATE: 157 BPM
PROTOCOL NAME: NORMAL
RATE PRESSURE PRODUCT: NORMAL
REASON FOR TERMINATION: NORMAL
SL CV REST NUCLEAR ISOTOPE DOSE: 10.2 MCI
SL CV STRESS NUCLEAR ISOTOPE DOSE: 33 MCI
SL CV STRESS RECOVERY BP: NORMAL MMHG
SL CV STRESS RECOVERY HR: 80 BPM
SL CV STRESS RECOVERY O2 SAT: 98 %
SL CV STRESS STAGE REACHED: 3
SPECT HRT GATED+EF W RNC IV: 60 %
STRESS ANGINA INDEX: 1
STRESS BASELINE BP: NORMAL MMHG
STRESS BASELINE HR: 62 BPM
STRESS O2 SAT REST: 98 %
STRESS PEAK HR: 139 BPM
STRESS POST ESTIMATED WORKLOAD: 8.5 METS
STRESS POST EXERCISE DUR MIN: 7 MIN
STRESS POST EXERCISE DUR MIN: 7 MIN
STRESS POST EXERCISE DUR SEC: 2 SEC
STRESS POST EXERCISE DUR SEC: 2 SEC
STRESS POST O2 SAT PEAK: 99 %
STRESS POST PEAK BP: 212 MMHG
STRESS POST PEAK HR: 139 BPM
STRESS POST PEAK SYSTOLIC BP: 212 MMHG
STRESS/REST PERFUSION RATIO: 1.05
TARGET HR FORMULA: NORMAL
TEST INDICATION: NORMAL

## 2025-04-22 PROCEDURE — 78452 HT MUSCLE IMAGE SPECT MULT: CPT | Performed by: INTERNAL MEDICINE

## 2025-04-22 PROCEDURE — 93016 CV STRESS TEST SUPVJ ONLY: CPT | Performed by: INTERNAL MEDICINE

## 2025-04-22 PROCEDURE — 93018 CV STRESS TEST I&R ONLY: CPT | Performed by: INTERNAL MEDICINE

## 2025-04-22 PROCEDURE — 78452 HT MUSCLE IMAGE SPECT MULT: CPT

## 2025-04-22 PROCEDURE — 93017 CV STRESS TEST TRACING ONLY: CPT

## 2025-04-22 PROCEDURE — A9502 TC99M TETROFOSMIN: HCPCS

## 2025-05-01 ENCOUNTER — CONSULT (OUTPATIENT)
Dept: PULMONOLOGY | Facility: CLINIC | Age: 65
End: 2025-05-01
Payer: COMMERCIAL

## 2025-05-01 VITALS
WEIGHT: 199 LBS | BODY MASS INDEX: 29.47 KG/M2 | DIASTOLIC BLOOD PRESSURE: 64 MMHG | SYSTOLIC BLOOD PRESSURE: 118 MMHG | TEMPERATURE: 98.2 F | HEART RATE: 68 BPM | HEIGHT: 69 IN | OXYGEN SATURATION: 98 %

## 2025-05-01 DIAGNOSIS — I10 PRIMARY HYPERTENSION: ICD-10-CM

## 2025-05-01 DIAGNOSIS — G47.33 OSA (OBSTRUCTIVE SLEEP APNEA): Primary | ICD-10-CM

## 2025-05-01 DIAGNOSIS — G47.19 EXCESSIVE DAYTIME SLEEPINESS: ICD-10-CM

## 2025-05-01 PROCEDURE — 99204 OFFICE O/P NEW MOD 45 MIN: CPT | Performed by: INTERNAL MEDICINE

## 2025-05-01 NOTE — ASSESSMENT & PLAN NOTE
He has previous history of hypertension and coronary artery disease.  Currently his blood pressure is controlled with amlodipine and metoprolol

## 2025-05-01 NOTE — PROGRESS NOTES
Name: Ramon Mora      : 1960      MRN: [id number]  Encounter Provider: Amanda Edwards MD  Encounter Date: 2025   Encounter department: [facility]  :  Assessment & Plan  JOSE (obstructive sleep apnea)  Mr. Ramon Mora has had a long history of loud snoring, witnessed apneas and choking.  He also feels sleepy and tired during the day.  His [place] sleepiness score is 12 out of 24.  He denied any morning headache.  He felt not refreshed in the mornings.  On clinical examination he had oropharyngeal crowding.  He previously had tonsillectomy.  He most likely has significant obstructive sleep apnea and could benefit from CPAP therapy.  I have ordered overnight home sleep study for further evaluation.  I have advised him regarding driving and use of sedating medications and  alcohol.  I had a long discussion with him and his wife and answered all their questions.  Orders:    Home Sleep Study; Future    Excessive daytime sleepiness  He has excessive daytime sleepiness and tiredness and this is most likely related to sleep apnea.  His [place] score was 12 out of 24.  I have advised him regarding precautions to take while driving.  Orders:    Home Sleep Study; Future    Primary hypertension  He has previous history of hypertension and coronary artery disease.  Currently his blood pressure is controlled with amlodipine and metoprolol           History of Present Illness   HPI  Ramon Mora is a 64 y.o. male with past medical history of coronary artery disease status post coronary stent and hypertension who has been referred for evaluation for sleep breathing disorder.  He has been having a long history of loud snoring witnessed apneas and choking episodes at night.  He also feels sleepy and tired during the day.  He occasionally feels sleepy while driving.  He is a poor sleeper score is 12 out of 24.  He does not consume alcohol or smoke.  He had no  "history of diabetes.  No previous history of stroke.  Not been sleep tested before.  He denied any previous COPD or asthma.  Denied any shortness of breath cough or phlegm or wheeze or chest pain.  No swelling of feet.  No fever or chills.  Been on treatment with amlodipine and metoprolol for hypertension.      Review of Systems       Objective   /64 (BP Location: Left arm, Patient Position: Sitting, Cuff Size: Standard)   Pulse 68   Temp 98.2 °F (36.8 °C) (Tympanic)   Ht 5' 9\" (1.753 m)   Wt 90.3 kg (199 lb)   SpO2 98%   BMI 29.39 kg/m²      Physical Exam      "

## 2025-05-12 NOTE — PROGRESS NOTES
Name: Ramon Mora      : 1960      MRN: [id number]  Encounter Provider: Rosamaria Montesinos PA-C  Encounter Date: 2025   Encounter department: [facility]  :  Assessment & Plan  Helicobacter pylori stool test positive  Patient with positive H. pylori stool test on 2025.  Will initiate treatment as below.  Plan for repeat H. Pylori stool test in about 2 months.  Advised patient that he cannot have PPI within 2 weeks of repeat stool test.      Orders:    Ambulatory Referral to Gastroenterology    bismuth subsalicylate (PEPTO BISMOL) 262 MG chewable tablet; Chew 1 tablet (262 mg total) 4 (four) times a day (before meals and at bedtime) for 14 days    tetracycline (ACHROMYCIN,SUMYCIN) 500 MG capsule; Take 1 capsule (500 mg total) by mouth every 6 (six) hours for 14 days    metroNIDAZOLE (FLAGYL) 250 mg tablet; Take 1 tablet (250 mg total) by mouth every 6 (six) hours for 14 days    pantoprazole (PROTONIX) 20 mg tablet; Take 1 tablet (20 mg total) by mouth 2 (two) times a day    H. pylori antigen, stool; Future    Screening for colon cancer  No known family history of colon cancer.  Patient has never had a colonoscopy but he did have a negative Cologuard test in .           History of Present Illness   Ramon Mora is a 64 y.o. male who presents to the office for evaluation due to positive H. Pylori stool test on 25.  He reports that he was tested because his wife tested positive for H. pylori.  He reports he has never had H. pylori in the past.  Denies any recent antibiotic use.  Denies nausea, vomiting, heartburn, acid reflux, abdominal pain.  He has no issues with his bowels and reports that he moves his bowels every day.  Denies blood in his stool.  No family history of colon cancer.  No unintentional weight loss.  He has never had an EGD or colonoscopy done.  He did have a negative colon card test in .  No NSAID use.  Denies  "tobacco, recreational drug, alcohol use.    HPI  History obtained from: patient and patient's Significant Other  Review of Systems A complete review of systems is negative other than that noted above in the HPI.         Objective   /73 (BP Location: Left arm, Patient Position: Sitting, Cuff Size: Standard)   Pulse 67   Ht 5' 9\" (1.753 m)   Wt 90.7 kg (200 lb)   BMI 29.53 kg/m²     Physical Exam  Vitals reviewed.   Constitutional:       General: He is not in acute distress.     Appearance: Normal appearance.   HENT:      Head: Normocephalic and atraumatic.   Eyes:      Conjunctiva/sclera: Conjunctivae normal.   Cardiovascular:      Rate and Rhythm: Normal rate.   Pulmonary:      Effort: Pulmonary effort is normal. No respiratory distress.   Abdominal:      General: Bowel sounds are normal.      Palpations: Abdomen is soft.      Tenderness: There is no abdominal tenderness. There is no guarding or rebound.   Skin:     General: Skin is warm and dry.   Neurological:      General: No focal deficit present.      Mental Status: He is alert.   Psychiatric:         Mood and Affect: Mood normal.         Behavior: Behavior normal.          Lab Results: I personally reviewed relevant lab results.             "

## 2025-05-13 ENCOUNTER — CONSULT (OUTPATIENT)
Age: 65
End: 2025-05-13
Attending: FAMILY MEDICINE

## 2025-05-13 VITALS
DIASTOLIC BLOOD PRESSURE: 73 MMHG | HEART RATE: 67 BPM | HEIGHT: 69 IN | SYSTOLIC BLOOD PRESSURE: 127 MMHG | WEIGHT: 200 LBS | BODY MASS INDEX: 29.62 KG/M2

## 2025-05-13 DIAGNOSIS — Z12.11 SCREENING FOR COLON CANCER: ICD-10-CM

## 2025-05-13 DIAGNOSIS — A04.8 HELICOBACTER PYLORI STOOL TEST POSITIVE: Primary | ICD-10-CM

## 2025-05-13 RX ORDER — PANTOPRAZOLE SODIUM 20 MG/1
20 TABLET, DELAYED RELEASE ORAL 2 TIMES DAILY
Qty: 28 TABLET | Refills: 0 | Status: SHIPPED | OUTPATIENT
Start: 2025-05-13

## 2025-05-13 RX ORDER — TETRACYCLINE HYDROCHLORIDE 500 MG/1
500 CAPSULE ORAL EVERY 6 HOURS
Qty: 56 CAPSULE | Refills: 0 | Status: SHIPPED | OUTPATIENT
Start: 2025-05-13 | End: 2025-05-27

## 2025-05-13 RX ORDER — METRONIDAZOLE 250 MG/1
250 TABLET ORAL EVERY 6 HOURS
Qty: 56 TABLET | Refills: 0 | Status: SHIPPED | OUTPATIENT
Start: 2025-05-13 | End: 2025-05-27

## 2025-05-13 RX ORDER — BISMUTH SUBSALICYLATE 262 MG/1
262 TABLET, CHEWABLE ORAL
Qty: 56 TABLET | Refills: 0 | Status: SHIPPED | OUTPATIENT
Start: 2025-05-13 | End: 2025-05-27

## 2025-05-13 NOTE — ASSESSMENT & PLAN NOTE
Patient with positive H. pylori stool test on 4/19/2025.  Will initiate treatment as below.  Plan for repeat H. Pylori stool test in about 2 months.  Advised patient that he cannot have PPI within 2 weeks of repeat stool test.      Orders:    Ambulatory Referral to Gastroenterology    bismuth subsalicylate (PEPTO BISMOL) 262 MG chewable tablet; Chew 1 tablet (262 mg total) 4 (four) times a day (before meals and at bedtime) for 14 days    tetracycline (ACHROMYCIN,SUMYCIN) 500 MG capsule; Take 1 capsule (500 mg total) by mouth every 6 (six) hours for 14 days    metroNIDAZOLE (FLAGYL) 250 mg tablet; Take 1 tablet (250 mg total) by mouth every 6 (six) hours for 14 days    pantoprazole (PROTONIX) 20 mg tablet; Take 1 tablet (20 mg total) by mouth 2 (two) times a day    H. pylori antigen, stool; Future

## 2025-05-20 ENCOUNTER — OFFICE VISIT (OUTPATIENT)
Dept: FAMILY MEDICINE CLINIC | Facility: CLINIC | Age: 65
End: 2025-05-20
Payer: COMMERCIAL

## 2025-05-20 VITALS
HEART RATE: 70 BPM | BODY MASS INDEX: 29.77 KG/M2 | WEIGHT: 201 LBS | OXYGEN SATURATION: 97 % | HEIGHT: 69 IN | DIASTOLIC BLOOD PRESSURE: 76 MMHG | SYSTOLIC BLOOD PRESSURE: 130 MMHG

## 2025-05-20 DIAGNOSIS — E55.9 VITAMIN D DEFICIENCY: ICD-10-CM

## 2025-05-20 DIAGNOSIS — K21.9 GASTROESOPHAGEAL REFLUX DISEASE WITHOUT ESOPHAGITIS: ICD-10-CM

## 2025-05-20 DIAGNOSIS — R73.03 PREDIABETES: ICD-10-CM

## 2025-05-20 DIAGNOSIS — Z12.5 SCREENING FOR PROSTATE CANCER: ICD-10-CM

## 2025-05-20 DIAGNOSIS — Z95.820 S/P ANGIOPLASTY WITH STENT: Primary | ICD-10-CM

## 2025-05-20 DIAGNOSIS — A04.8 HELICOBACTER PYLORI STOOL TEST POSITIVE: ICD-10-CM

## 2025-05-20 DIAGNOSIS — I10 PRIMARY HYPERTENSION: ICD-10-CM

## 2025-05-20 DIAGNOSIS — E78.2 MIXED HYPERLIPIDEMIA: ICD-10-CM

## 2025-05-20 DIAGNOSIS — E61.1 IRON DEFICIENCY: ICD-10-CM

## 2025-05-20 DIAGNOSIS — Z79.899 OTHER LONG TERM (CURRENT) DRUG THERAPY: ICD-10-CM

## 2025-05-20 PROCEDURE — 99214 OFFICE O/P EST MOD 30 MIN: CPT | Performed by: FAMILY MEDICINE

## 2025-05-20 NOTE — PROGRESS NOTES
Name: Ramon Mora      : 1960      MRN: [id number]  Encounter Provider: Herbert Garcia MD  Encounter Date: 2025   Encounter department: [facility]    :  Assessment & Plan  S/P angioplasty with stent         Helicobacter pylori stool test positive         Vitamin D deficiency    Orders:    Vitamin D 25 hydroxy; Future    Prediabetes         Gastroesophageal reflux disease without esophagitis         Mixed hyperlipidemia         Primary hypertension         Other long term (current) drug therapy    Orders:    Hemoglobin A1C; Future    Screening for prostate cancer    Orders:    PSA, Total Screen; Future    Iron deficiency    Orders:    Iron Panel (Includes Ferritin, Iron Sat%, Iron, and TIBC); Future      Assessment & Plan  1. Helicobacter pylori infection.  - Experiencing fatigue attributed to the current medication regimen.  - Reports a persistent bad taste in the mouth.  - Currently on metronidazole, tetracycline, and pantoprazole.  - Advised to continue the current medication regimen with food; a stool test will be conducted in approximately 2 months to assess treatment effectiveness.    2. Sleep apnea.  - Scheduled appointment with a pulmonologist in 2025.  - An overnight study has been ordered for further evaluation.    3. Hypertension.  - Blood pressure readings are within the normal range today at 130/76.  - Currently on metoprolol, isosorbide, and amlodipine.  - Advised to continue the current medication regimen.    4. Vitamin D deficiency.  - Vitamin D levels are significantly low.  - Advised to start taking vitamin D supplements after completing H. pylori treatment.  - Recommended dosage is twice daily for 2 months, followed by a reduction to once daily thereafter.    5. Hyperglycemia.  - Blood glucose levels have shown a slight increase, with an A1c of 6.3.  - Advised to monitor intake of starches and carbohydrates closely.  - Dietary recommendations include  increasing consumption of beans, meats, and fish, while reducing intake of rice, potatoes, and pumpkin.  - Follow-up A1c test will be conducted in 08/2025 to monitor blood glucose levels.           History of Present Illness     History of Present Illness  The patient presents for evaluation of H. pylori infection, sleep apnea, hypertension, vitamin D deficiency, and hyperglycemia.    He reports experiencing fatigue, which he attributes to his current medication regimen for H. pylori. He is on a schedule of taking the medication every 6 hours but does not wake up at 2:00 AM to take it. He also reports a persistent bad taste in his mouth. His current medications for H. pylori include metronidazole, tetracycline, and pantoprazole.    He mentions having a significant sleep apnea diagnosis and has an upcoming overnight study scheduled for 06/2025. He previously had his tonsils removed but still experiences symptoms related to sleep apnea.    He is not currently taking any vitamin D supplements despite being advised to do so.    He is on metoprolol, isosorbide, and amlodipine for his blood pressure and heart condition. He inquires about the results of a recent stress test, which showed a normal blood pressure and heart rate response but indicated some ischemia.        PAST SURGICAL HISTORY:  - Tonsillectomy     Review of Systems   Constitutional:  Negative for activity change, appetite change and fever.   HENT:  Negative for congestion, nosebleeds and trouble swallowing.    Eyes:  Negative for itching.   Respiratory:  Negative for cough and chest tightness.    Cardiovascular:  Negative for chest pain and palpitations.   Gastrointestinal:  Negative for abdominal pain, constipation, diarrhea and nausea.   Endocrine: Negative for cold intolerance.   Genitourinary:  Negative for frequency.   Musculoskeletal:  Negative for gait problem and joint swelling.   Skin:  Negative for rash.   Allergic/Immunologic: Negative for  "immunocompromised state.   Neurological:  Negative for dizziness, tremors, seizures, syncope and headaches.   Psychiatric/Behavioral:  Negative for hallucinations and suicidal ideas.      Objective   /76   Pulse 70   Ht 5' 9\" (1.753 m)   Wt 91.2 kg (201 lb)   SpO2 97%   BMI 29.68 kg/m²     Physical Exam  - Respiratory: Clear to auscultation, no wheezing, rales or rhonchi  Physical Exam  Vitals and nursing note reviewed.   Constitutional:       Appearance: He is well-developed.   HENT:      Head: Normocephalic and atraumatic.     Eyes:      Conjunctiva/sclera: Conjunctivae normal.      Pupils: Pupils are equal, round, and reactive to light.       Cardiovascular:      Rate and Rhythm: Normal rate and regular rhythm.      Heart sounds: Murmur heard.   Pulmonary:      Effort: Pulmonary effort is normal.      Breath sounds: Normal breath sounds. No wheezing or rales.   Abdominal:      General: Bowel sounds are normal. There is no distension.      Palpations: Abdomen is soft.      Tenderness: There is no abdominal tenderness.     Musculoskeletal:         General: No tenderness. Normal range of motion.      Cervical back: Normal range of motion and neck supple.     Skin:     General: Skin is warm and dry.      Findings: No rash.     Neurological:      Mental Status: He is alert and oriented to person, place, and time.      Cranial Nerves: No cranial nerve deficit.      Sensory: No sensory deficit.      Coordination: Coordination normal.     Psychiatric:         Behavior: Behavior normal.         Thought Content: Thought content normal.         Judgment: Judgment normal.         "

## 2025-06-01 DIAGNOSIS — B35.1 ONYCHOMYCOSIS: ICD-10-CM

## 2025-06-01 DIAGNOSIS — B35.3 TINEA PEDIS OF BOTH FEET: ICD-10-CM

## 2025-06-02 RX ORDER — TERBINAFINE HYDROCHLORIDE 250 MG/1
250 TABLET ORAL DAILY
Qty: 30 TABLET | Refills: 0 | Status: SHIPPED | OUTPATIENT
Start: 2025-06-02 | End: 2025-07-02

## 2025-06-04 ENCOUNTER — HOSPITAL ENCOUNTER (OUTPATIENT)
Dept: SLEEP CENTER | Facility: CLINIC | Age: 65
Discharge: HOME | End: 2025-06-04
Attending: INTERNAL MEDICINE
Payer: COMMERCIAL

## 2025-06-04 DIAGNOSIS — G47.33 OSA (OBSTRUCTIVE SLEEP APNEA): ICD-10-CM

## 2025-06-04 DIAGNOSIS — G47.19 EXCESSIVE DAYTIME SLEEPINESS: ICD-10-CM

## 2025-06-04 PROCEDURE — G0399 HOME SLEEP TEST/TYPE 3 PORTA: HCPCS

## 2025-06-04 PROCEDURE — 95806 SLEEP STUDY UNATT&RESP EFFT: CPT | Performed by: INTERNAL MEDICINE

## 2025-06-04 NOTE — PROGRESS NOTES
Home Sleep Study Documentation    HOME STUDY DEVICE: Noxturnal no                                           Barbie G3 yes      Pre-Sleep Home Study:    Set-up and instructions performed by: SYLVIA Moctezuma     Technician performed demonstration for Patient: yes    Return demonstration performed by Patient: yes    Written instructions provided to Patient: yes    Patient signed consent form: yes          Post-Sleep Home Study:    Post Test Questionnaire Data: On the post-study questionnaire, the patient estimated 6 hours of sleep during this test, with 1 awakenings. Please refer to scanned form for additional comments on alcohol use on day of test, medications, and/or activities during awakenings    Additional comments by Patient: None    Home Sleep Study Failed:no:    Failure reason: N/A    Reported or Detected: N/A    Scored by: SYLVIA Guzman

## 2025-06-06 PROBLEM — G47.33 OSA (OBSTRUCTIVE SLEEP APNEA): Status: ACTIVE | Noted: 2025-06-06

## 2025-06-08 PROBLEM — G47.19 EXCESSIVE DAYTIME SLEEPINESS: Status: ACTIVE | Noted: 2025-06-08

## 2025-06-12 DIAGNOSIS — Z95.820 S/P ANGIOPLASTY WITH STENT: ICD-10-CM

## 2025-06-12 RX ORDER — AMLODIPINE BESYLATE 5 MG/1
5 TABLET ORAL DAILY
Qty: 90 TABLET | Refills: 1 | Status: SHIPPED | OUTPATIENT
Start: 2025-06-12

## 2025-06-14 DIAGNOSIS — R07.9 CHEST PAIN, UNSPECIFIED TYPE: ICD-10-CM

## 2025-06-14 DIAGNOSIS — R94.39 ABNORMAL STRESS TEST: ICD-10-CM

## 2025-06-15 RX ORDER — ASPIRIN 81 MG/1
TABLET, COATED ORAL
Qty: 90 TABLET | Refills: 1 | Status: SHIPPED | OUTPATIENT
Start: 2025-06-15

## 2025-06-16 ENCOUNTER — TELEPHONE (OUTPATIENT)
Dept: SLEEP CENTER | Facility: CLINIC | Age: 65
End: 2025-06-16

## 2025-06-16 NOTE — TELEPHONE ENCOUNTER
Patient of Dr. Edwards, [facility].     Home sleep study resulted and shows mild sleep apnea.   RECOMMENDATIONS: per Dr. Wong   1.  The patient has mild obstructive sleep apnea and is symptomatic from the sleep apnea.  This  could be an underestimation, given the limitations of home sleep study. The CPAP therapy, oral appliance and surgery in selected cases are the various options for people with mild obstructive sleep apnea.  Of these, the CPAP therapy is the most effective.  Weight reducing medications are also very effective for treatment of obstructive sleep apnea.  If he wants to go ahead with CPAP, it is recommended that he be given a trial with auto CPAP 5 to 15 cm of water using a mask/interface of patient choice.  2.  He should be advised regarding driving if he operates any automobile.  3.  He should avoid sedating medications and alcohol which could worsen the sleep apnea.    Call to patient reviewed results and recommendations. Patient leaving tomorrow for [place] as his father recently passed. States he will see Dr. Edwards in August and will discuss results then. [place] FFT message sent as well in [facility].     Routed to Pulmonary Clinical team of [facility].

## 2025-07-20 DIAGNOSIS — R94.39 ABNORMAL STRESS TEST: ICD-10-CM

## 2025-07-20 DIAGNOSIS — R07.9 CHEST PAIN, UNSPECIFIED TYPE: ICD-10-CM

## 2025-07-22 RX ORDER — METOPROLOL SUCCINATE 25 MG/1
25 TABLET, EXTENDED RELEASE ORAL DAILY
Qty: 90 TABLET | Refills: 1 | Status: SHIPPED | OUTPATIENT
Start: 2025-07-22

## 2025-07-22 NOTE — TELEPHONE ENCOUNTER
Patient called to request a refill for their metoprolol advised a refill was requested on 7/20/25 and is pending approval. Patient verbalized understanding and is in agreement.     Does the patient have enough for 3 days?   [] Yes   [x] No - Send as HP to POD

## 2025-08-09 ENCOUNTER — APPOINTMENT (OUTPATIENT)
Dept: LAB | Facility: HOSPITAL | Age: 65
End: 2025-08-09
Attending: FAMILY MEDICINE
Payer: COMMERCIAL

## 2025-08-22 LAB

## (undated) DEVICE — Device: Brand: ASAHI SILVERWAY

## (undated) DEVICE — PRESSURE GUIDEWIRE: Brand: COMET™ II

## (undated) DEVICE — TR BAND RADIAL ARTERY COMPRESSION DEVICE: Brand: TR BAND

## (undated) DEVICE — RADIFOCUS OPTITORQUE ANGIOGRAPHIC CATHETER: Brand: OPTITORQUE

## (undated) DEVICE — CATH GUIDE LAUNCHER 5FR EBU 3.5

## (undated) DEVICE — GUIDEWIRE WHOLEY HI TORQUE INTERM MOD J .035 145CM

## (undated) DEVICE — BALLOON NC EUPHORA 3 X 15MM

## (undated) DEVICE — GLIDESHEATH BASIC HYDROPHILIC COATED INTRODUCER SHEATH: Brand: GLIDESHEATH

## (undated) DEVICE — BALLOON EUPHORA RX 2.5 X 15MM